# Patient Record
Sex: MALE | Race: WHITE | NOT HISPANIC OR LATINO | Employment: OTHER | URBAN - METROPOLITAN AREA
[De-identification: names, ages, dates, MRNs, and addresses within clinical notes are randomized per-mention and may not be internally consistent; named-entity substitution may affect disease eponyms.]

---

## 2017-02-14 ENCOUNTER — TRANSCRIBE ORDERS (OUTPATIENT)
Dept: ADMINISTRATIVE | Facility: HOSPITAL | Age: 47
End: 2017-02-14

## 2017-02-14 ENCOUNTER — HOSPITAL ENCOUNTER (OUTPATIENT)
Dept: RADIOLOGY | Facility: HOSPITAL | Age: 47
Discharge: HOME | End: 2017-02-14
Attending: INTERNAL MEDICINE
Payer: COMMERCIAL

## 2017-02-14 DIAGNOSIS — R60.9 SWELLING: ICD-10-CM

## 2017-02-14 DIAGNOSIS — R52 PAIN: Primary | ICD-10-CM

## 2017-02-14 DIAGNOSIS — R52 PAIN: ICD-10-CM

## 2017-02-14 PROCEDURE — 72050 X-RAY EXAM NECK SPINE 4/5VWS: CPT

## 2018-03-28 ENCOUNTER — HOSPITAL ENCOUNTER (EMERGENCY)
Facility: HOSPITAL | Age: 48
Discharge: HOME | End: 2018-03-28
Attending: EMERGENCY MEDICINE | Admitting: EMERGENCY MEDICINE
Payer: COMMERCIAL

## 2018-03-28 ENCOUNTER — APPOINTMENT (EMERGENCY)
Dept: RADIOLOGY | Facility: HOSPITAL | Age: 48
End: 2018-03-28
Payer: COMMERCIAL

## 2018-03-28 VITALS
TEMPERATURE: 97.7 F | BODY MASS INDEX: 39.99 KG/M2 | HEART RATE: 61 BPM | OXYGEN SATURATION: 97 % | RESPIRATION RATE: 18 BRPM | HEIGHT: 69 IN | DIASTOLIC BLOOD PRESSURE: 91 MMHG | SYSTOLIC BLOOD PRESSURE: 165 MMHG | WEIGHT: 270 LBS

## 2018-03-28 DIAGNOSIS — R00.2 PALPITATIONS: Primary | ICD-10-CM

## 2018-03-28 DIAGNOSIS — R55 NEAR SYNCOPE: ICD-10-CM

## 2018-03-28 LAB
ANION GAP SERPL CALCULATED.3IONS-SCNC: 10 MMOL/L (ref 4–13)
APTT PPP: 25 SECONDS (ref 23–35)
BASOPHILS # BLD AUTO: 0 THOUSANDS/ΜL (ref 0–0.1)
BASOPHILS NFR BLD AUTO: 0 % (ref 0–1)
BUN SERPL-MCNC: 16 MG/DL (ref 5–25)
CALCIUM SERPL-MCNC: 9.4 MG/DL (ref 8.3–10.1)
CHLORIDE SERPL-SCNC: 102 MMOL/L (ref 100–108)
CO2 SERPL-SCNC: 28 MMOL/L (ref 21–32)
CREAT SERPL-MCNC: 0.84 MG/DL (ref 0.6–1.3)
EOSINOPHIL # BLD AUTO: 0.1 THOUSAND/ΜL (ref 0–0.61)
EOSINOPHIL NFR BLD AUTO: 1 % (ref 0–6)
ERYTHROCYTE [DISTWIDTH] IN BLOOD BY AUTOMATED COUNT: 14.3 % (ref 11.6–15.1)
GFR SERPL CREATININE-BSD FRML MDRD: 103 ML/MIN/1.73SQ M
GLUCOSE SERPL-MCNC: 94 MG/DL (ref 65–140)
HCT VFR BLD AUTO: 41.9 % (ref 42–52)
HGB BLD-MCNC: 13.7 G/DL (ref 14–18)
INR PPP: 1.02 (ref 0.86–1.16)
LYMPHOCYTES # BLD AUTO: 1.9 THOUSANDS/ΜL (ref 0.6–4.47)
LYMPHOCYTES NFR BLD AUTO: 26 % (ref 14–44)
MCH RBC QN AUTO: 28.5 PG (ref 27–31)
MCHC RBC AUTO-ENTMCNC: 32.7 G/DL (ref 31.4–37.4)
MCV RBC AUTO: 87 FL (ref 82–98)
MONOCYTES # BLD AUTO: 0.5 THOUSAND/ΜL (ref 0.17–1.22)
MONOCYTES NFR BLD AUTO: 7 % (ref 4–12)
NEUTROPHILS # BLD AUTO: 4.8 THOUSANDS/ΜL (ref 1.85–7.62)
NEUTS SEG NFR BLD AUTO: 66 % (ref 43–75)
NRBC BLD AUTO-RTO: 0 /100 WBCS
NT-PROBNP SERPL-MCNC: 42 PG/ML
PLATELET # BLD AUTO: 251 THOUSANDS/UL (ref 130–400)
PMV BLD AUTO: 7.7 FL (ref 8.9–12.7)
POTASSIUM SERPL-SCNC: 4.3 MMOL/L (ref 3.5–5.3)
PROTHROMBIN TIME: 10.7 SECONDS (ref 9.4–11.7)
RBC # BLD AUTO: 4.8 MILLION/UL (ref 4.7–6.1)
SODIUM SERPL-SCNC: 140 MMOL/L (ref 136–145)
TROPONIN I SERPL-MCNC: <0.02 NG/ML
TROPONIN I SERPL-MCNC: <0.02 NG/ML
WBC # BLD AUTO: 7.3 THOUSAND/UL (ref 4.8–10.8)

## 2018-03-28 PROCEDURE — 85610 PROTHROMBIN TIME: CPT | Performed by: EMERGENCY MEDICINE

## 2018-03-28 PROCEDURE — 96361 HYDRATE IV INFUSION ADD-ON: CPT

## 2018-03-28 PROCEDURE — 85730 THROMBOPLASTIN TIME PARTIAL: CPT | Performed by: EMERGENCY MEDICINE

## 2018-03-28 PROCEDURE — 85025 COMPLETE CBC W/AUTO DIFF WBC: CPT | Performed by: EMERGENCY MEDICINE

## 2018-03-28 PROCEDURE — 96360 HYDRATION IV INFUSION INIT: CPT

## 2018-03-28 PROCEDURE — 99285 EMERGENCY DEPT VISIT HI MDM: CPT

## 2018-03-28 PROCEDURE — 36415 COLL VENOUS BLD VENIPUNCTURE: CPT | Performed by: EMERGENCY MEDICINE

## 2018-03-28 PROCEDURE — 80048 BASIC METABOLIC PNL TOTAL CA: CPT | Performed by: EMERGENCY MEDICINE

## 2018-03-28 PROCEDURE — 93005 ELECTROCARDIOGRAM TRACING: CPT

## 2018-03-28 PROCEDURE — 71045 X-RAY EXAM CHEST 1 VIEW: CPT

## 2018-03-28 PROCEDURE — 83880 ASSAY OF NATRIURETIC PEPTIDE: CPT | Performed by: EMERGENCY MEDICINE

## 2018-03-28 PROCEDURE — 84484 ASSAY OF TROPONIN QUANT: CPT | Performed by: EMERGENCY MEDICINE

## 2018-03-28 RX ORDER — ACETAMINOPHEN 325 MG/1
975 TABLET ORAL ONCE
Status: DISCONTINUED | OUTPATIENT
Start: 2018-03-28 | End: 2018-03-28 | Stop reason: HOSPADM

## 2018-03-28 RX ORDER — ESOMEPRAZOLE MAGNESIUM 40 MG/1
40 CAPSULE, DELAYED RELEASE ORAL
COMMUNITY

## 2018-03-28 RX ADMIN — SODIUM CHLORIDE 1000 ML: 0.9 INJECTION, SOLUTION INTRAVENOUS at 15:05

## 2018-03-28 NOTE — DISCHARGE INSTRUCTIONS
Heart Palpitations   WHAT YOU NEED TO KNOW:   Heart palpitations are feelings that your heart races, jumps, throbs, or flutters  You may feel extra beats, no beats for a short time, or skipped beats  You may have these feelings in your chest, throat, or neck  They may happen when you are sitting, standing, or lying  Heart palpitations may be frightening, but are usually not caused by a serious problem  DISCHARGE INSTRUCTIONS:   Call 911 or have someone else call for any of the following:   · You have any of the following signs of a heart attack:      ¨ Squeezing, pressure, or pain in your chest that lasts longer than 5 minutes or returns    ¨ Discomfort or pain in your back, neck, jaw, stomach, or arm     ¨ Trouble breathing    ¨ Nausea or vomiting    ¨ Lightheadedness or a sudden cold sweat, especially with chest pain or trouble breathing    · You have any of the following signs of a stroke:      ¨ Numbness or drooping on one side of your face     ¨ Weakness in an arm or leg    ¨ Confusion or difficulty speaking    ¨ Dizziness, a severe headache, or vision loss    · You faint or lose consciousness  Return to the emergency department if:   · Your palpitations happen more often or get more intense  Contact your healthcare provider if:   · You have new or worsening swelling in your feet or ankles  · You have questions or concerns about your condition or care  Follow up with your healthcare provider as directed: You may need to follow up with a cardiologist  Karli Pinto may need tests to check for heart problems that cause palpitations  Write down your questions so you remember to ask them during your visits  Keep a record:  Write down when your palpitations start and stop, what you were doing when they started, and your symptoms  Keep track of what you ate or drank within a few hours of your palpitations  Include anything that seemed to help your symptoms, such as lying down or holding your breath   This record will help you and your healthcare provider learn what triggers your palpitations  Bring this record with you to your follow up visits  Help prevent heart palpitations:   · Manage stress and anxiety  Find ways to relax such as listening to music, meditating, or doing yoga  Exercise can also help decrease stress and anxiety  Talk to someone you trust about your stress or anxiety  You can also talk to a therapist      · Get plenty of sleep every night  Ask your healthcare provider how much sleep you need each night  · Do not drink caffeine or alcohol  Caffeine and alcohol can make your palpitations worse  Caffeine is found in soda, coffee, tea, chocolate, and drinks that increase your energy  · Do not smoke  Nicotine and other chemicals in cigarettes and cigars may damage your heart and blood vessels  Ask your healthcare provider for information if you currently smoke and need help to quit  E-cigarettes or smokeless tobacco still contain nicotine  Talk to your healthcare provider before you use these products  · Do not use illegal drugs  Talk to your healthcare provider if you use illegal drugs and want help to quit  © 2017 [facility] Information is for End User's use only and may not be sold, redistributed or otherwise used for commercial purposes  All illustrations and images included in CareNotes® are the copyrighted property of A D [employer] M [employer]  or Wili Katie  The above information is an  only  It is not intended as medical advice for individual conditions or treatments  Talk to your doctor, nurse or pharmacist before following any medical regimen to see if it is safe and effective for you  Syncope   WHAT YOU NEED TO KNOW:   Syncope is also called fainting or passing out  Syncope is a sudden, temporary loss of consciousness, followed by a fall from a standing or sitting position   Syncope ranges from not serious to a sign of a more serious condition that needs to be treated  You can control some health conditions that cause syncope  Your healthcare providers can help you create a plan to manage syncope and prevent episodes  DISCHARGE INSTRUCTIONS:   Seek care immediately if:   · You are bleeding because you hit your head when you fainted  · You suddenly have double vision, difficulty speaking, numbness, and cannot move your arms or legs  · You have chest pain and trouble breathing  · You vomit blood or material that looks like coffee grounds  · You see blood in your bowel movement  Contact your healthcare provider if:   · You have new or worsening symptoms  · You have another syncope episode  · You have a headache, fast heartbeat, or feel too dizzy to stand up  · You have questions or concerns about your condition or care  Follow up with your healthcare provider as directed:  Write down your questions so you remember to ask them during your visits  Manage syncope:   · Keep a record of your syncope episodes  Include your symptoms and your activity before and after the episode  The record can help your healthcare provider find the cause of your syncope and help you manage episodes  · Sit or lie down when needed  This includes when you feel dizzy, your throat is getting tight, and your vision changes  Raise your legs above the level of your heart  · Take slow, deep breaths if you start to breathe faster with anxiety or fear  This can help decrease dizziness and the feeling that you might faint  · Check your blood pressure often  This is important if you take medicine to lower your blood pressure  Check your blood pressure when you are lying down and when you are standing  Ask how often to check during the day  Keep a record of your blood pressure numbers  Your healthcare provider may use the record to help plan your treatment         Prevent a syncope episode:   · Move slowly and let yourself get used to one position before you move to another position  This is very important when you change from a lying or sitting position to a standing position  Take some deep breaths before you stand up from a lying position  Stand up slowly  Sudden movements may cause a fainting spell  Sit on the side of the bed or couch for a few minutes before you stand up  If you are on bedrest, try to be upright for about 2 hours each day, or as directed  Do not lock your legs if you are standing for a long period of time  Move your legs and bend your knees to keep blood flowing  · Follow your healthcare provider's recommendations  Your provider may  recommend that you drink more liquids to prevent dehydration  You may also need to have more salt to keep your blood pressure from dropping too low and causing syncope  Your provider will tell you how much liquid and sodium to have each day  · Watch for signs of low blood sugar  These include hunger, nervousness, sweating, and fast or fluttery heartbeats  Talk with your healthcare provider about ways to keep your blood sugar level steady  · Do not strain if you are constipated  You may faint if you strain to have a bowel movement  Walking is the best way to get your bowels moving  Eat foods high in fiber to make it easier to have a bowel movement  Good examples are high-fiber cereals, beans, vegetables, and whole-grain breads  Prune juice may help make bowel movements softer  · Be careful in hot weather  Heat can cause a syncope episode  Limit activity done outside on hot days  Physical activity in hot weather can lead to dehydration  This can cause an episode  © 2017 [address] [facility] Information is for End User's use only and may not be sold, redistributed or otherwise used for commercial purposes  All illustrations and images included in CareNotes® are the copyrighted property of [employer]  or [address]  The above information is an  only   It is not intended as medical advice for individual conditions or treatments  Talk to your doctor, nurse or pharmacist before following any medical regimen to see if it is safe and effective for you

## 2018-03-28 NOTE — ED PROVIDER NOTES
History  Chief Complaint   Patient presents with    Palpitations     Patient states that he was driving and didn't well and light headed  States that he had to call his mom to come get him  History provided by:  Patient  Palpitations   Palpitations quality:  Unable to specify  Onset quality:  Unable to specify  Timing:  Unable to specify  Progression:  Worsening  Chronicity:  New  Relieved by:  Deep relaxation and breathing exercises  Worsened by:  Nothing  Ineffective treatments:  None tried  Associated symptoms: near-syncope    Associated symptoms: no chest pain, no cough, no dizziness, no nausea, no numbness, no shortness of breath and no weakness    Risk factors: stress        Prior to Admission Medications   Prescriptions Last Dose Informant Patient Reported? Taking?   esomeprazole (NexIUM) 40 MG capsule   Yes Yes   Sig: Take 40 mg by mouth every morning before breakfast      Facility-Administered Medications: None       Past Medical History:   Diagnosis Date    GERD (gastroesophageal reflux disease)        History reviewed  No pertinent surgical history  History reviewed  No pertinent family history  I have reviewed and agree with the history as documented  Social History   Substance Use Topics    Smoking status: Never Smoker    Smokeless tobacco: Never Used    Alcohol use No        Review of Systems   Constitutional: Negative for chills and fever  HENT: Negative for rhinorrhea, sore throat and trouble swallowing  Eyes: Negative for pain  Respiratory: Negative for cough, shortness of breath, wheezing and stridor  Cardiovascular: Positive for near-syncope  Negative for chest pain and leg swelling  Gastrointestinal: Negative for abdominal pain, diarrhea and nausea  Endocrine: Negative for polyuria  Genitourinary: Negative for dysuria, flank pain and urgency  Musculoskeletal: Negative for joint swelling, myalgias and neck stiffness  Skin: Negative for rash  Allergic/Immunologic: Negative for immunocompromised state  Neurological: Negative for dizziness, syncope, weakness, numbness and headaches  Psychiatric/Behavioral: Negative for confusion and suicidal ideas  All other systems reviewed and are negative  Physical Exam  ED Triage Vitals   Temperature Pulse Respirations Blood Pressure SpO2   03/28/18 1429 03/28/18 1429 03/28/18 1429 03/28/18 1429 03/28/18 1429   97 7 °F (36 5 °C) 88 22 (!) 178/96 99 %      Temp src Heart Rate Source Patient Position - Orthostatic VS BP Location FiO2 (%)   -- 03/28/18 1510 03/28/18 1510 03/28/18 1510 --    Monitor Sitting Right arm       Pain Score       03/28/18 1429       6           Orthostatic Vital Signs  Vitals:    03/28/18 1429 03/28/18 1510   BP: (!) 178/96 156/84   Pulse: 88 65   Patient Position - Orthostatic VS:  Sitting       Physical Exam   Constitutional: He is oriented to person, place, and time  He appears well-developed and well-nourished  HENT:   Head: Normocephalic and atraumatic  Eyes: EOM are normal  Pupils are equal, round, and reactive to light  Neck: Normal range of motion  Neck supple  Cardiovascular: Normal rate and regular rhythm  Exam reveals no friction rub  No murmur heard  Pulmonary/Chest: Breath sounds normal  No respiratory distress  He has no wheezes  He has no rales  Abdominal: Soft  Bowel sounds are normal  He exhibits no distension  There is no tenderness  Musculoskeletal: Normal range of motion  He exhibits no edema or tenderness  Neurological: He is alert and oriented to person, place, and time  GCS 15  AAOx4  No focal neuro deficits  CN II-XII intact  PERRL  EOMI  Murdock Organ No pronator drift   strength 5/5 bilaterally  B/L UE strength 5/5 throughout  Finger to nose normal  Cerebellar function normal  Ambulates without difficulty  B/L LE strength 5/5 throughout  Gross sensation to b/l upper and lower extremities intact  Skin: Skin is warm  No rash noted  Psychiatric: He has a normal mood and affect  Nursing note and vitals reviewed  ED Medications  Medications   acetaminophen (TYLENOL) tablet 975 mg (975 mg Oral Not Given 3/28/18 4247)   sodium chloride 0 9 % bolus 1,000 mL (1,000 mL Intravenous New Bag 3/28/18 1505)       Diagnostic Studies  Results Reviewed     Procedure Component Value Units Date/Time    Troponin I [[id number]]     Lab Status:  No result Specimen:  Blood     Basic metabolic panel [[id number]] Collected:  03/28/18 1505    Lab Status:  Final result Specimen:  Blood from Arm, Left Updated:  03/28/18 1538     Sodium 140 mmol/L      Potassium 4 3 mmol/L      Chloride 102 mmol/L      CO2 28 mmol/L      Anion Gap 10 mmol/L      BUN 16 mg/dL      Creatinine 0 84 mg/dL      Glucose 94 mg/dL      Calcium 9 4 mg/dL      eGFR 103 ml/min/1 73sq m     Narrative:         National Kidney Disease Education Program recommendations are as follows:  GFR calculation is accurate only with a steady state creatinine  Chronic Kidney disease less than 60 ml/min/1 73 sq  meters  Kidney failure less than 15 ml/min/1 73 sq  meters  B-type natriuretic peptide [[id number]]  (Normal) Collected:  03/28/18 1505    Lab Status:  Final result Specimen:  Blood from Arm, Left Updated:  03/28/18 1538     NT-proBNP 42 pg/mL     Troponin I [[id number]]  (Normal) Collected:  03/28/18 1505    Lab Status:  Final result Specimen:  Blood from Arm, Left Updated:  03/28/18 1534     Troponin I <0 02 ng/mL     Narrative:         [employer] Chemistry analyzer 99% cutoff is > 0 04 ng/mL in network labs    o cTnI 99% cutoff is useful only when applied to patients in the clinical setting of myocardial ischemia  o cTnI 99% cutoff should be interpreted in the context of clinical history, ECG findings and possibly cardiac imaging to establish correct diagnosis  o cTnI 99% cutoff may be suggestive but clearly not indicative of a coronary event without the clinical setting of myocardial ischemia  David Mack [[id number]]  (Normal) Collected:  03/28/18 1505    Lab Status:  Final result Specimen:  Blood from Arm, Left Updated:  03/28/18 1532     Protime 10 7 seconds      INR 1 02    APTT [[id number]]  (Normal) Collected:  03/28/18 1505    Lab Status:  Final result Specimen:  Blood from Arm, Left Updated:  03/28/18 1532     PTT 25 seconds     Narrative: Therapeutic Heparin Range = 60-90 seconds    CBC and differential [[id number]]  (Abnormal) Collected:  03/28/18 1505    Lab Status:  Final result Specimen:  Blood from Arm, Left Updated:  03/28/18 1516     WBC 7 30 Thousand/uL      RBC 4 80 Million/uL      Hemoglobin 13 7 (L) g/dL      Hematocrit 41 9 (L) %      MCV 87 fL      MCH 28 5 pg      MCHC 32 7 g/dL      RDW 14 3 %      MPV 7 7 (L) fL      Platelets 943 Thousands/uL      nRBC 0 /100 WBCs      Neutrophils Relative 66 %      Lymphocytes Relative 26 %      Monocytes Relative 7 %      Eosinophils Relative 1 %      Basophils Relative 0 %      Neutrophils Absolute 4 80 Thousands/µL      Lymphocytes Absolute 1 90 Thousands/µL      Monocytes Absolute 0 50 Thousand/µL      Eosinophils Absolute 0 10 Thousand/µL      Basophils Absolute 0 00 Thousands/µL                  XR chest 1 view portable   Final Result by Serena Ross MD (03/28 1515)      No acute cardiopulmonary disease              Workstation performed: [id number]                    Procedures  ECG 12 Lead Documentation  Date/Time: 3/28/2018 2:36 PM  Performed by: Deja Crowley by: Dolores Partida     ECG reviewed by me, the ED Provider: yes    Patient location:  ED  Previous ECG:     Previous ECG:  Compared to current    Similarity:  No change  Interpretation:     Interpretation: normal    Rate:     ECG rate assessment: normal    Rhythm:     Rhythm: sinus rhythm    Ectopy:     Ectopy: none    QRS:     QRS axis:  Normal    QRS intervals:  Normal  Conduction:     Conduction: normal    ST segments:     ST segments:  Normal  T waves: T waves: normal             Phone Contacts  ED Phone Contact    ED Course  ED Course                                MDM  Number of Diagnoses or Management Options  Near syncope: new and requires workup  Palpitations: new and requires workup  Diagnosis management comments: This is a 49-year-old male who presents emergency department near syncopal symptoms that started earlier today while he was driving his  Back from work  The patient felt like he had to pull over he felt like he was going to pass out  He had no chest pain he had some mild shortness of breath associated with it  He was able to come is clots and be common collected  Symptoms happened once again light was driving to the hospital but then has since stopped  Here in the emergency department is blood work unremarkable EKG is normal and troponin is normal as well  We will repeat a troponin here in the emergency department for further evaluation  Plan outpatient management followup onset troponin is back  Pending this at this point time  Amount and/or Complexity of Data Reviewed  Clinical lab tests: reviewed  Tests in the radiology section of CPT®: ordered and reviewed  Review and summarize past medical records: yes      CritCare Time    Disposition  Final diagnoses:   Palpitations   Near syncope     Time reflects when diagnosis was documented in both MDM as applicable and the Disposition within this note     Time User Action Codes Description Comment    3/28/2018  4:11 PM Jake Betts Add [R00 2] Palpitations     3/28/2018  4:11 PM Jake Betts Add [R55] Near syncope       ED Disposition     ED Disposition Condition Comment    Discharge  Charles Giordano discharge to home/self care  Condition at discharge: Stable        Follow-up Information    None       Patient's Medications   Discharge Prescriptions    No medications on file     No discharge procedures on file      ED Provider  Electronically Signed by           Barbara Tarango DO  03/28/18 1629

## 2018-03-30 LAB
ATRIAL RATE: 64 BPM
P AXIS: 23 DEGREES
PR INTERVAL: 160 MS
QRS AXIS: 28 DEGREES
QRSD INTERVAL: 80 MS
QT INTERVAL: 420 MS
QTC INTERVAL: 433 MS
T WAVE AXIS: 24 DEGREES
VENTRICULAR RATE: 64 BPM

## 2018-03-30 PROCEDURE — 93010 ELECTROCARDIOGRAM REPORT: CPT | Performed by: INTERNAL MEDICINE

## 2019-01-02 ENCOUNTER — TELEPHONE (OUTPATIENT)
Dept: OBGYN CLINIC | Facility: HOSPITAL | Age: 49
End: 2019-01-02

## 2019-01-02 NOTE — TELEPHONE ENCOUNTER
Called patient because we will need his signature in order for our provider to fax over an request for his medical records   Other than that we will be more than happy to assist

## 2019-01-02 NOTE — TELEPHONE ENCOUNTER
Patient called back  I did relay the message that he needs to sign a records release  He states he will stop over tomorrow to sign this

## 2019-01-16 ENCOUNTER — APPOINTMENT (OUTPATIENT)
Dept: RADIOLOGY | Facility: CLINIC | Age: 49
End: 2019-01-16
Payer: COMMERCIAL

## 2019-01-16 ENCOUNTER — OFFICE VISIT (OUTPATIENT)
Dept: RHEUMATOLOGY | Facility: CLINIC | Age: 49
End: 2019-01-16
Payer: COMMERCIAL

## 2019-01-16 VITALS
BODY MASS INDEX: 44.23 KG/M2 | WEIGHT: 298.6 LBS | DIASTOLIC BLOOD PRESSURE: 83 MMHG | SYSTOLIC BLOOD PRESSURE: 139 MMHG | RESPIRATION RATE: 97 BRPM | HEART RATE: 83 BPM | HEIGHT: 69 IN

## 2019-01-16 DIAGNOSIS — M16.6 OTHER SECONDARY OSTEOARTHRITIS OF BOTH HIPS: ICD-10-CM

## 2019-01-16 DIAGNOSIS — L40.9 PSORIASIS: ICD-10-CM

## 2019-01-16 DIAGNOSIS — L40.50 PSORIATIC ARTHRITIS (HCC): Primary | ICD-10-CM

## 2019-01-16 DIAGNOSIS — Z79.899 HIGH RISK MEDICATION USE: ICD-10-CM

## 2019-01-16 DIAGNOSIS — L40.50 PSORIATIC ARTHRITIS (HCC): ICD-10-CM

## 2019-01-16 DIAGNOSIS — E66.01 CLASS 3 SEVERE OBESITY WITHOUT SERIOUS COMORBIDITY WITH BODY MASS INDEX (BMI) OF 40.0 TO 44.9 IN ADULT, UNSPECIFIED OBESITY TYPE (HCC): ICD-10-CM

## 2019-01-16 PROCEDURE — 73130 X-RAY EXAM OF HAND: CPT

## 2019-01-16 PROCEDURE — 99204 OFFICE O/P NEW MOD 45 MIN: CPT | Performed by: INTERNAL MEDICINE

## 2019-01-16 NOTE — PROGRESS NOTES
Assessment and Plan:   Mr Wendy Chacon is a 49-year-old male with history significant for psoriatic arthritis and obesity, who presents for further management of psoriatic arthritis  He is transitioning care from Dr Amber Shaffer to [facility]  He is currently on subcutaneous Enbrel 50 mg weekly  June Draper presents today to establish with [facility] for further management of psoriatic arthritis  He is currently well maintained on subcutaneous Enbrel 50 mg weekly  He denies any significant inflammatory arthritis symptoms, but he does appear to have evidence of mild dactylitis affecting his left hand thumb, index and middle fingers  He states as overall he has been tolerating the Enbrel well, he is not interested in changing his medication at this time, and I advised him we can continue to monitor his symptoms as he is not experiencing significant pain at those joints  I did advise him that if he is to continue having recurrent flare-ups this may be an indication for trying an alternate medication such as an IL 17 inhibitor  I did also offer him short courses of prednisone to use as needed during a flare up, but he would like to refrain from steroids for now, and states he can manage his symptoms with NSAIDs if needed  - I will obtain the labs as listed below, as well as obtain baseline x-rays of his hands to evaluate for any erosive changes  - I will see him back in the office in 3 months, but advised him to call with any concerns  Plan:  Diagnoses and all orders for this visit:    Psoriatic arthritis ([facility] [address] )  -     Chronic Hepatitis Panel; Future  -     Comprehensive metabolic panel; Future  -     CBC and differential; Future  -     C-reactive protein; Future  -     Sedimentation rate, automated; Future  -     RF Screen w/ Reflex to Titer; Future  -     Cyclic citrul peptide antibody, IgG; Future  -     Uric acid; Future  -     Quantiferon TB Gold Plus;  Future  -     XR hand 3+ vw right; Future  -     XR hand 3+ vw left; Future  -     etanercept (ENBREL) 50 mg/mL SOSY; Inject 1 mL (50 mg total) under the skin once a week  -     Lipid Panel with Direct LDL reflex; Future    High risk medication use    Class 3 severe obesity without serious comorbidity with body mass index (BMI) of 40 0 to 44 9 in adult, unspecified obesity type ([address] )      Activities as tolerated    Diet: low carb/low fat, more greens/vegetables, adequate hydration  Exercise: try to maintain a low impact exercise regimen as much as possible  Walk for 30 minutes a day for at least 3 days a week    Encouraged to maintain good sleep hygiene  Continue other medications as prescribed by PCP and other specialists        RTC in 3 months  HPI  Mr Lizzie Shetty is a 69-year-old male with history significant for psoriatic arthritis and obesity, who presents for further management of psoriatic arthritis  He is transitioning care from Dr Duane Barnard to  [facility]  He is currently on subcutaneous Enbrel 50 mg weekly  Patient states he was diagnosed with psoriatic arthritis in the mid 1990s, when he presented with severe pain and swelling of his feet  He states the symptoms progressed rapidly over time to the point where he was using crutches for ambulating  He was seen by multiple specialists including Podiatry and Orthopedics, and treated for plantar fasciitis without any relief  He also underwent an EMG/NCS study of his lower extremities which was unrevealing  Eventually he was referred to a rheumatologist and immediately diagnosed with psoriatic arthritis  He states initially he was started on an oral DMARD but cannot recall the name  He states it made him very sun sensitive so it was discontinued  Following this he was started on subcutaneous Enbrel 50 mg weekly which he has been maintained on since then    He has been tolerating the Enbrel well without any noticeable side effects and there have been no concerns for recurrent infections  He states briefly he was switched from Enbrel to Humira for a short period of time, but for various reasons he disliked the Humira and was eventually transitioned back to Enbrel  He states the change was made due to concerns for continued inflammation at a few finger joints  Currently he denies any joint pains, and states he does not experience any frequent flare-ups  On occasion the only joints that do bother him is his left hand thumb, index and middle fingers  He states the pain resolves spontaneously  Recently he did have an episode of right knee swelling after prolonged activities and was seen by his orthopedic doctor who thought it was related to the activities he was doing  He was administered an intra-articular cortisone injection which helped him  Since then he has not experienced any joint swelling  He does experience morning stiffness which affects him diffusely but more commonly affects his back, which he states lasts a few minutes  There has been no requirement for him to take any over-the-counter pain medications  He has also not had any significant flare ups in the psoriasis  He denies any other complaints at today's visit  The following portions of the patient's history were reviewed and updated as appropriate: allergies, current medications, past family history, past medical history, past social history, past surgical history and problem list       Review of Systems  Constitutional: Negative for weight change, fevers, chills, night sweats, fatigue  ENT/Mouth: Negative for hearing changes, ear pain, nasal congestion, sinus pain, hoarseness, sore throat, rhinorrhea, swallowing difficulty  Eyes: Negative for pain, redness, discharge, vision changes  Cardiovascular: Negative for chest pain, SOB, palpitations  Respiratory: Negative for cough, sputum, wheezing, dyspnea     Gastrointestinal: Negative for nausea, vomiting, diarrhea, constipation, pain, heartburn  Genitourinary: Negative for dysuria, urinary frequency, hematuria  Musculoskeletal: As per HPI  Skin: Negative for skin rash, color changes  Neuro: Negative for weakness, numbness, tingling, loss of consciousness  Psych: Negative for anxiety, depression  Heme/Lymph: Negative for easy bruising, bleeding, lymphadenopathy  Past Medical History:   Diagnosis Date    GERD (gastroesophageal reflux disease)     Psoriatic arthritis ([facility] [address] )        Past Surgical History:   Procedure Laterality Date    HIP SURGERY         Social History     Social History    Marital status: /Civil Union     Spouse name: N/A    Number of children: N/A    Years of education: N/A     Occupational History    Not on file       Social History Main Topics    Smoking status: Never Smoker    Smokeless tobacco: Never Used    Alcohol use No    Drug use: No    Sexual activity: Not on file     Other Topics Concern    Not on file     Social History Narrative    No narrative on file       Family History   Problem Relation Age of Onset    No Known Problems Mother     No Known Problems Father     No Known Problems Sister     No Known Problems Brother     No Known Problems Daughter     No Known Problems Maternal Grandmother     No Known Problems Maternal Grandfather     No Known Problems Paternal Grandmother     No Known Problems Paternal Grandfather     No Known Problems Maternal Aunt     No Known Problems Maternal Uncle     No Known Problems Paternal Aunt     No Known Problems Paternal Uncle     No Known Problems Cousin     Alcohol abuse Neg Hx     Arthritis Neg Hx     Dementia Neg Hx     Cancer Neg Hx     COPD Neg Hx     Depression Neg Hx     Lupus Neg Hx     Drug abuse Neg Hx     Early death Neg Hx     Hearing loss Neg Hx     Hyperlipidemia Neg Hx     Learning disabilities Neg Hx     Substance Abuse Neg Hx     Stroke Neg Hx     Vision loss Neg Hx     Pulmonary embolism Neg Hx  Deep vein thrombosis Neg Hx     Mental illness Neg Hx     Crohn's disease Neg Hx     Rheum arthritis Neg Hx     Psoriasis Neg Hx     Asthma Neg Hx        No Known Allergies      Current Outpatient Prescriptions:     esomeprazole (NexIUM) 40 MG capsule, Take 40 mg by mouth every morning before breakfast, Disp: , Rfl:     etanercept (ENBREL) 50 mg/mL SOSY, Inject 1 mL (50 mg total) under the skin once a week, Disp: 4 Syringe, Rfl: 5      Objective:    Vitals:    01/16/19 1204   BP: 139/83   BP Location: Left arm   Patient Position: Sitting   Cuff Size: Large   Pulse: 83   Resp: (!) 97   Weight: 135 kg (298 lb 9 6 oz)   Height: 5' 9" (1 753 m)       Physical Exam  General: Well appearing, well nourished, in no distress  Oriented x 3, normal mood and affect  Ambulating without difficulty  Obese  Skin: Good turgor, no unusual bruising or prominent lesions  Small blisters noted on his right lower extremity, as well as hyperpigmentation possibly related to healed psoriasis  Hair: Normal texture and distribution  Nails: Normal color, no deformities  Fungal changes noted on his toenails  HEENT:  Head: Normocephalic, atraumatic  Eyes: Conjunctiva clear, sclera non-icteric, EOM intact  Nose: No external lesions, mucosa non-inflamed  Mouth: Mucous membranes moist, no mucosal lesions  Neck: Supple, thyroid non-enlarged and non-tender  No lymphadenopathy  Heart: Regular rate and rhythm, no murmur or gallop  Lungs: Clear to auscultation, no crackles or wheezing  Extremities: No amputations or deformities, cyanosis, edema  Musculoskeletal:   Hands - he has a slightly puffy appearance to his hands bilaterally, but I do not appreciate any soft tissue swelling or tenderness at his right hand DIPs, PIPs or MCPs  His left hand demonstrates a slight dactylitic appearance of his 1st, 2nd and 3rd digits  There is no tenderness or erythema noted  He is able to make full fists bilaterally    MCPs are unremarkable  Wrists, elbows and shoulders - unremarkable  Hips - status post bilateral total hip replacement  Knees - unremarkable  Ankles - soft tissue swelling present at the lateral malleolus bilaterally  There is no tenderness, warmth, erythema noted and he has good range of motion at his subtalar joint bilaterally  Feet - negative MTP squeeze test bilaterally  There is no dactylitis  No enthesitis  No sacroiliac joint tenderness  Neurologic: Alert and oriented  No focal neurological deficits appreciated  Psychiatric: Normal mood and affect  SOBEIDA Andrade    Rheumatology

## 2019-02-15 ENCOUNTER — TELEPHONE (OUTPATIENT)
Dept: OBGYN CLINIC | Facility: HOSPITAL | Age: 49
End: 2019-02-15

## 2019-02-15 NOTE — TELEPHONE ENCOUNTER
Unable to find labs  Am I not looking in the right place? [place] where were the labs you saw? Mac Garay, he had his labs drawn at the lab on [facility] their number is [phone]  Thanks

## 2019-02-15 NOTE — TELEPHONE ENCOUNTER
Patient requested a copy to be mailed which I will do  He would also like a call to discuss       Please call [phone]

## 2019-02-15 NOTE — TELEPHONE ENCOUNTER
Patient is requesting a copy of his lab santos results that are in the system  He would like to stop by the [place] office to get a copy or mailed out to him  C/b# [phone]

## 2019-02-18 ENCOUNTER — TELEPHONE (OUTPATIENT)
Dept: RHEUMATOLOGY | Facility: CLINIC | Age: 49
End: 2019-02-18

## 2019-02-18 NOTE — TELEPHONE ENCOUNTER
Called lab again and was able to retrieve lab results  Will contact patient once results are released

## 2019-02-18 NOTE — TELEPHONE ENCOUNTER
Called Patient and spoke to him about his lab results as advised  Patient was with understanding and only request for a copy be sent to him via mail

## 2019-02-18 NOTE — TELEPHONE ENCOUNTER
Please let patient know his labs were normal, except for mild elevation in his cholesterol  The LDL was 140  He can discuss this further with his PCP  Thanks

## 2019-04-16 ENCOUNTER — OFFICE VISIT (OUTPATIENT)
Dept: RHEUMATOLOGY | Facility: CLINIC | Age: 49
End: 2019-04-16
Payer: COMMERCIAL

## 2019-04-16 VITALS
WEIGHT: 298 LBS | HEART RATE: 80 BPM | DIASTOLIC BLOOD PRESSURE: 83 MMHG | SYSTOLIC BLOOD PRESSURE: 132 MMHG | HEIGHT: 69 IN | BODY MASS INDEX: 44.14 KG/M2

## 2019-04-16 DIAGNOSIS — L40.50 PSORIATIC ARTHROPATHY (HCC): Primary | ICD-10-CM

## 2019-04-16 DIAGNOSIS — M77.11 RIGHT LATERAL EPICONDYLITIS: ICD-10-CM

## 2019-04-16 DIAGNOSIS — E66.01 CLASS 3 SEVERE OBESITY WITHOUT SERIOUS COMORBIDITY WITH BODY MASS INDEX (BMI) OF 40.0 TO 44.9 IN ADULT, UNSPECIFIED OBESITY TYPE (HCC): ICD-10-CM

## 2019-04-16 DIAGNOSIS — Z79.899 LONG-TERM USE OF IMMUNOSUPPRESSANT MEDICATION: ICD-10-CM

## 2019-04-16 PROCEDURE — 99213 OFFICE O/P EST LOW 20 MIN: CPT | Performed by: INTERNAL MEDICINE

## 2019-04-16 RX ORDER — LISINOPRIL 5 MG/1
TABLET ORAL
Refills: 0 | COMMUNITY
Start: 2019-04-04

## 2019-04-16 RX ORDER — MELOXICAM 15 MG/1
15 TABLET ORAL DAILY PRN
Qty: 30 TABLET | Refills: 0 | Status: SHIPPED | OUTPATIENT
Start: 2019-04-16

## 2019-08-07 DIAGNOSIS — L40.50 PSORIATIC ARTHRITIS (HCC): ICD-10-CM

## 2019-08-07 RX ORDER — ETANERCEPT 50 MG/ML
SOLUTION SUBCUTANEOUS
Qty: 4 SYRINGE | Refills: 5 | Status: SHIPPED | OUTPATIENT
Start: 2019-08-07 | End: 2020-01-27 | Stop reason: SDUPTHER

## 2019-08-07 NOTE — TELEPHONE ENCOUNTER
There is no note in chart about an auth as it looks like patient came to practice on medication   Pharmacy will notify me if new Jenni Pickup is needed

## 2019-09-23 ENCOUNTER — OFFICE VISIT (OUTPATIENT)
Dept: URGENT CARE | Facility: CLINIC | Age: 49
End: 2019-09-23
Payer: COMMERCIAL

## 2019-09-23 VITALS
TEMPERATURE: 97 F | HEART RATE: 59 BPM | BODY MASS INDEX: 44.14 KG/M2 | OXYGEN SATURATION: 99 % | HEIGHT: 69 IN | WEIGHT: 298 LBS | DIASTOLIC BLOOD PRESSURE: 84 MMHG | SYSTOLIC BLOOD PRESSURE: 147 MMHG | RESPIRATION RATE: 18 BRPM

## 2019-09-23 DIAGNOSIS — S60.511A ABRASION OF MULTIPLE SITES OF RIGHT HAND AND FINGER, INITIAL ENCOUNTER: Primary | ICD-10-CM

## 2019-09-23 DIAGNOSIS — S60.419A ABRASION OF MULTIPLE SITES OF RIGHT HAND AND FINGER, INITIAL ENCOUNTER: Primary | ICD-10-CM

## 2019-09-23 PROCEDURE — 90715 TDAP VACCINE 7 YRS/> IM: CPT | Performed by: OBSTETRICS & GYNECOLOGY

## 2019-09-23 PROCEDURE — 90471 IMMUNIZATION ADMIN: CPT | Performed by: OBSTETRICS & GYNECOLOGY

## 2019-09-23 PROCEDURE — 99213 OFFICE O/P EST LOW 20 MIN: CPT | Performed by: NURSE PRACTITIONER

## 2019-09-23 NOTE — PROGRESS NOTES
[id number] [email or web address] [facility]        NAME: Etta Lemus is a 46 y o  male  : 1970    MRN: [id number]  DATE: 2019  TIME: 3:22 PM    Assessment and Plan   Abrasion of multiple sites of right hand and finger, initial encounter [S60 511A, S60 419A]  1  Abrasion of multiple sites of right hand and finger, initial encounter  TDAP Vaccine greater than or equal to 6yo         Patient Instructions     Keep fingers clean and dry  Wash with warm soapy water  If redness, drainage, or foul odors occur follow up  Can apply thin layer of bacitracin as needed  May cover while at work, leave open to air while at home  Follow up with PCP in 3-5 days  Proceed to  ER if symptoms worsen  Chief Complaint     Chief Complaint   Patient presents with    Abrasion     yesterday was [place] river and a kimberly fish hook grazed his fingers  superficial abrasion noted on his right index, middle, and ring fingers  pt cleaned area well  needs tetanus shot          History of Present Illness       45 y/o male presents for abrasions to his right 2nd, 3rd, and 4th fingers that were sustained by a dirty rope and fishing hook yesterday  He states that yesterday he was [place] dock from the river  As he was pulling the rope up from the river his hand was raked with a kimberly metal hook  He washed the fingers with peroxide  He currently has no pain or redness  His tetanus is not up to date  Review of Systems   Review of Systems   Constitutional: Negative for chills and fever  Respiratory: Negative for cough  Cardiovascular: Negative for chest pain and palpitations  Gastrointestinal: Negative for nausea and vomiting  Skin: Positive for wound           Current Medications       Current Outpatient Medications:     ENBREL 50 MG/ML SOSY, Inject 1 ml (50 mg) under the skin once a week, Disp: 4 Syringe, Rfl: 5    esomeprazole (NexIUM) 40 MG capsule, Take 40 mg by mouth every morning before breakfast, Disp: , Rfl:     lisinopril (ZESTRIL) 5 mg tablet, , Disp: , Rfl: 0    meloxicam (MOBIC) 15 mg tablet, Take 1 tablet (15 mg total) by mouth daily as needed for moderate pain, Disp: 30 tablet, Rfl: 0    Current Allergies     Allergies as of 09/23/2019    (No Known Allergies)            The following portions of the patient's history were reviewed and updated as appropriate: allergies, current medications, past family history, past medical history, past social history, past surgical history and problem list      Past Medical History:   Diagnosis Date    GERD (gastroesophageal reflux disease)     Psoriatic arthritis ([facility] [address] )        Past Surgical History:   Procedure Laterality Date    HIP SURGERY         Family History   Problem Relation Age of Onset    No Known Problems Mother     No Known Problems Father     No Known Problems Sister     No Known Problems Brother     No Known Problems Daughter     No Known Problems Maternal Grandmother     No Known Problems Maternal Grandfather     No Known Problems Paternal Grandmother     No Known Problems Paternal Grandfather     No Known Problems Maternal Aunt     No Known Problems Maternal Uncle     No Known Problems Paternal Aunt     No Known Problems Paternal Uncle     No Known Problems Cousin     Alcohol abuse Neg Hx     Arthritis Neg Hx     Dementia Neg Hx     Cancer Neg Hx     COPD Neg Hx     Depression Neg Hx     Lupus Neg Hx     Drug abuse Neg Hx     Early death Neg Hx     Hearing loss Neg Hx     Hyperlipidemia Neg Hx     Learning disabilities Neg Hx     Substance Abuse Neg Hx     Stroke Neg Hx     Vision loss Neg Hx     Pulmonary embolism Neg Hx     Deep vein thrombosis Neg Hx     Mental illness Neg Hx     Crohn's disease Neg Hx     Rheum arthritis Neg Hx     Psoriasis Neg Hx     Asthma Neg Hx          Medications have been verified          Objective   /84   Pulse 59   Temp (!) 97 °F (36 1 °C)   Resp 18   Ht 5' 9" (1 753 m) Wt 135 kg (298 lb)   SpO2 99%   BMI 44 01 kg/m²        Physical Exam     Physical Exam   Constitutional: He is oriented to person, place, and time  Vital signs are normal  He appears well-developed and well-nourished  No distress  Cardiovascular: Normal rate, regular rhythm and normal heart sounds  Pulmonary/Chest: Effort normal and breath sounds normal  No respiratory distress  Neurological: He is alert and oriented to person, place, and time  He has normal strength  GCS eye subscore is 4  GCS verbal subscore is 5  GCS motor subscore is 6  Skin: Skin is warm and dry  Abrasion noted  To the 2nd, 3rd, and 4th finger of the right hand ventral aspect between the PIP and DIP joint there are 3 small abrasions  Skin is erythematic  There is no drainage  Psychiatric: He has a normal mood and affect  His speech is normal    Nursing note and vitals reviewed

## 2019-09-23 NOTE — PATIENT INSTRUCTIONS
Keep fingers clean and dry  Wash with warm soapy water  If redness, drainage, or foul odors occur follow up  Can apply thin layer of bacitracin as needed  May cover while at work, leave open to air while at home

## 2019-10-03 ENCOUNTER — OFFICE VISIT (OUTPATIENT)
Dept: RHEUMATOLOGY | Facility: CLINIC | Age: 49
End: 2019-10-03
Payer: COMMERCIAL

## 2019-10-03 VITALS
HEIGHT: 69 IN | BODY MASS INDEX: 41.06 KG/M2 | DIASTOLIC BLOOD PRESSURE: 77 MMHG | HEART RATE: 58 BPM | WEIGHT: 277.2 LBS | SYSTOLIC BLOOD PRESSURE: 122 MMHG

## 2019-10-03 DIAGNOSIS — E66.01 CLASS 3 SEVERE OBESITY WITHOUT SERIOUS COMORBIDITY WITH BODY MASS INDEX (BMI) OF 40.0 TO 44.9 IN ADULT, UNSPECIFIED OBESITY TYPE (HCC): ICD-10-CM

## 2019-10-03 DIAGNOSIS — Z79.899 LONG-TERM USE OF IMMUNOSUPPRESSANT MEDICATION: ICD-10-CM

## 2019-10-03 DIAGNOSIS — L40.50 PSORIATIC ARTHROPATHY (HCC): Primary | ICD-10-CM

## 2019-10-03 PROCEDURE — 99213 OFFICE O/P EST LOW 20 MIN: CPT | Performed by: INTERNAL MEDICINE

## 2019-10-03 NOTE — PROGRESS NOTES
Assessment and Plan:   Mr Villela is a 52-year-old male with history significant for psoriatic arthritis and obesity, who presents for follow up of psoriatic arthritis   He is currently on subcutaneous Enbrel 50 mg weekly      # Psoriatic arthritis  - Saintclair Chill presents today for follow-up of psoriatic arthritis which has been well controlled on subcutaneous Enbrel 50 mg weekly and meloxicam 15 milligram daily as needed  He has not had a flare-up of his arthritic complaints, and is doing well at this time  In view of this I recommend he continue the subcutaneous Enbrel 50 milligram weekly  - We will repeat a CBC and CMP prior to his next office visit with me in 4 months  - I advised him to call in the interim with any concerns or flare-up in symptoms  Plan:  Diagnoses and all orders for this visit:    Psoriatic arthropathy ([address] )    Long-term use of immunosuppressant medication    Class 3 severe obesity without serious comorbidity with body mass index (BMI) of 40 0 to 44 9 in adult, unspecified obesity type ([address] )      Activities as tolerated    Diet: low carb/low fat, more greens/vegetables, adequate hydration  Exercise: try to maintain a low impact exercise regimen as much as possible  Walk for 30 minutes a day for at least 3 days a week    Encouraged to maintain good sleep hygiene  Continue other medications as prescribed by PCP and other specialists        RTC in 4 months          HPI    INITIAL VISIT NOTE:  Mr Villela is a 52-year-old male with history significant for psoriatic arthritis and obesity, who presents for further management of psoriatic arthritis  Vaughn Ordonez is transitioning care from Dr Payne Solid is currently on subcutaneous Enbrel 50 mg weekly      Patient states he was diagnosed with psoriatic arthritis in the mid 1990s, when he presented with severe pain and swelling of his feet   He states the symptoms progressed rapidly over time to the point where he was using crutches for ambulating   He was seen by multiple specialists including Podiatry and Orthopedics, and treated for plantar fasciitis without any relief  Frederic Mckinney also underwent an EMG/NCS study of his lower extremities which was unrevealing   Eventually he was referred to a rheumatologist and immediately diagnosed with psoriatic arthritis  Frederic Mckinney states initially he was started on an oral DMARD but cannot recall the name  Frederic Mckinney states it made him very sun sensitive so it was discontinued   Following this he was started on subcutaneous Enbrel 50 mg weekly which he has been maintained on since then  Frederic Mckinney has been tolerating the Enbrel well without any noticeable side effects and there have been no concerns for recurrent infections  Frederic Mckinney states briefly he was switched from Enbrel to Humira for a short period of time, but for various reasons he disliked the Humira and was eventually transitioned back to Rocco Li states the change was made due to concerns for continued inflammation at a few finger joints      Currently he denies any joint pains, and states he does not experience any frequent flare-ups   On occasion the only joints that do bother him is his left hand thumb, index and middle fingers   He states the pain resolves spontaneously   Recently he did have an episode of right knee swelling after prolonged activities and was seen by his orthopedic doctor who thought it was related to the activities he was doing  Frederic Mckinney was administered an intra-articular cortisone injection which helped him   Since then he has not experienced any joint swelling   He does experience morning stiffness which affects him diffusely but more commonly affects his back, which he states lasts a few minutes  Nkechi Pena has been no requirement for him to take any over-the-counter pain medications  Frederic Mckinney has also not had any significant flare ups in the psoriasis  Frederic Mckinney denies any other complaints at today's visit         4/16/2019:  Patient presents for follow up of psoriatic arthritis  He is currently on subcutaneous Enbrel 50 mg weekly  We reviewed the tests done following his prior office visit which showed an unremarkable CBC, CMP, ESR, chronic hepatitis panel, QuantiFERON, rheumatoid factor, anti-CCP antibody and uric acid  CRP was mildly elevated at 5 9  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 which was unchanged since 2013  XR of the right hand showed osteoarthritis at the 1st [address] joint, as well as osteoarthritis of the 2nd and 3rd MCP joints       He has been tolerating the Enbrel well without any concerns for side effects or recurrent infections  He denies fevers, weight loss, new skin rash or mouth/nose ulcers  He has a chronic itchy skin rash on his right lower leg which he states even though is present, seems to be better controlled on the Enbrel  This has previously been attributed to psoriasis  No other skin rashes noted      His joint pains have overall been well controlled, although intermittently he will experience pain affecting his hands (predominantly the left thumb) and more recently his right elbow  The right elbow pain has been aggravated with activities and he does endorse frequent overuse 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  The symptoms do not occur everyday  No other significant joint pains noted  No swelling noted  He reports morning stiffness which affects him diffusely and lasts a few minutes  10/3/2019:  Patient presents for a follow-up of psoriatic arthritis  He is currently on subcutaneous Enbrel 50 milligram weekly  Patient reports he is currently doing well, and denies any joint pains, swelling or significant morning stiffness  He does experience some mild diffuse stiffness when he first wakes up that improves after he takes a hot shower    He did take the meloxicam as needed for the right elbow epicondylitis, and states that his symptoms significantly improved  He was experiencing a flare-up of pain affecting his right knee and was seen by Orthopedics and underwent an MRI  I do not have the reports available to me, but he was advised that there may be a very minor hairline fracture  Surgical options were discussed with him, but patient opted not to pursue this during his work season  Currently he is being managed with meloxicam 15 milligram daily as needed, and states that this has significantly been helping with the right knee and back pain  He is doing well at this time  He has been tolerating the Enbrel well without any concerns for side effects or infections  The following portions of the patient's history were reviewed and updated as appropriate: allergies, current medications, past family history, past medical history, past social history, past surgical history and problem list       Review of Systems  Constitutional: Negative for weight change, fevers, chills, night sweats, fatigue  ENT/Mouth: Negative for hearing changes, ear pain, nasal congestion, sinus pain, hoarseness, sore throat, rhinorrhea, swallowing difficulty  Eyes: Negative for pain, redness, discharge, vision changes  Cardiovascular: Negative for chest pain, SOB, palpitations  Respiratory: Negative for cough, sputum, wheezing, dyspnea  Gastrointestinal: Negative for nausea, vomiting, diarrhea, constipation, pain, heartburn  Genitourinary: Negative for dysuria, urinary frequency, hematuria  Musculoskeletal: As per HPI  Skin: Negative for skin rash, color changes  Neuro: Negative for weakness, numbness, tingling, loss of consciousness  Psych: Negative for anxiety, depression  Heme/Lymph: Negative for easy bruising, bleeding, lymphadenopathy          Past Medical History:   Diagnosis Date    GERD (gastroesophageal reflux disease)     Psoriatic arthritis ([facility] [address] )        Past Surgical History:   Procedure Laterality Date    HIP SURGERY         Social History Socioeconomic History    Marital status: /Civil Union     Spouse name: Not on file    Number of children: Not on file    Years of education: Not on file    Highest education level: Not on file   Occupational History    Not on file   Social Needs    Financial resource strain: Not on file    Food insecurity:     Worry: Not on file     Inability: Not on file    Transportation needs:     Medical: Not on file     Non-medical: Not on file   Tobacco Use    Smoking status: Never Smoker    Smokeless tobacco: Never Used   Substance and Sexual Activity    Alcohol use: No    Drug use: No    Sexual activity: Not on file   Lifestyle    Physical activity:     Days per week: Not on file     Minutes per session: Not on file    Stress: Not on file   Relationships    Social connections:     Talks on phone: Not on file     Gets together: Not on file     Attends Bahai service: Not on file     Active member of club or organization: Not on file     Attends meetings of clubs or organizations: Not on file     Relationship status: Not on file    Intimate partner violence:     Fear of current or ex partner: Not on file     Emotionally abused: Not on file     Physically abused: Not on file     Forced sexual activity: Not on file   Other Topics Concern    Not on file   Social History Narrative    Not on file       Family History   Problem Relation Age of Onset    No Known Problems Mother     No Known Problems Father     No Known Problems Sister     No Known Problems Brother     No Known Problems Daughter     No Known Problems Maternal Grandmother     No Known Problems Maternal Grandfather     No Known Problems Paternal Grandmother     No Known Problems Paternal Grandfather     No Known Problems Maternal Aunt     No Known Problems Maternal Uncle     No Known Problems Paternal Aunt     No Known Problems Paternal Uncle     No Known Problems Cousin     Alcohol abuse Neg Hx     Arthritis Neg Hx     Dementia Neg Hx     Cancer Neg Hx     COPD Neg Hx     Depression Neg Hx     Lupus Neg Hx     Drug abuse Neg Hx     Early death Neg Hx     Hearing loss Neg Hx     Hyperlipidemia Neg Hx     Learning disabilities Neg Hx     Substance Abuse Neg Hx     Stroke Neg Hx     Vision loss Neg Hx     Pulmonary embolism Neg Hx     Deep vein thrombosis Neg Hx     Mental illness Neg Hx     Crohn's disease Neg Hx     Rheum arthritis Neg Hx     Psoriasis Neg Hx     Asthma Neg Hx        No Known Allergies      Current Outpatient Medications:     ENBREL 50 MG/ML SOSY, Inject 1 ml (50 mg) under the skin once a week, Disp: 4 Syringe, Rfl: 5    esomeprazole (NexIUM) 40 MG capsule, Take 40 mg by mouth every morning before breakfast, Disp: , Rfl:     lisinopril (ZESTRIL) 5 mg tablet, , Disp: , Rfl: 0    meloxicam (MOBIC) 15 mg tablet, Take 1 tablet (15 mg total) by mouth daily as needed for moderate pain, Disp: 30 tablet, Rfl: 0      Objective:    Vitals:    10/03/19 1356   BP: 122/77   BP Location: Left arm   Patient Position: Sitting   Cuff Size: Extra-Large   Pulse: 58   Weight: 126 kg (277 lb 3 2 oz)   Height: 5' 9" (1 753 m)       Physical Exam  General: Well appearing, well nourished, in no distress  Oriented x 3, normal mood and affect  Ambulating without difficulty  Obese  Skin: Good turgor, no rash, unusual bruising or prominent lesions  There is no psoriasis  Hair: Normal texture and distribution  Nails: Normal color, no deformities  HEENT:  Head: Normocephalic, atraumatic  Eyes: Conjunctiva clear, sclera non-icteric, EOM intact  Nose: No external lesions, mucosa non-inflamed  Mouth: Mucous membranes moist, no mucosal lesions  Extremities: No amputations or deformities, cyanosis, edema  Musculoskeletal:   Hands - he has a slightly puffy appearance to his hands bilaterally, especially over the bilateral 2nd and 3rd MCPs    I do not think that there is synovitis present, and he is nontender to palpation  His DIPs, PIPs and MCPs bilaterally are otherwise unremarkable  His thumbs are also unremarkable  He does have slightly enlarged appearing digits, but I think this is his baseline  There is no dactylitis  Wrists, elbows and shoulders - unremarkable  Hips - status post bilateral total hip replacement  Knees, ankles and feet - unremarkable  There is no enthesitis  Neurologic: Alert and oriented  No focal neurological deficits appreciated  Psychiatric: Normal mood and affect  SOBEIDA Simms    Rheumatology

## 2020-01-07 LAB — HBA1C MFR BLD HPLC: 5.6 %

## 2020-01-14 ENCOUNTER — TELEPHONE (OUTPATIENT)
Dept: OBGYN CLINIC | Facility: HOSPITAL | Age: 50
End: 2020-01-14

## 2020-01-14 NOTE — TELEPHONE ENCOUNTER
Patient called to state he had bloodwork done last week @ [facility]   Dr Neil Hayden wanted him to have the bloodwork, however, it was ordered by another doctor  He asked if we can reach out to [facility] if we haven't received his records yet  He states he doesn't know who to call for them, and is driving, and it's easier when the doctor's office handles these things  Advised I would check on it for him      Callback ZD#[phone]

## 2020-01-24 NOTE — PROGRESS NOTES
Assessment and Plan:   Mr Villela is a 49-year-old male with history significant for psoriatic arthritis and obesity, who presents for follow up of psoriatic arthritis  Arelironni Johnson is currently on subcutaneous Enbrel 50 mg weekly      # Psoriatic arthritis  - Christopher presents today for follow-up of psoriatic arthritis which has been well controlled on subcutaneous Enbrel 50 mg weekly and meloxicam 15 milligram daily as needed  He has not had a flare-up of his arthritic complaints, and is doing well at this time  In view of this I recommend he continue the subcutaneous Enbrel 50 milligram weekly  He is up-to-date with high-risk medication lab monitoring   - I advised him to call in the interim with any concerns or flare-up in symptoms  # Cervical degenerative arthritis  - He was found to have mild degenerative arthritis of his cervical spine in 2017, and it is possible that this may have progressed leading to the periodic neck pain and radicular symptoms he has been experiencing  I do suspect that the majority of his neck pain may also be muscular in nature  He will continue with conservative measures at home for now, but I requested he contact the office if he would like to consider repeat cervical spine imaging or a referral to Pain Management  Plan:  Diagnoses and all orders for this visit:    Psoriatic arthropathy ([address] )    Long-term use of immunosuppressant medication    Osteoarthritis of cervical spine, unspecified spinal osteoarthritis complication status    Psoriatic arthritis (HCC)  -     etanercept (ENBREL) 50 mg/mL SOSY; Inject 1 mL (50 mg total) under the skin once a week      Activities as tolerated    Diet: low carb/low fat, more greens/vegetables, adequate hydration  Exercise: try to maintain a low impact exercise regimen as much as possible  Walk for 30 minutes a day for at least 3 days a week    Encouraged to maintain good sleep hygiene    Continue other medications as prescribed by PCP and other specialists        RTC in 1 year          HPI    INITIAL VISIT NOTE:  Mr Villela is a 64-year-old male with history significant for psoriatic arthritis and obesity, who presents for further management of psoriatic arthritis  Ednakenny Simon is transitioning care from Dr Florencio Choe is currently on subcutaneous Enbrel 50 mg weekly      Patient states he was diagnosed with psoriatic arthritis in the mid 1990s, when he presented with severe pain and swelling of his feet   He states the symptoms progressed rapidly over time to the point where he was using crutches for ambulating  Ednakenny Simon was seen by multiple specialists including Podiatry and Orthopedics, and treated for plantar fasciitis without any relief   He also underwent an EMG/NCS study of his lower extremities which was unrevealing   Eventually he was referred to a rheumatologist and immediately diagnosed with psoriatic arthritis  Ednakenny Simon states initially he was started on an oral DMARD but cannot recall the name  Edna Simon states it made him very sun sensitive so it was discontinued   Following this he was started on subcutaneous Enbrel 50 mg weekly which he has been maintained on since then  Edna Simon has been tolerating the Enbrel well without any noticeable side effects and there have been no concerns for recurrent infections  Ednakenny Simon states briefly he was switched from Enbrel to Humira for a short period of time, but for various reasons he disliked the Humira and was eventually transitioned back to Kalina Sahara states the change was made due to concerns for continued inflammation at a few finger joints      Currently he denies any joint pains, and states he does not experience any frequent flare-ups   On occasion the only joints that do bother him is his left hand thumb, index and middle fingers   He states the pain resolves spontaneously   Recently he did have an episode of right knee swelling after prolonged activities and was seen by his orthopedic doctor who thought it was related to the activities he was doing  Nitesh Woods was administered an intra-articular cortisone injection which helped him   Since then he has not experienced any joint swelling   He does experience morning stiffness which affects him diffusely but more commonly affects his back, which he states lasts a few minutes  Yuanyodit Earll has been no requirement for him to take any over-the-counter pain medications  Nitesh Woods has also not had any significant flare ups in the psoriasis  Nitesh Woods denies any other complaints at today's visit         4/16/2019:  Patient presents for follow up of psoriatic arthritis  Nitesh Woods is currently on subcutaneous Enbrel 50 mg weekly   We reviewed the tests done following his prior office visit which showed an unremarkable CBC, CMP, ESR, chronic hepatitis panel, QuantiFERON, rheumatoid factor, anti-CCP antibody and uric acid   CRP was mildly elevated at 5 9   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 which was unchanged since 2013   XR of the right hand showed osteoarthritis at the [address]st [address] joint, as well as osteoarthritis of the 2nd and 3rd MCP joints       He has been tolerating the Enbrel well without any concerns for side effects or recurrent infections   He denies fevers, weight loss, new skin rash or mouth/nose ulcers   He has a chronic itchy skin rash on his right lower leg which he states even though is present, seems to be better controlled on the Enbrel   This has previously been attributed to psoriasis   No other skin rashes noted      His joint pains have overall been well controlled, although intermittently he will experience pain affecting his hands (predominantly the left thumb) and more recently his right elbow   The right elbow pain has been aggravated with activities and he does endorse frequent overuse 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   The symptoms do not occur everyday   No other significant joint pains noted   No swelling noted   He reports morning stiffness which affects him diffusely and lasts a few minutes          10/3/2019:  Patient presents for a follow-up of psoriatic arthritis  He is currently on subcutaneous Enbrel 50 milligram weekly      Patient reports he is currently doing well, and denies any joint pains, swelling or significant morning stiffness  He does experience some mild diffuse stiffness when he first wakes up that improves after he takes a hot shower  He did take the meloxicam as needed for the right elbow epicondylitis, and states that his symptoms significantly improved  He was experiencing a flare-up of pain affecting his right knee and was seen by Orthopedics and underwent an MRI  I do not have the reports available to me, but he was advised that there may be a very minor hairline fracture  Surgical options were discussed with him, but patient opted not to pursue this during his work season  Currently he is being managed with meloxicam 15 milligram daily as needed, and states that this has significantly been helping with the right knee and back pain  He is doing well at this time  He has been tolerating the Enbrel well without any concerns for side effects or infections        1/27/2020:  Patient presents for a follow-up of psoriatic arthritis  He is currently on subcutaneous Enbrel 50 mg once weekly  I reviewed his recently done CBC and CMP which were unremarkable  Patient reports he has not had any significant flare-up in his joint pains since the last office visit  He continues to experience occasional pain and swelling at his bilateral thumbs, which is not new, and is slightly improved from the last office visit  There have been no new joint pains, swelling or stiffness  Recently he has been experiencing periodic flare-ups of neck pain associated with stiffness  He is known to have mild degenerative arthritis of his cervical spine    He was seen by his primary care physician and prescribed a Medrol Dosepak which did help  He mentions he will continue with conservative measures at home to manage his neck pain, and contact the office if he would like repeat evaluation with neck imaging or referral to the [facility]  He has been tolerating the Enbrel well without any concerns for side effects or recurrent infections  No new complaints noted today  The following portions of the patient's history were reviewed and updated as appropriate: allergies, current medications, past family history, past medical history, past social history, past surgical history and problem list       Review of Systems  Constitutional: Negative for weight change, fevers, chills, night sweats, fatigue  ENT/Mouth: Negative for hearing changes, ear pain, nasal congestion, sinus pain, hoarseness, sore throat, rhinorrhea, swallowing difficulty  Eyes: Negative for pain, redness, discharge, vision changes  Cardiovascular: Negative for chest pain, SOB, palpitations  Respiratory: Negative for cough, sputum, wheezing, dyspnea  Gastrointestinal: Negative for nausea, vomiting, diarrhea, constipation, pain, heartburn  Genitourinary: Negative for dysuria, urinary frequency, hematuria  Musculoskeletal: As per HPI  Skin: Negative for skin rash, color changes  Neuro: Negative for weakness, numbness, tingling, loss of consciousness  Psych: Negative for anxiety, depression  Heme/Lymph: Negative for easy bruising, bleeding, lymphadenopathy          Past Medical History:   Diagnosis Date    GERD (gastroesophageal reflux disease)     [facility]        Past Surgical History:   Procedure Laterality Date    HIP SURGERY         Social History     Socioeconomic History    Marital status: /Civil Union     Spouse name: Not on file    Number of children: Not on file    Years of education: Not on file    Highest education level: Not on file   Occupational History    Not on file Social Needs    Financial resource strain: Not on file    Food insecurity:     Worry: Not on file     Inability: Not on file    Transportation needs:     Medical: Not on file     Non-medical: Not on file   Tobacco Use    Smoking status: Never Smoker    Smokeless tobacco: Never Used   Substance and Sexual Activity    Alcohol use: No    Drug use: No    Sexual activity: Not on file   Lifestyle    Physical activity:     Days per week: Not on file     Minutes per session: Not on file    Stress: Not on file   Relationships    Social connections:     Talks on phone: Not on file     Gets together: Not on file     Attends Worship service: Not on file     Active member of club or organization: Not on file     Attends meetings of clubs or organizations: Not on file     Relationship status: Not on file    Intimate partner violence:     Fear of current or ex partner: Not on file     Emotionally abused: Not on file     Physically abused: Not on file     Forced sexual activity: Not on file   Other Topics Concern    Not on file   Social History Narrative    Not on file       Family History   Problem Relation Age of Onset    No Known Problems Mother     No Known Problems Father     No Known Problems Sister     No Known Problems Brother     No Known Problems Daughter     No Known Problems Maternal Grandmother     No Known Problems Maternal Grandfather     No Known Problems Paternal Grandmother     No Known Problems Paternal Grandfather     No Known Problems Maternal Aunt     No Known Problems Maternal Uncle     No Known Problems Paternal Aunt     No Known Problems Paternal Uncle     No Known Problems Cousin     Alcohol abuse Neg Hx     Arthritis Neg Hx     Dementia Neg Hx     Cancer Neg Hx     COPD Neg Hx     Depression Neg Hx     Lupus Neg Hx     Drug abuse Neg Hx     Early death Neg Hx     Hearing loss Neg Hx     Hyperlipidemia Neg Hx     Learning disabilities Neg Hx     Substance Abuse Neg Hx     Stroke Neg Hx     Vision loss Neg Hx     Pulmonary embolism Neg Hx     Deep vein thrombosis Neg Hx     Mental illness Neg Hx     Crohn's disease Neg Hx     Rheum arthritis Neg Hx     Psoriasis Neg Hx     Asthma Neg Hx        No Known Allergies      Current Outpatient Medications:     lisinopril (ZESTRIL) 5 mg tablet, Take 5 mg by mouth daily, Disp: , Rfl:     baclofen 10 mg tablet, take 1 tablet by mouth at bedtime for 14 days, Disp: , Rfl:     esomeprazole (NexIUM) 40 MG capsule, Take 40 mg by mouth every morning before breakfast, Disp: , Rfl:     etanercept (ENBREL) 50 mg/mL SOSY, Inject 1 mL (50 mg total) under the skin once a week, Disp: 4 Syringe, Rfl: 5    ETANERCEPT SC, Inject under the skin, Disp: , Rfl:     lisinopril (ZESTRIL) 5 mg tablet, , Disp: , Rfl: 0    meloxicam (MOBIC) 15 mg tablet, Take 1 tablet (15 mg total) by mouth daily as needed for moderate pain, Disp: 30 tablet, Rfl: 0    methylPREDNISolone 4 MG tablet therapy pack, TAKE AS DIRECTED ON INSIDE OF PACKAGE, Disp: , Rfl:     polyethylene glycol-electrolytes (NULYTELY) 4000 mL solution, Take by mouth Take as directed, Disp: , Rfl:     RA LAXATIVE 5 MG EC tablet, take 2 tablets by mouth for COLONOSCOPY PREP-SEE INSTRUCTION SHEET, Disp: , Rfl:     tamsulosin (FLOMAX) 0 4 mg, Take 0 4 mg by mouth daily, Disp: , Rfl:       Objective:    Vitals:    01/27/20 0931   BP: 153/79   BP Location: Left arm   Patient Position: Sitting   Cuff Size: Extra-Large   Pulse: 66   Weight: 129 kg (285 lb 6 4 oz)   Height: 5' 9" (1 753 m)       Physical Exam  General: Well appearing, well nourished, in no distress  Oriented x 3, normal mood and affect  Ambulating without difficulty  Obese  Skin: Good turgor, no rash, unusual bruising or prominent lesions  No psoriasis  Hair: Normal texture and distribution  Nails: Normal color, no deformities  No pitting  HEENT:  Head: Normocephalic, atraumatic    Eyes: Conjunctiva clear, sclera non-icteric, EOM intact  Nose: No external lesions, mucosa non-inflamed  Mouth: Mucous membranes moist, no mucosal lesions  Neck: Supple  Extremities: No amputations or deformities, cyanosis, edema  Musculoskeletal:   Hands - he has a slightly puffy appearance overlying his bilateral 2nd and 3rd MCPs, but there is no tenderness noted  His bilateral DIPs and PIPs do not show any evidence of synovitis or tenderness  There does appear to be mild diffuse swelling at his left thumb without tenderness noted  He does have slightly enlarged appearing digits, but I think this is his baseline  There is no obvious dactylitis  Wrists, elbows and shoulders - unremarkable  Hips - status post bilateral total hip replacement  Knees, ankles and feet - unremarkable  There is no enthesitis  Neurologic: Alert and oriented  No focal neurological deficits appreciated  Psychiatric: Normal mood and affect  SOBEIDA Petersen    Rheumatology

## 2020-01-27 ENCOUNTER — OFFICE VISIT (OUTPATIENT)
Dept: RHEUMATOLOGY | Facility: CLINIC | Age: 50
End: 2020-01-27
Payer: COMMERCIAL

## 2020-01-27 VITALS
BODY MASS INDEX: 42.27 KG/M2 | DIASTOLIC BLOOD PRESSURE: 79 MMHG | HEART RATE: 66 BPM | WEIGHT: 285.4 LBS | SYSTOLIC BLOOD PRESSURE: 153 MMHG | HEIGHT: 69 IN

## 2020-01-27 DIAGNOSIS — Z79.899 LONG-TERM USE OF IMMUNOSUPPRESSANT MEDICATION: ICD-10-CM

## 2020-01-27 DIAGNOSIS — M47.812 OSTEOARTHRITIS OF CERVICAL SPINE, UNSPECIFIED SPINAL OSTEOARTHRITIS COMPLICATION STATUS: ICD-10-CM

## 2020-01-27 DIAGNOSIS — L40.50 PSORIATIC ARTHROPATHY (HCC): Primary | ICD-10-CM

## 2020-01-27 DIAGNOSIS — L40.50 PSORIATIC ARTHRITIS (HCC): ICD-10-CM

## 2020-01-27 PROCEDURE — 99214 OFFICE O/P EST MOD 30 MIN: CPT | Performed by: INTERNAL MEDICINE

## 2020-01-27 RX ORDER — IBUPROFEN 200 MG/1
TABLET ORAL
COMMUNITY
Start: 2020-01-09

## 2020-01-27 RX ORDER — TAMSULOSIN HYDROCHLORIDE 0.4 MG/1
0.4 CAPSULE ORAL DAILY
COMMUNITY
Start: 2020-01-21

## 2020-01-27 RX ORDER — BACLOFEN 10 MG/1
TABLET ORAL
COMMUNITY
Start: 2020-01-07

## 2020-01-27 RX ORDER — LISINOPRIL 5 MG/1
5 TABLET ORAL DAILY
COMMUNITY
Start: 2020-01-09

## 2020-01-27 RX ORDER — METHYLPREDNISOLONE 4 MG/1
TABLET ORAL
COMMUNITY
Start: 2020-01-07

## 2020-01-27 RX ORDER — POLYETHYLENE GLYCOL 3350, SODIUM CHLORIDE, SODIUM BICARBONATE, POTASSIUM CHLORIDE 420; 11.2; 5.72; 1.48 G/4L; G/4L; G/4L; G/4L
POWDER, FOR SOLUTION ORAL
COMMUNITY
Start: 2020-01-09

## 2020-09-24 ENCOUNTER — TELEPHONE (OUTPATIENT)
Dept: OBGYN CLINIC | Facility: HOSPITAL | Age: 50
End: 2020-09-24

## 2020-09-24 DIAGNOSIS — L40.50 PSORIATIC ARTHRITIS (HCC): ICD-10-CM

## 2020-09-24 RX ORDER — ETANERCEPT 50 MG/ML
50 SOLUTION SUBCUTANEOUS WEEKLY
Qty: 4 SYRINGE | Refills: 5 | Status: SHIPPED | OUTPATIENT
Start: 2020-09-24 | End: 2021-03-29 | Stop reason: SDUPTHER

## 2020-09-24 NOTE — TELEPHONE ENCOUNTER
I sent this to the [address]  Please let patient know and confirm that this is the right pharmacy  Please also inform him that the pharmacy had not reached out to us and this is the first that I am hearing about a med refill  Thanks

## 2020-10-29 ENCOUNTER — TELEPHONE (OUTPATIENT)
Dept: OBGYN CLINIC | Facility: HOSPITAL | Age: 50
End: 2020-10-29

## 2021-01-05 ENCOUNTER — TELEPHONE (OUTPATIENT)
Dept: OBGYN CLINIC | Facility: HOSPITAL | Age: 51
End: 2021-01-05

## 2021-01-05 DIAGNOSIS — L40.50 PSORIATIC ARTHRITIS (HCC): Primary | ICD-10-CM

## 2021-01-05 DIAGNOSIS — E78.5 HYPERLIPIDEMIA, UNSPECIFIED HYPERLIPIDEMIA TYPE: ICD-10-CM

## 2021-01-05 NOTE — TELEPHONE ENCOUNTER
All I need from my perspective is the labs already ordered, but I can add on a lipid panel  If he wants more blood work he should call his PCP

## 2021-01-05 NOTE — TELEPHONE ENCOUNTER
Patient is calling to ask if he needs labs done prior to his appointment on 01/21? If so , he would like to get them done asap

## 2021-01-15 LAB
ALBUMIN SERPL-MCNC: 4.6 G/DL (ref 3.8–4.9)
ALBUMIN/GLOB SERPL: 1.8 {RATIO} (ref 1.2–2.2)
ALP SERPL-CCNC: 46 IU/L (ref 39–117)
ALT SERPL-CCNC: 51 IU/L (ref 0–44)
AST SERPL-CCNC: 39 IU/L (ref 0–40)
BASOPHILS # BLD AUTO: 0 X10E3/UL (ref 0–0.2)
BASOPHILS NFR BLD AUTO: 0 %
BILIRUB SERPL-MCNC: 0.5 MG/DL (ref 0–1.2)
BUN SERPL-MCNC: 17 MG/DL (ref 6–24)
BUN/CREAT SERPL: 17 (ref 9–20)
CALCIUM SERPL-MCNC: 9.6 MG/DL (ref 8.7–10.2)
CHLORIDE SERPL-SCNC: 100 MMOL/L (ref 96–106)
CHOLEST SERPL-MCNC: 214 MG/DL (ref 100–199)
CO2 SERPL-SCNC: 24 MMOL/L (ref 20–29)
CREAT SERPL-MCNC: 1 MG/DL (ref 0.76–1.27)
CRP SERPL-MCNC: 4 MG/L (ref 0–10)
EOSINOPHIL # BLD AUTO: 0.1 X10E3/UL (ref 0–0.4)
EOSINOPHIL NFR BLD AUTO: 2 %
ERYTHROCYTE [DISTWIDTH] IN BLOOD BY AUTOMATED COUNT: 13.2 % (ref 11.6–15.4)
ERYTHROCYTE [SEDIMENTATION RATE] IN BLOOD BY WESTERGREN METHOD: 21 MM/HR (ref 0–30)
GLOBULIN SER-MCNC: 2.5 G/DL (ref 1.5–4.5)
GLUCOSE SERPL-MCNC: 103 MG/DL (ref 65–99)
HCT VFR BLD AUTO: 42.6 % (ref 37.5–51)
HDLC SERPL-MCNC: 56 MG/DL
HGB BLD-MCNC: 14.6 G/DL (ref 13–17.7)
IMM GRANULOCYTES # BLD: 0 X10E3/UL (ref 0–0.1)
IMM GRANULOCYTES NFR BLD: 0 %
LDLC SERPL CALC-MCNC: 138 MG/DL (ref 0–99)
LYMPHOCYTES # BLD AUTO: 1.6 X10E3/UL (ref 0.7–3.1)
LYMPHOCYTES NFR BLD AUTO: 33 %
MCH RBC QN AUTO: 30 PG (ref 26.6–33)
MCHC RBC AUTO-ENTMCNC: 34.3 G/DL (ref 31.5–35.7)
MCV RBC AUTO: 88 FL (ref 79–97)
MONOCYTES # BLD AUTO: 0.5 X10E3/UL (ref 0.1–0.9)
MONOCYTES NFR BLD AUTO: 11 %
NEUTROPHILS # BLD AUTO: 2.5 X10E3/UL (ref 1.4–7)
NEUTROPHILS NFR BLD AUTO: 54 %
PLATELET # BLD AUTO: 217 X10E3/UL (ref 150–450)
POTASSIUM SERPL-SCNC: 4.7 MMOL/L (ref 3.5–5.2)
PROT SERPL-MCNC: 7.1 G/DL (ref 6–8.5)
RBC # BLD AUTO: 4.86 X10E6/UL (ref 4.14–5.8)
SL AMB EGFR AFRICAN AMERICAN: 100 ML/MIN/1.73
SL AMB EGFR NON AFRICAN AMERICAN: 86 ML/MIN/1.73
SL AMB VLDL CHOLESTEROL CALC: 20 MG/DL (ref 5–40)
SODIUM SERPL-SCNC: 138 MMOL/L (ref 134–144)
TRIGL SERPL-MCNC: 113 MG/DL (ref 0–149)
WBC # BLD AUTO: 4.7 X10E3/UL (ref 3.4–10.8)

## 2021-01-17 NOTE — PROGRESS NOTES
Assessment and Plan:   Mr Villela is a 40-year-old male with history significant for psoriatic arthritis and obesity, who presents for follow up of psoriatic arthritis  [facility] is currently on subcutaneous Enbrel 50 mg weekly      # Psoriatic arthritis and psoriasis  - Christopher presents today for follow-up of psoriatic arthritis which is currently managed with subcutaneous Enbrel 50 mg once weekly and meloxicam 15 mg daily as needed which he takes very sparingly  He reports over the past few months he has experienced increased achiness especially affecting his bilateral knees and shoulders  I suspect his symptoms are likely related to overuse activities versus osteoarthritis as I am not appreciating any significant new evidence of synovitis/enthesitis/dactylitis  I recommend he continue follow-up with Orthopedics and Podiatry, but in order to ensure that his symptoms are not mildly related to the psoriatic arthritis I offered him a change in DMARD therapy  He is agreeable to switching to subcutaneous Orencia 125 mg once weekly  I reviewed the side effects with him including but not limited to risk for infections  I advised him over the next few months if there is no improvement noted with the Orencia we can discuss changing back to Enbrel  In that case I recommend he continue follow-up with his other providers for management of noninflammatory arthralgias  - I advised him to utilize the meloxicam 15 mg once daily as needed  # Cervical degenerative arthritis  - He was found to have mild degenerative arthritis of his cervical spine in 2017, and it is possible that this may have progressed leading to the periodic neck pain and radicular symptoms he has been experiencing  I do suspect that the majority of his neck pain may also be muscular in nature    He will continue with conservative measures at home for now, but I requested he contact the office if he would like to consider repeat cervical spine imaging or a referral to Pain Management  I have spent 30 minutes with Patient  today in which greater than 50% of this time was spent in counseling/coordination of care regarding Risks and benefits of tx options and Impressions  Plan:  Diagnoses and all orders for this visit:    Psoriatic arthropathy ([address] )    Psoriasis    Long-term use of immunosuppressant medication    Primary generalized (osteo)arthritis    Osteoarthritis of cervical spine, unspecified spinal osteoarthritis complication status    Other orders  -     Ibuprofen 200 MG CAPS; Ibuprofen 200 MG Oral Capsule  TAKE CAPSULE  PRN as needed    Refills: 0       Active  -     pravastatin (PRAVACHOL) 20 mg tablet; Take 20 mg by mouth      Activities as tolerated  Exercise: try to maintain a low impact exercise regimen as much as possible  Walk for 30 minutes a day for at least 3 days a week  Continue other medications as prescribed by PCP and other specialists  RTC in 1 year         HPI    INITIAL VISIT NOTE:  Mr Villela is a 49-year-old male with history significant for psoriatic arthritis and obesity, who presents for further management of psoriatic arthritis  Viridiana Marvin is transitioning care from Dr Arcos Linker is currently on subcutaneous Enbrel 50 mg weekly      Patient states he was diagnosed with psoriatic arthritis in the mid 1990s, when he presented with severe pain and swelling of his feet   He states the symptoms progressed rapidly over time to the point where he was using crutches for ambulating  [place] was seen by multiple specialists including Podiatry and Orthopedics, and treated for plantar fasciitis without any relief   He also underwent an EMG/NCS study of his lower extremities which was unrevealing   Eventually he was referred to a rheumatologist and immediately diagnosed with psoriatic arthritis  Viridiana Marvin states initially he was started on an oral DMARD but cannot recall the name  Viridiana Marvin states it made him very sun sensitive so it was discontinued   Following this he was started on subcutaneous Enbrel 50 mg weekly which he has been maintained on since then  [place] has been tolerating the Enbrel well without any noticeable side effects and there have been no concerns for recurrent infections  [place] states briefly he was switched from Enbrel to Humira for a short period of time, but for various reasons he disliked the Humira and was eventually transitioned back to Nancy Shadow states the change was made due to concerns for continued inflammation at a few finger joints      Currently he denies any joint pains, and states he does not experience any frequent flare-ups   On occasion the only joints that do bother him is his left hand thumb, index and middle fingers   He states the pain resolves spontaneously   Recently he did have an episode of right knee swelling after prolonged activities and was seen by his orthopedic doctor who thought it was related to the activities he was doing  [place] was administered an intra-articular cortisone injection which helped him   Since then he has not experienced any joint swelling   He does experience morning stiffness which affects him diffusely but more commonly affects his back, which he states lasts a few minutes  Dayron Mcnulty has been no requirement for him to take any over-the-counter pain medications  [place] has also not had any significant flare ups in the psoriasis  [place] denies any other complaints at today's visit         4/16/2019:  Patient presents for follow up of psoriatic arthritis  [place] is currently on subcutaneous Enbrel 50 mg weekly   We reviewed the tests done following his prior office visit which showed an unremarkable CBC, CMP, ESR, chronic hepatitis panel, QuantiFERON, rheumatoid factor, anti-CCP antibody and uric acid   CRP was mildly elevated at 5 9   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 which was unchanged since 2013   XR of the right hand showed osteoarthritis at the 1st [facility] joint, as well as osteoarthritis of the 2nd and 3rd MCP joints       He has been tolerating the Enbrel well without any concerns for side effects or recurrent infections   He denies fevers, weight loss, new skin rash or mouth/nose ulcers   He has a chronic itchy skin rash on his right lower leg which he states even though is present, seems to be better controlled on the Enbrel   This has previously been attributed to psoriasis   No other skin rashes noted      His joint pains have overall been well controlled, although intermittently he will experience pain affecting his hands (predominantly the left thumb) and more recently his right elbow   The right elbow pain has been aggravated with activities and he does endorse frequent overuse 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   The symptoms do not occur everyday   No other significant joint pains noted   No swelling noted   He reports morning stiffness which affects him diffusely and lasts a few minutes          10/3/2019:  Patient presents for a follow-up of psoriatic arthritis  Anuradha Farmer is currently on subcutaneous Enbrel 50 milligram weekly      Patient reports he is currently doing well, and denies any joint pains, swelling or significant morning stiffness   He does experience some mild diffuse stiffness when he first wakes up that improves after he takes a hot shower   He did take the meloxicam as needed for the right elbow epicondylitis, and states that his symptoms significantly improved   He was experiencing a flare-up of pain affecting his right knee and was seen by Orthopedics and underwent an MRI   I do not have the reports available to me, but he was advised that there may be a very minor hairline fracture   Surgical options were discussed with him, but patient opted not to pursue this during his work season   Currently he is being managed with meloxicam 15 milligram daily as needed, and states that this has significantly been helping with the right knee and back pain  Andrew Ragland is doing well at this time  Andrew Ragland has been tolerating the Enbrel well without any concerns for side effects or infections         1/27/2020:  Patient presents for a follow-up of psoriatic arthritis  He is currently on subcutaneous Enbrel 50 mg once weekly  I reviewed his recently done CBC and CMP which were unremarkable      Patient reports he has not had any significant flare-up in his joint pains since the last office visit  He continues to experience occasional pain and swelling at his bilateral thumbs, which is not new, and is slightly improved from the last office visit  There have been no new joint pains, swelling or stiffness  Recently he has been experiencing periodic flare-ups of neck pain associated with stiffness  He is known to have mild degenerative arthritis of his cervical spine  He was seen by his primary care physician and prescribed a Medrol Dosepak which did help  He mentions he will continue with conservative measures at home to manage his neck pain, and contact the office if he would like repeat evaluation with neck imaging or referral to the [facility]      He has been tolerating the Enbrel well without any concerns for side effects or recurrent infections  No new complaints noted today        1/21/2021:  Patient presents for a follow-up of psoriatic arthritis  He is currently on subcutaneous Enbrel 50 mg once weekly  I reviewed his recently done CBC, CMP, ESR and CRP which were unremarkable  The lipid panel showed a mildly elevated LDL of 138  He reports over the past few months he feels like his joint pains have been flaring up  He particularly experienced symptoms affecting his bilateral knees for which he followed with Orthopedics  He had intra-articular cortisone injections administered which did not help  He completed a series of viscosupplementation which has been helping    There are plans to delay surgery as much as possible for the knee osteoarthritis  He reports intermittent pain that still affect his left thumb  He was also seeing Podiatry for pain on the anterior and lateral aspect of his right foot with concerns for a stress fracture  He reports occasional pain that also affect his bilateral shoulders  He denies any joint pains throughout his hands otherwise, wrists, elbows, hips, ankles or left foot  He denies swollen joints  He does experience morning stiffness which affects him diffusely and takes about 1 hour to improve  He has been utilizing the meloxicam very sparingly and has not been on any recent steroids  He reports the psoriasis has been stable but occasionally the lesion on his right lower leg may flare-up and he may notice small patches on other areas of his body  He has been tolerating the Enbrel well without any concerns for side effects or infections  The following portions of the patient's history were reviewed and updated as appropriate: allergies, current medications, past family history, past medical history, past social history, past surgical history and problem list       Review of Systems  Constitutional: Negative for weight change, fevers, chills, night sweats, fatigue  ENT/Mouth: Negative for hearing changes, ear pain, nasal congestion, sinus pain, hoarseness, sore throat, rhinorrhea, swallowing difficulty  Eyes: Negative for pain, redness, discharge, vision changes  Cardiovascular: Negative for chest pain, SOB, palpitations  Respiratory: Negative for cough, sputum, wheezing, dyspnea  Gastrointestinal: Negative for nausea, vomiting, diarrhea, constipation, pain, heartburn  Genitourinary: Negative for dysuria, urinary frequency, hematuria  Musculoskeletal: As per HPI  Skin: Negative for color changes  Positive for skin rash  Neuro: Negative for weakness, numbness, tingling, loss of consciousness  Psych: Negative for anxiety, depression  Heme/Lymph: Negative for easy bruising, bleeding, lymphadenopathy          Past Medical History:   Diagnosis Date    GERD (gastroesophageal reflux disease)     Psoriatic arthritis ([facility] [address] )        Past Surgical History:   Procedure Laterality Date    HIP SURGERY         Social History     Socioeconomic History    Marital status: /Civil Union     Spouse name: Not on file    Number of children: Not on file    Years of education: Not on file    Highest education level: Not on file   Occupational History    Not on file   Social Needs    Financial resource strain: Not on file    Food insecurity     Worry: Not on file     Inability: Not on file   [employer] needs     Medical: Not on file     Non-medical: Not on file   Tobacco Use    Smoking status: Never Smoker    Smokeless tobacco: Never Used   Substance and Sexual Activity    Alcohol use: No    Drug use: No    Sexual activity: Not on file   Lifestyle    Physical activity     Days per week: Not on file     Minutes per session: Not on file    Stress: Not on file   Relationships    Social connections     Talks on phone: Not on file     Gets together: Not on file     Attends Jain service: Not on file     Active member of club or organization: Not on file     Attends meetings of clubs or organizations: Not on file     Relationship status: Not on file    Intimate partner violence     Fear of current or ex partner: Not on file     Emotionally abused: Not on file     Physically abused: Not on file     Forced sexual activity: Not on file   Other Topics Concern    Not on file   Social History Narrative    Not on file       Family History   Problem Relation Age of Onset    No Known Problems Mother     No Known Problems Father     No Known Problems Sister     No Known Problems Brother     No Known Problems Daughter     No Known Problems Maternal Grandmother     No Known Problems Maternal Grandfather     No Known Problems Paternal Grandmother     No Known Problems Paternal Grandfather     No Known Problems Maternal Aunt     No Known Problems Maternal Uncle     No Known Problems Paternal Aunt     No Known Problems Paternal Uncle     No Known Problems Cousin     Alcohol abuse Neg Hx     Arthritis Neg Hx     Dementia Neg Hx     Cancer Neg Hx     COPD Neg Hx     Depression Neg Hx     Lupus Neg Hx     Drug abuse Neg Hx     Early death Neg Hx     Hearing loss Neg Hx     Hyperlipidemia Neg Hx     Learning disabilities Neg Hx     Substance Abuse Neg Hx     Stroke Neg Hx     Vision loss Neg Hx     Pulmonary embolism Neg Hx     Deep vein thrombosis Neg Hx     Mental illness Neg Hx     Crohn's disease Neg Hx     Rheum arthritis Neg Hx     Psoriasis Neg Hx     Asthma Neg Hx        No Known Allergies      Current Outpatient Medications:     pravastatin (PRAVACHOL) 20 mg tablet, Take 20 mg by mouth, Disp: , Rfl:     baclofen 10 mg tablet, take 1 tablet by mouth at bedtime for 14 days, Disp: , Rfl:     esomeprazole (NexIUM) 40 MG capsule, Take 40 mg by mouth every morning before breakfast, Disp: , Rfl:     etanercept (Enbrel) 50 mg/mL SOSY, Inject 1 mL (50 mg total) under the skin once a week, Disp: 4 Syringe, Rfl: 5    ETANERCEPT SC, Inject under the skin, Disp: , Rfl:     Ibuprofen 200 MG CAPS, Ibuprofen 200 MG Oral Capsule TAKE CAPSULE  PRN as needed   Refills: 0     Active, Disp: , Rfl:     lisinopril (ZESTRIL) 5 mg tablet, , Disp: , Rfl: 0    lisinopril (ZESTRIL) 5 mg tablet, Take 5 mg by mouth daily, Disp: , Rfl:     meloxicam (MOBIC) 15 mg tablet, Take 1 tablet (15 mg total) by mouth daily as needed for moderate pain, Disp: 30 tablet, Rfl: 0    methylPREDNISolone 4 MG tablet therapy pack, TAKE AS DIRECTED ON INSIDE OF PACKAGE, Disp: , Rfl:     polyethylene glycol-electrolytes (NULYTELY) 4000 mL solution, Take by mouth Take as directed, Disp: , Rfl:     RA LAXATIVE 5 MG EC tablet, take 2 tablets by mouth for COLONOSCOPY PREP-SEE INSTRUCTION SHEET, Disp: , Rfl:     tamsulosin (FLOMAX) 0 4 mg, Take 0 4 mg by mouth daily, Disp: , Rfl:       Objective:    Vitals:    01/21/21 0921   BP: 156/98   BP Location: Left arm   Patient Position: Sitting   Cuff Size: Extra-Large   Pulse: 72   Temp: (!) 96 5 °F (35 8 °C)       Physical Exam  General: Well appearing, well nourished, in no distress  Oriented x 3, normal mood and affect  Ambulating without difficulty  Skin: Good turgor, no unusual bruising or prominent lesions  Minimal psoriasis on the outer aspect of his right lower leg  Hair: Normal texture and distribution  Nails: Normal color, no deformities  HEENT:  Head: Normocephalic, atraumatic  Eyes: Conjunctiva clear, sclera non-icteric, EOM intact  Extremities: No amputations or deformities, cyanosis, edema  Musculoskeletal:   Hands - His bilateral DIPs and PIPs do not show any evidence of synovitis or tenderness  There does appear to be mild diffuse swelling at his left thumb without tenderness noted, but this has been chronic  He does have slightly enlarged appearing digits, but I think this is his baseline  There is no obvious dactylitis  Wrists, elbows and shoulders - unremarkable  Hips - status post bilateral total hip replacement  Knees, ankles and feet - unremarkable, but he reports with deep pressure there is a tender point near his right foot 4th metatarsal region     There is no enthesitis  Neurologic: Alert and oriented  No focal neurological deficits appreciated  Psychiatric: Normal mood and affect  SOBEIDA Sam    Rheumatology

## 2021-01-21 ENCOUNTER — TELEPHONE (OUTPATIENT)
Dept: RHEUMATOLOGY | Facility: CLINIC | Age: 51
End: 2021-01-21

## 2021-01-21 ENCOUNTER — OFFICE VISIT (OUTPATIENT)
Dept: RHEUMATOLOGY | Facility: CLINIC | Age: 51
End: 2021-01-21
Payer: COMMERCIAL

## 2021-01-21 VITALS — SYSTOLIC BLOOD PRESSURE: 156 MMHG | DIASTOLIC BLOOD PRESSURE: 98 MMHG | TEMPERATURE: 96.5 F | HEART RATE: 72 BPM

## 2021-01-21 DIAGNOSIS — L40.50 PSORIATIC ARTHROPATHY (HCC): Primary | ICD-10-CM

## 2021-01-21 DIAGNOSIS — Z79.899 LONG-TERM USE OF IMMUNOSUPPRESSANT MEDICATION: ICD-10-CM

## 2021-01-21 DIAGNOSIS — L40.9 PSORIASIS: ICD-10-CM

## 2021-01-21 DIAGNOSIS — M15.0 PRIMARY GENERALIZED (OSTEO)ARTHRITIS: ICD-10-CM

## 2021-01-21 DIAGNOSIS — M47.812 OSTEOARTHRITIS OF CERVICAL SPINE, UNSPECIFIED SPINAL OSTEOARTHRITIS COMPLICATION STATUS: ICD-10-CM

## 2021-01-21 DIAGNOSIS — Z11.1 SCREENING-PULMONARY TB: Primary | ICD-10-CM

## 2021-01-21 PROCEDURE — 99214 OFFICE O/P EST MOD 30 MIN: CPT | Performed by: INTERNAL MEDICINE

## 2021-01-21 RX ORDER — PRAVASTATIN SODIUM 20 MG
20 TABLET ORAL
COMMUNITY
Start: 2021-01-20

## 2021-01-21 RX ORDER — OMEGA-3 FATTY ACIDS/FISH OIL 300-1000MG
CAPSULE ORAL
COMMUNITY

## 2021-01-21 NOTE — TELEPHONE ENCOUNTER
Please start prior auth for Orencia 125 mg once weekly (he prefers prefilled syringes if available) for psoriatic arthritis and psoriasis  He has previously tried methotrexate, Humira and Enbrel  He has a normal hepatitis and TB test, thanks

## 2021-02-04 NOTE — TELEPHONE ENCOUNTER
I placed new orders for a hepatitis and TB test   If required please mail this to him and please let him know he needs to have this done as soon as possible for the authorization, thanks

## 2021-02-19 NOTE — TELEPHONE ENCOUNTER
Patient called back yesterday and LM on clinical voicemail asking for Lonza Lady to give him a call back      [phone]

## 2021-02-24 DIAGNOSIS — L40.50 PSORIATIC ARTHRITIS (HCC): ICD-10-CM

## 2021-02-24 RX ORDER — ETANERCEPT 50 MG/ML
50 SOLUTION SUBCUTANEOUS WEEKLY
Qty: 4 ML | OUTPATIENT
Start: 2021-02-24

## 2021-03-29 DIAGNOSIS — L40.50 PSORIATIC ARTHRITIS (HCC): ICD-10-CM

## 2021-03-29 RX ORDER — ETANERCEPT 50 MG/ML
50 SOLUTION SUBCUTANEOUS WEEKLY
Qty: 4 SYRINGE | Refills: 5 | Status: SHIPPED | OUTPATIENT
Start: 2021-03-29 | End: 2021-08-06 | Stop reason: SDUPTHER

## 2021-03-29 NOTE — TELEPHONE ENCOUNTER
Patient is calling to have his Enbrel refilled  Patient would like you to call him as well, he asked for Filiberto Elaine but since she will be out

## 2021-03-30 ENCOUNTER — TELEPHONE (OUTPATIENT)
Dept: OBGYN CLINIC | Facility: CLINIC | Age: 51
End: 2021-03-30

## 2021-03-30 NOTE — TELEPHONE ENCOUNTER
Spoke with patient  I advised him Jony Long is busy with patients and I wanted to take a message for her  He declined and wanted to speak with her  I again advised him we cannot do that, he would need to set up an office visit or virtual visit   He declined and said he doesn't need to discuss it now

## 2021-03-30 NOTE — TELEPHONE ENCOUNTER
----- Message from Narciso Izquierdo MD sent at 3/29/2021  4:27 PM EDT -----  Can you pls check with patient if he ever started the [place] or is he still on Enbrel?

## 2021-03-30 NOTE — TELEPHONE ENCOUNTER
I called patient and LMOM asking below  Advised to call back to let us know  I do have another message in to [facility] to call the patient, since he wanted to speak to her or Dahiana Lopez

## 2021-03-30 NOTE — TELEPHONE ENCOUNTER
Patient called back and advised he wants to speak to you directly concerning Orencia  He wants to start but needs to speak to you directly he said

## 2021-08-03 ENCOUNTER — TELEPHONE (OUTPATIENT)
Dept: RHEUMATOLOGY | Facility: CLINIC | Age: 51
End: 2021-08-03

## 2021-08-03 NOTE — TELEPHONE ENCOUNTER
Left detailed message for patient cancelling appt fo 1/21 due to provider on leave and asked that he call the office to reschedule

## 2021-08-06 ENCOUNTER — TELEPHONE (OUTPATIENT)
Dept: OBGYN CLINIC | Facility: CLINIC | Age: 51
End: 2021-08-06

## 2021-08-06 DIAGNOSIS — L40.50 PSORIATIC ARTHRITIS (HCC): ICD-10-CM

## 2021-08-06 RX ORDER — ETANERCEPT 50 MG/ML
50 SOLUTION SUBCUTANEOUS WEEKLY
Qty: 4 ML | Refills: 5 | Status: SHIPPED | OUTPATIENT
Start: 2021-08-06 | End: 2022-01-14

## 2021-08-06 NOTE — TELEPHONE ENCOUNTER
He never did the Veneela  He was meant to have a TB test and never had it done so it was never approved

## 2021-08-06 NOTE — TELEPHONE ENCOUNTER
Chart notes indicate he was supposed to start orencia but seems like he never did? Can you verify he is not taking both orencia and enbrel before I refill that?

## 2022-02-04 ENCOUNTER — TELEPHONE (OUTPATIENT)
Dept: RHEUMATOLOGY | Facility: CLINIC | Age: 52
End: 2022-02-04

## 2022-02-04 ENCOUNTER — TELEMEDICINE (OUTPATIENT)
Dept: RHEUMATOLOGY | Facility: CLINIC | Age: 52
End: 2022-02-04
Payer: COMMERCIAL

## 2022-02-04 DIAGNOSIS — L40.50 PSORIATIC ARTHROPATHY (HCC): Primary | ICD-10-CM

## 2022-02-04 DIAGNOSIS — Z79.899 LONG-TERM USE OF IMMUNOSUPPRESSANT MEDICATION: ICD-10-CM

## 2022-02-04 DIAGNOSIS — L40.9 PSORIASIS: ICD-10-CM

## 2022-02-04 DIAGNOSIS — M15.0 PRIMARY GENERALIZED (OSTEO)ARTHRITIS: ICD-10-CM

## 2022-02-04 DIAGNOSIS — M47.812 OSTEOARTHRITIS OF CERVICAL SPINE, UNSPECIFIED SPINAL OSTEOARTHRITIS COMPLICATION STATUS: ICD-10-CM

## 2022-02-04 PROCEDURE — 99214 OFFICE O/P EST MOD 30 MIN: CPT | Performed by: INTERNAL MEDICINE

## 2022-02-04 NOTE — TELEPHONE ENCOUNTER
Please start prior auth for Orencia 125 mg once weekly (he prefers prefilled syringes) for psoriatic arthritis and psoriasis  He has previously tried methotrexate, Humira and Enbrel  He has a normal hepatitis and TB test, thanks  Office follow up for 6 months please

## 2022-02-04 NOTE — PROGRESS NOTES
Virtual Brief Visit    Patient is located in the following state in which I hold an active license [place]      Assessment/Plan:    Problem List Items Addressed This Visit     None      Visit Diagnoses     Psoriatic arthropathy ([address] )    -  Primary    Psoriasis        Long-term use of immunosuppressant medication        Primary generalized (osteo)arthritis        Osteoarthritis of cervical spine, unspecified spinal osteoarthritis complication status              Recent Visits  No visits were found meeting these conditions  Showing recent visits within past 7 days and meeting all other requirements  Today's Visits  Date Type Provider Dept   02/04/22 Telemedicine Pb Curtis MD [facility] today's visits and meeting all other requirements  Future Appointments  No visits were found meeting these conditions  Showing future appointments within next 150 days and meeting all other requirements       Assessment and Plan:   Mr Villela is a 12-year-old male with history significant for psoriasis, psoriatic arthritis and obesity, who presents for a follow up of psoriatic arthritis  [facility] is currently on subcutaneous Enbrel 50 mg weekly      # Psoriatic arthritis and psoriasis  - Christopher presents today for a follow-up of psoriatic arthritis which is currently managed with subcutaneous Enbrel 50 mg once weekly and meloxicam 15 mg daily as needed which he takes very sparingly  While he has overall been stable he is questioning if a change in medications may help to improve the ongoing stiffness and also calm down the psoriasis flare  I recommend we try an alternate DMARD and will switch him to subcutaneous Orencia 125 mg once weekly  I reviewed the side effects with him including but not limited to risk for infections    I advised him over the next few months if there is no improvement noted with the Orencia we can discuss changing back to Enbrel     - I advised him to utilize the meloxicam 15 mg once daily as needed      # Cervical degenerative arthritis  - He was found to have mild degenerative arthritis of his cervical spine in 2017, and it is possible that this may have progressed leading to the periodic neck pain and radicular symptoms he has been experiencing   I do suspect that the majority of his neck pain may also be muscular in nature  Nancy Aiken will continue with conservative measures at home for now, but I requested he contact the office if he would like to consider repeat cervical spine imaging or a referral to Pain Management        Plan:  Diagnoses and all orders for this visit:    Psoriatic arthropathy ([address] )    Psoriasis    Long-term use of immunosuppressant medication    Primary generalized (osteo)arthritis    Osteoarthritis of cervical spine, unspecified spinal osteoarthritis complication status      HPI    INITIAL VISIT NOTE:  Mr Villela is a 59-year-old male with history significant for psoriatic arthritis and obesity, who presents for further management of psoriatic arthritis  Nancy Aiken is transitioning care from Dr Mcgill [place] is currently on subcutaneous Enbrel 50 mg weekly      Patient states he was diagnosed with psoriatic arthritis in the mid 1990s, when he presented with severe pain and swelling of his feet   He states the symptoms progressed rapidly over time to the point where he was using crutches for ambulating  Nancy Aiken was seen by multiple specialists including Podiatry and Orthopedics, and treated for plantar fasciitis without any relief   He also underwent an EMG/NCS study of his lower extremities which was unrevealing   Eventually he was referred to a rheumatologist and immediately diagnosed with psoriatic arthritis  Nancy Aiken states initially he was started on an oral DMARD but cannot recall the name  Nancy Aiken states it made him very sun sensitive so it was discontinued   Following this he was started on subcutaneous Enbrel 50 mg weekly which he has been maintained on since then  [facility] has been tolerating the Enbrel well without any noticeable side effects and there have been no concerns for recurrent infections  [facility] states briefly he was switched from Enbrel to Humira for a short period of time, but for various reasons he disliked the Humira and was eventually transitioned back to Margert Bring states the change was made due to concerns for continued inflammation at a few finger joints      Currently he denies any joint pains, and states he does not experience any frequent flare-ups   On occasion the only joints that do bother him is his left hand thumb, index and middle fingers   He states the pain resolves spontaneously   Recently he did have an episode of right knee swelling after prolonged activities and was seen by his orthopedic doctor who thought it was related to the activities he was doing  [facility] was administered an intra-articular cortisone injection which helped him   Since then he has not experienced any joint swelling   He does experience morning stiffness which affects him diffusely but more commonly affects his back, which he states lasts a few minutes  Sally Alu has been no requirement for him to take any over-the-counter pain medications  [facility] has also not had any significant flare ups in the psoriasis  [facility] denies any other complaints at today's visit         4/16/2019:  Patient presents for follow up of psoriatic arthritis  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 which was unchanged since 2013   XR of the right hand showed osteoarthritis at the 1st [address] joint, as well as osteoarthritis of the 2nd and 3rd MCP joints       He has been tolerating the Enbrel well without any concerns for side effects or recurrent infections   He denies fevers, weight loss, new skin rash or mouth/nose ulcers   He has a chronic itchy skin rash on his right lower leg which he states even though is present, seems to be better controlled on the Enbrel   This has previously been attributed to psoriasis   No other skin rashes noted      His joint pains have overall been well controlled, although intermittently he will experience pain affecting his hands (predominantly the left thumb) and more recently his right elbow   The right elbow pain has been aggravated with activities and he does endorse frequent overuse 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   The symptoms do not occur everyday   No other significant joint pains noted   No swelling noted   He reports morning stiffness which affects him diffusely and lasts a few minutes          10/3/2019:  Patient presents for a follow-up of psoriatic arthritis  [facility] is currently on subcutaneous Enbrel 50 milligram weekly      Patient reports he is currently doing well, and denies any joint pains, swelling or significant morning stiffness   He does experience some mild diffuse stiffness when he first wakes up that improves after he takes a hot shower   He did take the meloxicam as needed for the right elbow epicondylitis, and states that his symptoms significantly improved   He was experiencing a flare-up of pain affecting his right knee and was seen by Orthopedics and underwent an MRI   I do not have the reports available to me, but he was advised that there may be a very minor hairline fracture   Surgical options were discussed with him, but patient opted not to pursue this during his work season   Currently he is being managed with meloxicam 15 milligram daily as needed, and states that this has significantly been helping with the right knee and back pain   He is doing well at this time  Mis Enamorado has been tolerating the Enbrel well without any concerns for side effects or infections         1/27/2020:  Patient presents for a follow-up of psoriatic arthritis  [facility] is currently on subcutaneous Enbrel 50 mg once weekly   I reviewed his recently done CBC and CMP which were unremarkable      Patient reports he has not had any significant flare-up in his joint pains since the last office visit  [facility] continues to experience occasional pain and swelling at his bilateral thumbs, which is not new, and is slightly improved from the last office visit  Jose Gonzalez have been no new joint pains, swelling or stiffness   Recently he has been experiencing periodic flare-ups of neck pain associated with stiffness   He is known to have mild degenerative arthritis of his cervical spine   He was seen by his primary care physician and prescribed a Medrol Dosepak which did help  [facility] mentions he will continue with conservative measures at home to manage his neck pain, and contact the office if he would like repeat evaluation with neck imaging or referral to the Spine [facility]      He has been tolerating the Enbrel well without any concerns for side effects or recurrent infections   No new complaints noted today         1/21/2021:  Patient presents for a follow-up of psoriatic arthritis  He is currently on subcutaneous Enbrel 50 mg once weekly  I reviewed his recently done CBC, CMP, ESR and CRP which were unremarkable  The lipid panel showed a mildly elevated LDL of 138      He reports over the past few months he feels like his joint pains have been flaring up  He particularly experienced symptoms affecting his bilateral knees for which he followed with Orthopedics  He had intra-articular cortisone injections administered which did not help  He completed a series of viscosupplementation which has been helping  There are plans to delay surgery as much as possible for the knee osteoarthritis  He reports intermittent pain that still affect his left thumb    He was also seeing Podiatry for pain on the anterior and lateral aspect of his right foot with concerns for a stress fracture  He reports occasional pain that also affect his bilateral shoulders  He denies any joint pains throughout his hands otherwise, wrists, elbows, hips, ankles or left foot  He denies swollen joints  He does experience morning stiffness which affects him diffusely and takes about 1 hour to improve  He has been utilizing the meloxicam very sparingly and has not been on any recent steroids  He reports the psoriasis has been stable but occasionally the lesion on his right lower leg may flare-up and he may notice small patches on other areas of his body      He has been tolerating the Enbrel well without any concerns for side effects or infections  2/4/2022:  Patient presents for a follow-up of psoriatic arthritis and psoriasis  He is currently on subcutaneous Enbrel 50 mg once weekly  He reports overall he has been stable without any significant flare ups  He still feels stiff in the morning and states on occasion this can last more than 1 hour  No worsening joint pains or swelling  He has had a flare up in the psoriasis  He has been tolerating the Enbrel well without any concerns for side effects or infections  The following portions of the patient's history were reviewed and updated as appropriate: allergies, current medications, past family history, past medical history, past social history, past surgical history and problem list       Review of Systems  Constitutional: Negative for weight change, fevers, chills, night sweats, fatigue  ENT/Mouth: Negative for hearing changes, ear pain, nasal congestion, sinus pain, hoarseness, sore throat, rhinorrhea, swallowing difficulty  Eyes: Negative for pain, redness, discharge, vision changes  Cardiovascular: Negative for chest pain, SOB, palpitations  Respiratory: Negative for cough, sputum, wheezing, dyspnea  Gastrointestinal: Negative for nausea, vomiting, diarrhea, constipation, pain, heartburn  Genitourinary: Negative for dysuria, urinary frequency, hematuria  Musculoskeletal: As per HPI  Skin: Negative for color changes  Positive for skin rash  Neuro: Negative for weakness, numbness, tingling, loss of consciousness  Psych: Negative for anxiety, depression  Heme/Lymph: Negative for easy bruising, bleeding, lymphadenopathy          Past Medical History:   Diagnosis Date    GERD (gastroesophageal reflux disease)     Psoriatic [address] [address] )        Past Surgical History:   Procedure Laterality Date    HIP SURGERY         Social History     Socioeconomic History    Marital status: /Civil Union     Spouse name: Not on file    Number of children: Not on file    Years of education: Not on file    Highest education level: Not on file   Occupational History    Not on file   Tobacco Use    Smoking status: Never Smoker    Smokeless tobacco: Never Used   Substance and Sexual Activity    Alcohol use: No    Drug use: No    Sexual activity: Not on file   Other Topics Concern    Not on file   Social History Narrative    Not on file     Social Determinants of Health     Financial Resource Strain: Not on file   Food Insecurity: Not on file   Transportation Needs: Not on file   Physical Activity: Not on file   Stress: Not on file   Social Connections: Not on file   Intimate Partner Violence: Not on file   Housing Stability: Not on file       Family History   Problem Relation Age of Onset    No Known Problems Mother     No Known Problems Father     No Known Problems Sister     No Known Problems Brother     No Known Problems Daughter     No Known Problems Maternal Grandmother     No Known Problems Maternal Grandfather     No Known Problems Paternal Grandmother     No Known Problems Paternal Grandfather     No Known Problems Maternal Aunt     No Known Problems Maternal Uncle     No Known Problems Paternal Aunt     No Known Problems Paternal Uncle     No Known Problems Cousin     Alcohol abuse Neg Hx     Arthritis Neg Hx     Dementia Neg Hx     Cancer Neg Hx     COPD Neg Hx     Depression Neg Hx     Lupus Neg Hx     Drug abuse Neg Hx     Early death Neg Hx     Hearing loss Neg Hx     Hyperlipidemia Neg Hx     Learning disabilities Neg Hx     Substance Abuse Neg Hx     Stroke Neg Hx     Vision loss Neg Hx     Pulmonary embolism Neg Hx     Deep vein thrombosis Neg Hx     Mental illness Neg Hx     Crohn's disease Neg Hx     Rheum arthritis Neg Hx     Psoriasis Neg Hx     Asthma Neg Hx        No Known Allergies      Current Outpatient Medications:     baclofen 10 mg tablet, take 1 tablet by mouth at bedtime for 14 days, Disp: , Rfl:     Enbrel 50 MG/ML SOSY, Inject 1 mL (50 mg) under the skin once a week, Disp: 4 mL, Rfl: 3    esomeprazole (NexIUM) 40 MG capsule, Take 40 mg by mouth every morning before breakfast, Disp: , Rfl:     ETANERCEPT SC, Inject under the skin, Disp: , Rfl:     Ibuprofen 200 MG CAPS, Ibuprofen 200 MG Oral Capsule TAKE CAPSULE  PRN as needed   Refills: 0     Active, Disp: , Rfl:     lisinopril (ZESTRIL) 5 mg tablet, , Disp: , Rfl: 0    lisinopril (ZESTRIL) 5 mg tablet, Take 5 mg by mouth daily, Disp: , Rfl:     meloxicam (MOBIC) 15 mg tablet, Take 1 tablet (15 mg total) by mouth daily as needed for moderate pain, Disp: 30 tablet, Rfl: 0    methylPREDNISolone 4 MG tablet therapy pack, TAKE AS DIRECTED ON INSIDE OF PACKAGE, Disp: , Rfl:     polyethylene glycol-electrolytes (NULYTELY) 4000 mL solution, Take by mouth Take as directed, Disp: , Rfl:     pravastatin (PRAVACHOL) 20 mg tablet, Take 20 mg by mouth, Disp: , Rfl:     RA LAXATIVE 5 MG EC tablet, take 2 tablets by mouth for COLONOSCOPY PREP-SEE INSTRUCTION SHEET, Disp: , Rfl:     tamsulosin (FLOMAX) 0 4 mg, Take 0 4 mg by mouth daily, Disp: , Rfl:       Jesus Chung, SOBEIDA Ly      I spent 21 minutes directly with the patient during this visit

## 2022-02-10 RX ORDER — ABATACEPT 125 MG/ML
125 INJECTION, SOLUTION SUBCUTANEOUS WEEKLY
Qty: 4 ML | Refills: 5 | Status: SHIPPED | OUTPATIENT
Start: 2022-02-10 | End: 2022-02-16

## 2022-02-16 ENCOUNTER — TELEPHONE (OUTPATIENT)
Dept: OBGYN CLINIC | Facility: HOSPITAL | Age: 52
End: 2022-02-16

## 2022-02-16 DIAGNOSIS — L40.50 PSORIATIC ARTHRITIS (HCC): Primary | ICD-10-CM

## 2022-02-16 RX ORDER — ABATACEPT 125 MG/ML
125 INJECTION, SOLUTION SUBCUTANEOUS WEEKLY
Qty: 4 ML | Refills: 5 | Status: SHIPPED | OUTPATIENT
Start: 2022-02-16 | End: 2022-03-29

## 2022-02-16 NOTE — TELEPHONE ENCOUNTER
Patient called stating he wanted prefilled syringes not the Clickject  Also he was taking 50 mg of the Embril and wants to make sure the 125 mg is now correct   Please return his call asap, thanks    Abatacept (Orencia ClickJect) 670 MG/ML Yadira Moy [[id number]]     Order Details  Dose: 125 mg Route: Subcutaneous Frequency: Weekly   Dispense Quantity: 4 mL Refills: 5          Sig: Inject 1 mL (125 mg total) under the skin once a week

## 2022-02-16 NOTE — TELEPHONE ENCOUNTER
Please let him now I placed a new order for the Cody Mcclelland, he can call the pharmacy to check if these are prefilled syringes  Yes the dose is correct

## 2022-02-18 NOTE — TELEPHONE ENCOUNTER
Advised patient of below as he did question the 125mg dose  Patient not satisfied with this  He wasn't to know the reason for the dosage

## 2022-02-18 NOTE — TELEPHONE ENCOUNTER
I do not understand his concern  This is a new medication and the dose for each medication is different  There is no 50 mg dose for the Orencia

## 2022-03-28 ENCOUNTER — TELEPHONE (OUTPATIENT)
Dept: OBGYN CLINIC | Facility: CLINIC | Age: 52
End: 2022-03-28

## 2022-03-28 DIAGNOSIS — L40.50 PSORIATIC ARTHRITIS (HCC): Primary | ICD-10-CM

## 2022-03-28 NOTE — TELEPHONE ENCOUNTER
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

## 2022-03-28 NOTE — TELEPHONE ENCOUNTER
Patient called back, he would like to switch back to the Enbrel  He is asking if Dr Yanira Santacruz thinks the pain in his neck was from the arthritis? Was the Enbrel controlling the pain? Patient prefers [facility] as the pharmacy        # [phone]

## 2022-03-28 NOTE — TELEPHONE ENCOUNTER
Please check if he wants to switch back to the Enbrel or if he wants to try a new biologic medication, thanks

## 2022-03-29 RX ORDER — ETANERCEPT 50 MG/ML
50 SOLUTION SUBCUTANEOUS WEEKLY
Qty: 4 ML | Refills: 5 | Status: SHIPPED | OUTPATIENT
Start: 2022-03-29 | End: 2022-04-20 | Stop reason: SDUPTHER

## 2022-03-29 NOTE — TELEPHONE ENCOUNTER
Pt has pain in his neck and is thinking the Dorys Sutton is not helping  He doesn't think its working for his psoriasis either  He already went back on Enbrel yesterday and has 2 more injections at home so can we send a new RX and they will let us know if it needs approval, thanks

## 2022-03-29 NOTE — TELEPHONE ENCOUNTER
Patient upset that he's not getting a cb from the office  He has questions for Dr Kannan Perez or Benton Alvarado  CB # [id number]    Patient has questions about going back on Enbrel

## 2022-04-07 ENCOUNTER — TELEPHONE (OUTPATIENT)
Dept: OBGYN CLINIC | Facility: HOSPITAL | Age: 52
End: 2022-04-07

## 2022-04-07 NOTE — TELEPHONE ENCOUNTER
[facility] calls in [facility] and is now switching back to Enbrel  He said he received a letter from [facility] that his claim for Enbrel will not be processed because the office did not provide additional clinical information  He states he has been on the medication (Enbrel) for 20 years  He only has one more dose left  Please call patient       cb # [phone]

## 2022-04-12 NOTE — TELEPHONE ENCOUNTER
Yasmeen Swanson  from [facility] called, they said the info was faxed to the office, they were asking if this was received and sent back? And what fax # was it sent back to? Correct fax # is [facility] [employer] [phone] or [phone]    Please send to both along with chart notes        # [id number]

## 2022-04-12 NOTE — TELEPHONE ENCOUNTER
Spoke with [facility] and clarified fax was sent back they did not receive it so they will send a new form

## 2022-04-12 NOTE — TELEPHONE ENCOUNTER
Patient called in , he just wanted to make sure that [address]   I advised of Isabella's message  Can we just call patient with any update       C/b # [phone]

## 2022-04-20 RX ORDER — ETANERCEPT 50 MG/ML
50 SOLUTION SUBCUTANEOUS WEEKLY
Qty: 4 ML | Refills: 5 | Status: SHIPPED | OUTPATIENT
Start: 2022-04-20

## 2022-04-20 NOTE — TELEPHONE ENCOUNTER
Enbrel has been approved until 4/18/2023  Please send new RX to [facility] Harrison on file  I let patient know that it was approved  Thanks

## 2022-08-30 DIAGNOSIS — K21.9 GASTROESOPHAGEAL REFLUX DISEASE WITHOUT ESOPHAGITIS: Primary | ICD-10-CM

## 2022-08-30 RX ORDER — ESOMEPRAZOLE MAGNESIUM 40 MG/1
40 CAPSULE, DELAYED RELEASE ORAL DAILY
Qty: 90 CAPSULE | Refills: 1 | Status: SHIPPED | OUTPATIENT
Start: 2022-08-30 | End: 2022-11-28

## 2022-09-27 DIAGNOSIS — L40.50 PSORIATIC ARTHRITIS (HCC): ICD-10-CM

## 2022-09-27 RX ORDER — ETANERCEPT 50 MG/ML
50 SOLUTION SUBCUTANEOUS WEEKLY
Qty: 4 ML | Refills: 5 | Status: SHIPPED | OUTPATIENT
Start: 2022-09-27

## 2022-10-27 ENCOUNTER — TELEMEDICINE (OUTPATIENT)
Dept: RHEUMATOLOGY | Facility: CLINIC | Age: 52
End: 2022-10-27
Payer: COMMERCIAL

## 2022-10-27 ENCOUNTER — TELEPHONE (OUTPATIENT)
Dept: RHEUMATOLOGY | Facility: CLINIC | Age: 52
End: 2022-10-27

## 2022-10-27 DIAGNOSIS — Z79.60 LONG-TERM USE OF IMMUNOSUPPRESSANT MEDICATION: ICD-10-CM

## 2022-10-27 DIAGNOSIS — M15.0 PRIMARY GENERALIZED (OSTEO)ARTHRITIS: ICD-10-CM

## 2022-10-27 DIAGNOSIS — L40.50 PSORIATIC ARTHROPATHY (HCC): Primary | ICD-10-CM

## 2022-10-27 DIAGNOSIS — L40.9 PSORIASIS: ICD-10-CM

## 2022-10-27 DIAGNOSIS — M47.812 OSTEOARTHRITIS OF CERVICAL SPINE, UNSPECIFIED SPINAL OSTEOARTHRITIS COMPLICATION STATUS: ICD-10-CM

## 2022-10-27 PROCEDURE — 99215 OFFICE O/P EST HI 40 MIN: CPT | Performed by: INTERNAL MEDICINE

## 2022-10-27 RX ORDER — MELOXICAM 15 MG/1
15 TABLET ORAL DAILY PRN
Qty: 90 TABLET | Refills: 0 | Status: SHIPPED | OUTPATIENT
Start: 2022-10-27

## 2022-10-27 NOTE — PROGRESS NOTES
Virtual Regular Visit    Verification of patient location:    Patient is located in the following state in which I hold an active license [place]      Assessment/Plan:    Problem List Items Addressed This Visit    None     Visit Diagnoses     Psoriatic arthropathy ([address] )    -  Primary    Relevant Medications    meloxicam (MOBIC) 15 mg tablet    Psoriasis        Relevant Medications    meloxicam (MOBIC) 15 mg tablet    Long-term use of immunosuppressant medication        Relevant Orders    CBC and differential    Comprehensive metabolic panel    C-reactive protein    Sedimentation rate, automated    Lipid Panel with Direct LDL reflex    Primary generalized (osteo)arthritis        Osteoarthritis of cervical spine, unspecified spinal osteoarthritis complication status                   Reason for visit is follow up  Chief Complaint   Patient presents with   • Virtual Regular Visit        Encounter provider Lincoln Gaines MD    Provider located at [address] [place] [id number] [phone]      Recent Visits  No visits were found meeting these conditions  Showing recent visits within past 7 days and meeting all other requirements  Today's Visits  Date Type Provider Dept   10/27/22 Telemedicine Lincoln Gaines MD [facility] today's visits and meeting all other requirements  Future Appointments  No visits were found meeting these conditions  Showing future appointments within next 150 days and meeting all other requirements       The patient was identified by name and date of birth  Carmel Munguia was informed that this is a telemedicine visit and that the visit is being conducted through Telephone  My office door was closed  No one else was in the room  He acknowledged consent and understanding of privacy and security of the video platform   The patient has agreed to participate and understands they can discontinue the visit at any time  Patient is aware this is a billable service         Subjective    HPI     INITIAL VISIT NOTE:  Mr Villela is a 53-year-old male with history significant for psoriatic arthritis and obesity, who presents for further management of psoriatic arthritis  Josuedixon Nayak is transitioning care from Dr Kassie Allred is currently on subcutaneous Enbrel 50 mg weekly      Patient states he was diagnosed with psoriatic arthritis in the mid 1990s, when he presented with severe pain and swelling of his feet   He states the symptoms progressed rapidly over time to the point where he was using crutches for ambulating  Cathleen Nayak was seen by multiple specialists including Podiatry and Orthopedics, and treated for plantar fasciitis without any relief   He also underwent an EMG/NCS study of his lower extremities which was unrevealing   Eventually he was referred to a rheumatologist and immediately diagnosed with psoriatic arthritis  Cathleen Nancyel states initially he was started on an oral DMARD but cannot recall the name  Cathleen Nayak states it made him very sun sensitive so it was discontinued   Following this he was started on subcutaneous Enbrel 50 mg weekly which he has been maintained on since then  Cathleen Nayak has been tolerating the Enbrel well without any noticeable side effects and there have been no concerns for recurrent infections  Cathleen Nayak states briefly he was switched from Enbrel to Humira for a short period of time, but for various reasons he disliked the Humira and was eventually transitioned back to Velna Cola states the change was made due to concerns for continued inflammation at a few finger joints      Currently he denies any joint pains, and states he does not experience any frequent flare-ups   On occasion the only joints that do bother him is his left hand thumb, index and middle fingers   He states the pain resolves spontaneously   Recently he did have an episode of right knee swelling after prolonged activities and was seen by his orthopedic doctor who thought it was related to the activities he was doing  Carolyn Shrestha was administered an intra-articular cortisone injection which helped him   Since then he has not experienced any joint swelling  Carolyn Shrestha does experience morning stiffness which affects him diffusely but more commonly affects his back, which he states lasts a few minutes  [place] Fling has been no requirement for him to take any over-the-counter pain medications  Carolyn Shrestha has also not had any significant flare ups in the psoriasis  Carolyn Shrestha denies any other complaints at today's visit         4/16/2019:  Patient presents for follow up of psoriatic arthritis  Carolyn Shrestha is currently on subcutaneous Enbrel 50 mg weekly   We reviewed the tests done following his prior office visit which showed an unremarkable CBC, CMP, ESR, chronic hepatitis panel, QuantiFERON, rheumatoid factor, anti-CCP antibody and uric acid   CRP was mildly elevated at 5 9   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 which was unchanged since 2013   XR of the right hand showed osteoarthritis at the 1st [facility] joint, as well as osteoarthritis of the 2nd and 3rd MCP joints       He has been tolerating the Enbrel well without any concerns for side effects or recurrent infections   He denies fevers, weight loss, new skin rash or mouth/nose ulcers   He has a chronic itchy skin rash on his right lower leg which he states even though is present, seems to be better controlled on the Enbrel   This has previously been attributed to psoriasis   No other skin rashes noted      His joint pains have overall been well controlled, although intermittently he will experience pain affecting his hands (predominantly the left thumb) and more recently his right elbow   The right elbow pain has been aggravated with activities and he does endorse frequent overuse 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  Brooklynn Karen symptoms do not occur everyday   No other significant joint pains noted   No swelling noted   He reports morning stiffness which affects him diffusely and lasts a few minutes          10/3/2019:  Patient presents for a follow-up of psoriatic arthritis  [facility] is currently on subcutaneous Enbrel 50 milligram weekly      Patient reports he is currently doing well, and denies any joint pains, swelling or significant morning stiffness   He does experience some mild diffuse stiffness when he first wakes up that improves after he takes a hot shower   He did take the meloxicam as needed for the right elbow epicondylitis, and states that his symptoms significantly improved   He was experiencing a flare-up of pain affecting his right knee and was seen by Orthopedics and underwent an MRI   I do not have the reports available to me, but he was advised that there may be a very minor hairline fracture   Surgical options were discussed with him, but patient opted not to pursue this during his work season   Currently he is being managed with meloxicam 15 milligram daily as needed, and states that this has significantly been helping with the right knee and back pain  [facility] is doing well at this time  [facility] has been tolerating the Enbrel well without any concerns for side effects or infections         1/27/2020:  Patient presents for a follow-up of psoriatic arthritis  [facility] is currently on subcutaneous Enbrel 50 mg once weekly   I reviewed his recently done CBC and CMP which were unremarkable      Patient reports he has not had any significant flare-up in his joint pains since the last office visit  [facility] continues to experience occasional pain and swelling at his bilateral thumbs, which is not new, and is slightly improved from the last office visit  Fawad Ok have been no new joint pains, swelling or stiffness   Recently he has been experiencing periodic flare-ups of neck pain associated with stiffness   He is known to have mild degenerative arthritis of his cervical spine   He was seen by his primary care physician and prescribed a Medrol Dosepak which did help  Carolyn Shrestha mentions he will continue with conservative measures at home to manage his neck pain, and contact the office if he would like repeat evaluation with neck imaging or referral to the [facility]      He has been tolerating the Enbrel well without any concerns for side effects or recurrent infections   No new complaints noted today         1/21/2021:  Patient presents for a follow-up of psoriatic arthritis  Carolyn Shrestha is currently on subcutaneous Enbrel 50 mg once weekly   I reviewed his recently done CBC, CMP, ESR and CRP which were unremarkable   The lipid panel showed a mildly elevated LDL of 138      He reports over the past few months he feels like his joint pains have been flaring up   He particularly experienced symptoms affecting his bilateral knees for which he followed with [facility] [address] had intra-articular cortisone injections administered which did not help   He completed a series of viscosupplementation which has been helping   There are plans to delay surgery as much as possible for the knee osteoarthritis   He reports intermittent pain that still affect his left thumb   He was also seeing Podiatry for pain on the anterior and lateral aspect of his right foot with concerns for a stress fracture   He reports occasional pain that also affect his bilateral shoulders   He denies any joint pains throughout his hands otherwise, wrists, elbows, hips, ankles or left foot   He denies swollen joints   He does experience morning stiffness which affects him diffusely and takes about 1 hour to improve   He has been utilizing the meloxicam very sparingly and has not been on any recent steroids   He reports the psoriasis has been stable but occasionally the lesion on his right lower leg may flare-up and he may notice small patches on other areas of his body      He has been tolerating the Enbrel well without any concerns for side effects or infections         2/4/2022:  Patient presents for a follow-up of psoriatic arthritis and psoriasis  [facility]e is currently on subcutaneous Enbrel 50 mg once weekly      He reports overall he has been stable without any significant flare ups  He still feels stiff in the morning and states on occasion this can last more than 1 hour  No worsening joint pains or swelling  He has had a flare up in the psoriasis        He has been tolerating the Enbrel well without any concerns for side effects or infections  10/27/2022:  Patient presents for a follow-up of psoriatic arthritis and psoriasis  Krystal Kaye is currently on subcutaneous Enbrel 50 mg once weekly      He reports overall he has been stable without any significant flare ups  He still feels stiff in the morning and states on occasion this can last more than 1 hour  No worsening joint pains or swelling  He takes meloxicam 15 mg once a day as needed which helps him and this has been infrequently  He has a psoriasis patch on his leg but no new patches      He has been tolerating the Enbrel well without any concerns for side effects or infections        Past Medical History:   Diagnosis Date   • GERD (gastroesophageal reflux disease)    • Psoriatic arthritis ([facility] [address] )        Past Surgical History:   Procedure Laterality Date   • HIP SURGERY         Current Outpatient Medications   Medication Sig Dispense Refill   • meloxicam (MOBIC) 15 mg tablet Take 1 tablet (15 mg total) by mouth daily as needed for moderate pain 90 tablet 0   • baclofen 10 mg tablet take 1 tablet by mouth at bedtime for 14 days     • Enbrel 50 MG/ML SOSY Inject 1 mL (50 mg total) under the skin once a week 4 mL 5   • esomeprazole (NexIUM) 40 MG capsule Take 1 capsule (40 mg total) by mouth daily 90 capsule 1   • Ibuprofen 200 MG CAPS Ibuprofen 200 MG Oral Capsule  TAKE CAPSULE  PRN as needed    Refills: 0       Active     • lisinopril (ZESTRIL) 5 mg tablet   0 • lisinopril (ZESTRIL) 5 mg tablet Take 5 mg by mouth daily     • polyethylene glycol-electrolytes (NULYTELY) 4000 mL solution Take by mouth Take as directed     • pravastatin (PRAVACHOL) 20 mg tablet Take 20 mg by mouth     • RA LAXATIVE 5 MG EC tablet take 2 tablets by mouth for COLONOSCOPY PREP-SEE INSTRUCTION SHEET     • tamsulosin (FLOMAX) 0 4 mg Take 0 4 mg by mouth daily       No current facility-administered medications for this visit  No Known Allergies      Review of Systems  Constitutional: Negative for weight change, fevers, chills, night sweats, fatigue  ENT/Mouth: Negative for hearing changes, ear pain, nasal congestion, sinus pain, hoarseness, sore throat, rhinorrhea, swallowing difficulty  Eyes: Negative for pain, redness, discharge, vision changes  Cardiovascular: Negative for chest pain, SOB, palpitations  Respiratory: Negative for cough, sputum, wheezing, dyspnea  Gastrointestinal: Negative for nausea, vomiting, diarrhea, constipation, pain, heartburn  Genitourinary: Negative for dysuria, urinary frequency, hematuria  Musculoskeletal: As per HPI  Skin: Negative for color changes  Positive for skin rash  Neuro: Negative for weakness, numbness, tingling, loss of consciousness  Psych: Negative for anxiety, depression  Heme/Lymph: Negative for easy bruising, bleeding, lymphadenopathy  Assessment and Plan:   Mr Villela is a 57-year-old male with history significant for psoriasis and psoriatic arthritis who presents for a follow up  Ari Curtis is currently on subcutaneous Enbrel 50 mg weekly      # Psoriatic arthritis and psoriasis  - Christopher presents today for a follow-up of psoriatic arthritis and psoriasis which is currently managed with subcutaneous Enbrel 50 mg once weekly and meloxicam 15 mg daily as needed which he takes very sparingly  He seems to be stable overall without any significant complaints and as a result I recommend continuing the same regimen unchanged    He will update high-risk medication lab monitoring in the winter      # Cervical degenerative arthritis  - He was found to have mild degenerative arthritis of his cervical spine in 2017 and it is possible that this may have progressed leading to the periodic neck pain and radicular symptoms he has been experiencing   I do suspect that the majority of his neck pain may also be muscular in nature  Kymberly Early will continue with conservative measures at home for now, but I requested he contact the office if he would like to consider repeat cervical spine imaging or a referral to Pain Management  I spent 15 minutes directly with the patient during this visit

## 2023-01-11 ENCOUNTER — TELEPHONE (OUTPATIENT)
Dept: OBGYN CLINIC | Facility: MEDICAL CENTER | Age: 53
End: 2023-01-11

## 2023-01-11 NOTE — TELEPHONE ENCOUNTER
Caller: Patient    Doctor: Alyson Baugh    Reason for call:     Patient is calling asking what blood work does the patient needs for 10/30/23, he is going to cardiologist for blood work on Tuesday, and would like to know what is needed for his appointment and if this can be done in advance of appointment?     Call back#: [phone]

## 2023-01-20 LAB
ALBUMIN SERPL-MCNC: 4.7 G/DL (ref 3.8–4.9)
ALBUMIN/GLOB SERPL: 1.7 {RATIO} (ref 1.2–2.2)
ALP SERPL-CCNC: 45 IU/L (ref 44–121)
ALT SERPL-CCNC: 49 IU/L (ref 0–44)
AST SERPL-CCNC: 44 IU/L (ref 0–40)
BASOPHILS # BLD AUTO: 0 X10E3/UL (ref 0–0.2)
BASOPHILS NFR BLD AUTO: 1 %
BILIRUB SERPL-MCNC: 0.5 MG/DL (ref 0–1.2)
BUN SERPL-MCNC: 13 MG/DL (ref 6–24)
BUN/CREAT SERPL: 14 (ref 9–20)
CALCIUM SERPL-MCNC: 9.5 MG/DL (ref 8.7–10.2)
CHLORIDE SERPL-SCNC: 100 MMOL/L (ref 96–106)
CHOLEST SERPL-MCNC: 212 MG/DL (ref 100–199)
CO2 SERPL-SCNC: 21 MMOL/L (ref 20–29)
CREAT SERPL-MCNC: 0.9 MG/DL (ref 0.76–1.27)
CRP SERPL-MCNC: 4 MG/L (ref 0–10)
EGFR: 101 ML/MIN/1.73
EOSINOPHIL # BLD AUTO: 0.2 X10E3/UL (ref 0–0.4)
EOSINOPHIL NFR BLD AUTO: 3 %
ERYTHROCYTE [DISTWIDTH] IN BLOOD BY AUTOMATED COUNT: 13.6 % (ref 11.6–15.4)
ERYTHROCYTE [SEDIMENTATION RATE] IN BLOOD BY WESTERGREN METHOD: 43 MM/HR (ref 0–30)
GLOBULIN SER-MCNC: 2.7 G/DL (ref 1.5–4.5)
GLUCOSE SERPL-MCNC: 106 MG/DL (ref 70–99)
HCT VFR BLD AUTO: 42.5 % (ref 37.5–51)
HDLC SERPL-MCNC: 57 MG/DL
HGB BLD-MCNC: 14.4 G/DL (ref 13–17.7)
IMM GRANULOCYTES # BLD: 0 X10E3/UL (ref 0–0.1)
IMM GRANULOCYTES NFR BLD: 0 %
LDLC SERPL CALC-MCNC: 133 MG/DL (ref 0–99)
LYMPHOCYTES # BLD AUTO: 1.8 X10E3/UL (ref 0.7–3.1)
LYMPHOCYTES NFR BLD AUTO: 34 %
MCH RBC QN AUTO: 30.1 PG (ref 26.6–33)
MCHC RBC AUTO-ENTMCNC: 33.9 G/DL (ref 31.5–35.7)
MCV RBC AUTO: 89 FL (ref 79–97)
MONOCYTES # BLD AUTO: 0.5 X10E3/UL (ref 0.1–0.9)
MONOCYTES NFR BLD AUTO: 10 %
NEUTROPHILS # BLD AUTO: 2.8 X10E3/UL (ref 1.4–7)
NEUTROPHILS NFR BLD AUTO: 52 %
PLATELET # BLD AUTO: 230 X10E3/UL (ref 150–450)
POTASSIUM SERPL-SCNC: 4.5 MMOL/L (ref 3.5–5.2)
PROT SERPL-MCNC: 7.4 G/DL (ref 6–8.5)
RBC # BLD AUTO: 4.79 X10E6/UL (ref 4.14–5.8)
SODIUM SERPL-SCNC: 136 MMOL/L (ref 134–144)
TRIGL SERPL-MCNC: 126 MG/DL (ref 0–149)
WBC # BLD AUTO: 5.3 X10E3/UL (ref 3.4–10.8)

## 2023-02-14 DIAGNOSIS — K21.9 GASTROESOPHAGEAL REFLUX DISEASE WITHOUT ESOPHAGITIS: ICD-10-CM

## 2023-02-14 RX ORDER — ESOMEPRAZOLE MAGNESIUM 40 MG/1
40 CAPSULE, DELAYED RELEASE ORAL DAILY
Qty: 90 CAPSULE | Refills: 1 | Status: SHIPPED | OUTPATIENT
Start: 2023-02-14 | End: 2023-05-15

## 2023-02-24 ENCOUNTER — RA CDI HCC (OUTPATIENT)
Dept: OTHER | Facility: HOSPITAL | Age: 53
End: 2023-02-24

## 2023-02-24 NOTE — PROGRESS NOTES
[facility]  coding opportunities       Chart reviewed, no opportunity found: CHART REVIEWED, NO OPPORTUNITY FOUND        Patients Insurance        Commercial Insurance: Rj Rosales

## 2023-03-02 ENCOUNTER — OFFICE VISIT (OUTPATIENT)
Dept: AUDIOLOGY | Facility: CLINIC | Age: 53
End: 2023-03-02

## 2023-03-02 ENCOUNTER — OFFICE VISIT (OUTPATIENT)
Dept: OTOLARYNGOLOGY | Facility: CLINIC | Age: 53
End: 2023-03-02

## 2023-03-02 VITALS — BODY MASS INDEX: 40.73 KG/M2 | TEMPERATURE: 97.6 F | HEIGHT: 69 IN | WEIGHT: 275 LBS

## 2023-03-02 DIAGNOSIS — H90.3 SENSORINEURAL HEARING LOSS (SNHL) OF BOTH EARS: Primary | ICD-10-CM

## 2023-03-02 DIAGNOSIS — H90.3 SENSORY HEARING LOSS, BILATERAL: Primary | ICD-10-CM

## 2023-03-02 RX ORDER — ERGOCALCIFEROL 1.25 MG/1
50000 CAPSULE ORAL
COMMUNITY
Start: 2023-01-23 | End: 2023-04-17

## 2023-03-02 RX ORDER — EVOLOCUMAB 140 MG/ML
140 INJECTION, SOLUTION SUBCUTANEOUS
COMMUNITY
Start: 2023-02-01

## 2023-03-02 RX ORDER — EZETIMIBE 10 MG/1
10 TABLET ORAL DAILY
COMMUNITY
Start: 2023-01-24 | End: 2024-01-24

## 2023-03-02 RX ORDER — ROSUVASTATIN CALCIUM 10 MG/1
10 TABLET, COATED ORAL
COMMUNITY
Start: 2022-07-21 | End: 2023-03-03

## 2023-03-02 NOTE — PROGRESS NOTES
ENT HEARING EVALUATION    Name:  Jefry Suarez [facility])  :  1970  Age:  45 y o  Date of Evaluation: 23     History: ENT Audiogram  Reason for visit: Jefry Suarez is being seen today at the request of Ms  Da THRASHER for an evaluation of hearing as part of their initial ENT visit  He is reporting difficulties hearing females in conversation  Tinnitus is intermittent and low level  EVALUATION:    Otoscopic Evaluation:   Right Ear: Clear and healthy ear canal and tympanic membrane   Left Ear: Clear and healthy ear canal and tympanic membrane    Tympanometry:   Right: Type A - normal middle ear pressure and compliance   Left: Type A - normal middle ear pressure and compliance    Audiogram Results: Within or bordering normal limits, 250-2000 Hz, (slightly greater in the left ear), sloping to a mild to severe degree, 8514-2142 Hz  *see attached audiogram    RECOMMENDATIONS:  1  Follow up per ANNA MARIE Colón  2  Annual audiological evaluations for monitoring purposes   3   Hearing protection when needed while hunting or using power equipment    Umu Huerta , CCC-A, [place]# [id number]  Clinical Audiologist

## 2023-03-02 NOTE — PROGRESS NOTES
Assessment/Plan:    Sensorineural hearing loss (SNHL) of both ears  On exam, minimal cerumen on the right removed with suction, one loose hair removed on left with alligator forceps  No procedure  Audiogram completed today indicated bilateral high frequency SNHL  Tymps Type A bilaterally    Offered options for bilateral SNHL including acceptance, lifestyle changes such as moving closer to those who are speaking, or hearing amplification  He would qualify for hearing amplification based on audiogram   Discussed hearing aid options including facilities to obtain a hearing aid from as well as costs and benefits  Discussed that quality of life may improve with hearing amplification  Pt will follow up with our office annually                Diagnoses and all orders for this visit:    Sensorineural hearing loss (SNHL) of both ears  -     Ambulatory referral to Audiology    Other orders  -     Discontinue: rosuvastatin (CRESTOR) 10 MG tablet; Take 10 mg by mouth (Patient not taking: Reported on 3/2/2023)  -     ezetimibe (ZETIA) 10 mg tablet; Take 10 mg by mouth daily  -     Evolocumab (Repatha SureClick) 652 MG/ML SOAJ; Inject 140 mg under the skin every 14 (fourteen) days  -     ergocalciferol (ERGOCALCIFEROL) 1 25 MG (51302 UT) capsule; Take 50,000 Units by mouth every 7 days          Subjective:      Patient ID: Grayson Hernández is a 45 y o  male  Presents today as a new patient due to ear concerns  Hearing gradually worsening  Difficulty with high pitched sounds  Bilateral ears feel blocked  Ringing tinnitus  No otalgia or otorrhea  No history of ear surgery  No current hearing aids  Moderate noise exposure over the years  The following portions of the patient's history were reviewed and updated as appropriate: allergies, current medications, past family history, past medical history, past social history, past surgical history and problem list     Review of Systems   Constitutional: Negative  HENT: Positive for hearing loss  Negative for congestion, ear discharge, ear pain, nosebleeds, postnasal drip, rhinorrhea, sinus pressure, sinus pain, sore throat, tinnitus and voice change  Eyes: Negative  Respiratory: Negative for chest tightness and shortness of breath  Cardiovascular: Negative  Gastrointestinal: Negative  Endocrine: Negative  Musculoskeletal: Negative  Skin: Negative for color change  Neurological: Negative for dizziness, numbness and headaches  Psychiatric/Behavioral: Negative  Objective:      Temp 97 6 °F (36 4 °C) (Temporal)   Ht 5' 9" (1 753 m)   Wt 125 kg (275 lb)   BMI 40 61 kg/m²          Physical Exam  Constitutional:       Appearance: He is well-developed  HENT:      Head: Normocephalic  Right Ear: Hearing, tympanic membrane, ear canal and external ear normal  No decreased hearing noted  No drainage or tenderness  Tympanic membrane is not perforated or erythematous  Left Ear: Hearing, tympanic membrane, ear canal and external ear normal  No decreased hearing noted  No drainage or tenderness  Tympanic membrane is not perforated or erythematous  Nose: Nose normal  No nasal deformity or septal deviation  Mouth/Throat:      Mouth: Mucous membranes are not pale and not dry  No oral lesions  Dentition: Normal dentition  Pharynx: Uvula midline  No oropharyngeal exudate  Neck:      Trachea: No tracheal deviation  Cardiovascular:      Rate and Rhythm: Normal rate  Pulmonary:      Effort: Pulmonary effort is normal  No accessory muscle usage or respiratory distress  Musculoskeletal:      Cervical back: Full passive range of motion without pain, normal range of motion and neck supple  Lymphadenopathy:      Cervical: No cervical adenopathy  Skin:     General: Skin is warm and dry  Neurological:      Mental Status: He is alert and oriented to person, place, and time  Cranial Nerves: No cranial nerve deficit  Sensory: No sensory deficit  Psychiatric:         Behavior: Behavior is cooperative

## 2023-03-03 ENCOUNTER — OFFICE VISIT (OUTPATIENT)
Dept: INTERNAL MEDICINE CLINIC | Facility: CLINIC | Age: 53
End: 2023-03-03

## 2023-03-03 VITALS
BODY MASS INDEX: 42.75 KG/M2 | WEIGHT: 288.6 LBS | HEART RATE: 92 BPM | SYSTOLIC BLOOD PRESSURE: 122 MMHG | HEIGHT: 69 IN | OXYGEN SATURATION: 100 % | DIASTOLIC BLOOD PRESSURE: 84 MMHG | TEMPERATURE: 98 F

## 2023-03-03 DIAGNOSIS — Z00.00 ANNUAL PHYSICAL EXAM: ICD-10-CM

## 2023-03-03 DIAGNOSIS — E29.1 HYPOGONADISM IN MALE: ICD-10-CM

## 2023-03-03 DIAGNOSIS — K21.9 GASTROESOPHAGEAL REFLUX DISEASE WITHOUT ESOPHAGITIS: ICD-10-CM

## 2023-03-03 DIAGNOSIS — N40.1 BENIGN PROSTATIC HYPERPLASIA WITH LOWER URINARY TRACT SYMPTOMS, SYMPTOM DETAILS UNSPECIFIED: ICD-10-CM

## 2023-03-03 DIAGNOSIS — L40.50 PSORIATIC ARTHROPATHY (HCC): Primary | ICD-10-CM

## 2023-03-03 PROBLEM — N40.0 BPH (BENIGN PROSTATIC HYPERPLASIA): Status: ACTIVE | Noted: 2023-03-03

## 2023-03-03 PROBLEM — R79.89 ABNORMAL LFTS: Status: ACTIVE | Noted: 2023-03-03

## 2023-03-03 RX ORDER — ESOMEPRAZOLE MAGNESIUM 40 MG/1
40 CAPSULE, DELAYED RELEASE ORAL DAILY
Qty: 90 CAPSULE | Refills: 2 | Status: SHIPPED | OUTPATIENT
Start: 2023-03-03

## 2023-03-03 NOTE — PATIENT INSTRUCTIONS
Wellness Visit for Adults   AMBULATORY CARE:   A wellness visit  is when you see your healthcare provider to get screened for health problems  Your healthcare provider will also give you advice on how to stay healthy  Write down your questions so you remember to ask them  Ask your healthcare provider how often you should have a wellness visit  What happens at a wellness visit:  Your healthcare provider will ask about your health, and your family history of health problems  This includes high blood pressure, heart disease, and cancer  He or she will ask if you have symptoms that concern you, if you smoke, and about your mood  You may also be asked about your intake of medicines, supplements, food, and alcohol  Any of the following may be done:  • Your weight  will be checked  Your height may also be checked so your body mass index (BMI) can be calculated  Your BMI shows if you are at a healthy weight  • Your blood pressure  and heart rate will be checked  Your temperature may also be checked  • Blood and urine tests  may be done  Blood tests may be done to check your cholesterol levels  Abnormal cholesterol levels increase your risk for heart disease and stroke  You may also need a blood or urine test to check for diabetes if you are at increased risk  Urine tests may be done to look for signs of an infection or kidney disease  • A physical exam  includes checking your heartbeat and lungs with a stethoscope  Your healthcare provider may also check your skin to look for sun damage  • Screening tests  may be recommended  A screening test is done to check for diseases that may not cause symptoms  The screening tests you may need depend on your age, gender, family history, and lifestyle habits  For example, colorectal screening may be recommended if you are 48years old or older  Screening tests you need if you are a woman:   • A Pap smear  is used to screen for cervical cancer   Pap smears are usually done every 3 to 5 years depending on your age  You may need them more often if you have had abnormal Pap smear test results in the past  Ask your healthcare provider how often you should have a Pap smear  • A mammogram  is an x-ray of your breasts to screen for breast cancer  Experts recommend mammograms every 2 years starting at age 48 years  You may need a mammogram at age 52 years or younger if you have an increased risk for breast cancer  Talk to your healthcare provider about when you should start having mammograms and how often you need them  Vaccines you may need:   • Get an influenza vaccine  every year  The influenza vaccine protects you from the flu  Several types of viruses cause the flu  The viruses change over time, so new vaccines are made each year  • Get a tetanus-diphtheria (Td) booster vaccine  every 10 years  This vaccine protects you against tetanus and diphtheria  Tetanus is a severe infection that may cause painful muscle spasms and lockjaw  Diphtheria is a severe bacterial infection that causes a thick covering in the back of your mouth and throat  • Get a human papillomavirus (HPV) vaccine  if you are female and aged 23 to 32 or male 23 to 24 and never received it  This vaccine protects you from HPV infection  HPV is the most common infection spread by sexual contact  HPV may also cause vaginal, penile, and anal cancers  • Get a pneumococcal vaccine  if you are aged 72 years or older  The pneumococcal vaccine is an injection given to protect you from pneumococcal disease  Pneumococcal disease is an infection caused by pneumococcal bacteria  The infection may cause pneumonia, meningitis, or an ear infection  • Get a shingles vaccine  if you are 60 or older, even if you have had shingles before  The shingles vaccine is an injection to protect you from the varicella-zoster virus  This is the same virus that causes chickenpox   Shingles is a painful rash that develops in people who had chickenpox or have been exposed to the virus  How to eat healthy:  My Plate is a model for planning healthy meals  It shows the types and amounts of foods that should go on your plate  Fruits and vegetables make up about half of your plate, and grains and protein make up the other half  A serving of dairy is included on the side of your plate  The amount of calories and serving sizes you need depends on your age, gender, weight, and height  Examples of healthy foods are listed below:  • Eat a variety of vegetables  such as dark green, red, and orange vegetables  You can also include canned vegetables low in sodium (salt) and frozen vegetables without added butter or sauces  • Eat a variety of fresh fruits , canned fruit in 100% juice, frozen fruit, and dried fruit  • Include whole grains  At least half of the grains you eat should be whole grains  Examples include whole-wheat bread, wheat pasta, brown rice, and whole-grain cereals such as oatmeal     • Eat a variety of protein foods such as seafood (fish and shellfish), lean meat, and poultry without skin (turkey and chicken)  Examples of lean meats include pork leg, shoulder, or tenderloin, and beef round, sirloin, tenderloin, and extra lean ground beef  Other protein foods include eggs and egg substitutes, beans, peas, soy products, nuts, and seeds  • Choose low-fat dairy products such as skim or 1% milk or low-fat yogurt, cheese, and cottage cheese  • Limit unhealthy fats  such as butter, hard margarine, and shortening  Exercise:  Exercise at least 30 minutes per day on most days of the week  Some examples of exercise include walking, biking, dancing, and swimming  You can also fit in more physical activity by taking the stairs instead of the elevator or parking farther away from stores  Include muscle strengthening activities 2 days each week  Regular exercise provides many health benefits   It helps you manage your weight, and decreases your risk for type 2 diabetes, heart disease, stroke, and high blood pressure  Exercise can also help improve your mood  Ask your healthcare provider about the best exercise plan for you  General health and safety guidelines:   • Do not smoke  Nicotine and other chemicals in cigarettes and cigars can cause lung damage  Ask your healthcare provider for information if you currently smoke and need help to quit  E-cigarettes or smokeless tobacco still contain nicotine  Talk to your healthcare provider before you use these products  • Limit alcohol  A drink of alcohol is 12 ounces of beer, 5 ounces of wine, or 1½ ounces of liquor  • Lose weight, if needed  Being overweight increases your risk of certain health conditions  These include heart disease, high blood pressure, type 2 diabetes, and certain types of cancer  • Protect your skin  Do not sunbathe or use tanning beds  Use sunscreen with a SPF 15 or higher  Apply sunscreen at least 15 minutes before you go outside  Reapply sunscreen every 2 hours  Wear protective clothing, hats, and sunglasses when you are outside  • Drive safely  Always wear your seatbelt  Make sure everyone in your car wears a seatbelt  A seatbelt can save your life if you are in an accident  Do not use your cell phone when you are driving  This could distract you and cause an accident  Pull over if you need to make a call or send a text message  • Practice safe sex  Use latex condoms if are sexually active and have more than one partner  Your healthcare provider may recommend screening tests for sexually transmitted infections (STIs)  • Wear helmets, lifejackets, and protective gear  Always wear a helmet when you ride a bike or motorcycle, go skiing, or play sports that could cause a head injury  Wear protective equipment when you play sports  Wear a lifejacket when you are on a boat or doing water sports      © Copyright [employer] 2022 Information is for End User's use only and may not be sold, redistributed or otherwise used for commercial purposes  The above information is an  only  It is not intended as medical advice for individual conditions or treatments  Talk to your doctor, nurse or pharmacist before following any medical regimen to see if it is safe and effective for you  Wellness Visit for Adults   AMBULATORY CARE:   A wellness visit  is when you see your healthcare provider to get screened for health problems  Your healthcare provider will also give you advice on how to stay healthy  Write down your questions so you remember to ask them  Ask your healthcare provider how often you should have a wellness visit  What happens at a wellness visit:  Your healthcare provider will ask about your health, and your family history of health problems  This includes high blood pressure, heart disease, and cancer  He or she will ask if you have symptoms that concern you, if you smoke, and about your mood  You may also be asked about your intake of medicines, supplements, food, and alcohol  Any of the following may be done:  • Your weight  will be checked  Your height may also be checked so your body mass index (BMI) can be calculated  Your BMI shows if you are at a healthy weight  • Your blood pressure  and heart rate will be checked  Your temperature may also be checked  • Blood and urine tests  may be done  Blood tests may be done to check your cholesterol levels  Abnormal cholesterol levels increase your risk for heart disease and stroke  You may also need a blood or urine test to check for diabetes if you are at increased risk  Urine tests may be done to look for signs of an infection or kidney disease  • A physical exam  includes checking your heartbeat and lungs with a stethoscope  Your healthcare provider may also check your skin to look for sun damage  • Screening tests  may be recommended   A screening test is done to check for diseases that may not cause symptoms  The screening tests you may need depend on your age, gender, family history, and lifestyle habits  For example, colorectal screening may be recommended if you are 48years old or older  Screening tests you need if you are a woman:   • A Pap smear  is used to screen for cervical cancer  Pap smears are usually done every 3 to 5 years depending on your age  You may need them more often if you have had abnormal Pap smear test results in the past  Ask your healthcare provider how often you should have a Pap smear  • A mammogram  is an x-ray of your breasts to screen for breast cancer  Experts recommend mammograms every 2 years starting at age 48 years  You may need a mammogram at age 52 years or younger if you have an increased risk for breast cancer  Talk to your healthcare provider about when you should start having mammograms and how often you need them  Vaccines you may need:   • Get an influenza vaccine  every year  The influenza vaccine protects you from the flu  Several types of viruses cause the flu  The viruses change over time, so new vaccines are made each year  • Get a tetanus-diphtheria (Td) booster vaccine  every 10 years  This vaccine protects you against tetanus and diphtheria  Tetanus is a severe infection that may cause painful muscle spasms and lockjaw  Diphtheria is a severe bacterial infection that causes a thick covering in the back of your mouth and throat  • Get a human papillomavirus (HPV) vaccine  if you are female and aged 23 to 32 or male 23 to 24 and never received it  This vaccine protects you from HPV infection  HPV is the most common infection spread by sexual contact  HPV may also cause vaginal, penile, and anal cancers  • Get a pneumococcal vaccine  if you are aged 72 years or older  The pneumococcal vaccine is an injection given to protect you from pneumococcal disease   Pneumococcal disease is an infection caused by pneumococcal bacteria  The infection may cause pneumonia, meningitis, or an ear infection  • Get a shingles vaccine  if you are 60 or older, even if you have had shingles before  The shingles vaccine is an injection to protect you from the varicella-zoster virus  This is the same virus that causes chickenpox  Shingles is a painful rash that develops in people who had chickenpox or have been exposed to the virus  How to eat healthy:  My Plate is a model for planning healthy meals  It shows the types and amounts of foods that should go on your plate  Fruits and vegetables make up about half of your plate, and grains and protein make up the other half  A serving of dairy is included on the side of your plate  The amount of calories and serving sizes you need depends on your age, gender, weight, and height  Examples of healthy foods are listed below:  • Eat a variety of vegetables  such as dark green, red, and orange vegetables  You can also include canned vegetables low in sodium (salt) and frozen vegetables without added butter or sauces  • Eat a variety of fresh fruits , canned fruit in 100% juice, frozen fruit, and dried fruit  • Include whole grains  At least half of the grains you eat should be whole grains  Examples include whole-wheat bread, wheat pasta, brown rice, and whole-grain cereals such as oatmeal     • Eat a variety of protein foods such as seafood (fish and shellfish), lean meat, and poultry without skin (turkey and chicken)  Examples of lean meats include pork leg, shoulder, or tenderloin, and beef round, sirloin, tenderloin, and extra lean ground beef  Other protein foods include eggs and egg substitutes, beans, peas, soy products, nuts, and seeds  • Choose low-fat dairy products such as skim or 1% milk or low-fat yogurt, cheese, and cottage cheese  • Limit unhealthy fats  such as butter, hard margarine, and shortening         Exercise:  Exercise at least 30 minutes per day on most days of the week  Some examples of exercise include walking, biking, dancing, and swimming  You can also fit in more physical activity by taking the stairs instead of the elevator or parking farther away from stores  Include muscle strengthening activities 2 days each week  Regular exercise provides many health benefits  It helps you manage your weight, and decreases your risk for type 2 diabetes, heart disease, stroke, and high blood pressure  Exercise can also help improve your mood  Ask your healthcare provider about the best exercise plan for you  General health and safety guidelines:   • Do not smoke  Nicotine and other chemicals in cigarettes and cigars can cause lung damage  Ask your healthcare provider for information if you currently smoke and need help to quit  E-cigarettes or smokeless tobacco still contain nicotine  Talk to your healthcare provider before you use these products  • Limit alcohol  A drink of alcohol is 12 ounces of beer, 5 ounces of wine, or 1½ ounces of liquor  • Lose weight, if needed  Being overweight increases your risk of certain health conditions  These include heart disease, high blood pressure, type 2 diabetes, and certain types of cancer  • Protect your skin  Do not sunbathe or use tanning beds  Use sunscreen with a SPF 15 or higher  Apply sunscreen at least 15 minutes before you go outside  Reapply sunscreen every 2 hours  Wear protective clothing, hats, and sunglasses when you are outside  • Drive safely  Always wear your seatbelt  Make sure everyone in your car wears a seatbelt  A seatbelt can save your life if you are in an accident  Do not use your cell phone when you are driving  This could distract you and cause an accident  Pull over if you need to make a call or send a text message  • Practice safe sex  Use latex condoms if are sexually active and have more than one partner   Your healthcare provider may recommend screening tests for sexually transmitted infections (STIs)  • Wear helmets, lifejackets, and protective gear  Always wear a helmet when you ride a bike or motorcycle, go skiing, or play sports that could cause a head injury  Wear protective equipment when you play sports  Wear a lifejacket when you are on a boat or doing water sports  © Copyright [employer] 2022 Information is for End User's use only and may not be sold, redistributed or otherwise used for commercial purposes  The above information is an  only  It is not intended as medical advice for individual conditions or treatments  Talk to your doctor, nurse or pharmacist before following any medical regimen to see if it is safe and effective for you  Wellness Visit for Adults   AMBULATORY CARE:   A wellness visit  is when you see your healthcare provider to get screened for health problems  Your healthcare provider will also give you advice on how to stay healthy  Write down your questions so you remember to ask them  Ask your healthcare provider how often you should have a wellness visit  What happens at a wellness visit:  Your healthcare provider will ask about your health, and your family history of health problems  This includes high blood pressure, heart disease, and cancer  He or she will ask if you have symptoms that concern you, if you smoke, and about your mood  You may also be asked about your intake of medicines, supplements, food, and alcohol  Any of the following may be done:  • Your weight  will be checked  Your height may also be checked so your body mass index (BMI) can be calculated  Your BMI shows if you are at a healthy weight  • Your blood pressure  and heart rate will be checked  Your temperature may also be checked  • Blood and urine tests  may be done  Blood tests may be done to check your cholesterol levels  Abnormal cholesterol levels increase your risk for heart disease and stroke   You may also need a blood or urine test to check for diabetes if you are at increased risk  Urine tests may be done to look for signs of an infection or kidney disease  • A physical exam  includes checking your heartbeat and lungs with a stethoscope  Your healthcare provider may also check your skin to look for sun damage  • Screening tests  may be recommended  A screening test is done to check for diseases that may not cause symptoms  The screening tests you may need depend on your age, gender, family history, and lifestyle habits  For example, colorectal screening may be recommended if you are 48years old or older  Screening tests you need if you are a woman:   • A Pap smear  is used to screen for cervical cancer  Pap smears are usually done every 3 to 5 years depending on your age  You may need them more often if you have had abnormal Pap smear test results in the past  Ask your healthcare provider how often you should have a Pap smear  • A mammogram  is an x-ray of your breasts to screen for breast cancer  Experts recommend mammograms every 2 years starting at age 48 years  You may need a mammogram at age 52 years or younger if you have an increased risk for breast cancer  Talk to your healthcare provider about when you should start having mammograms and how often you need them  Vaccines you may need:   • Get an influenza vaccine  every year  The influenza vaccine protects you from the flu  Several types of viruses cause the flu  The viruses change over time, so new vaccines are made each year  • Get a tetanus-diphtheria (Td) booster vaccine  every 10 years  This vaccine protects you against tetanus and diphtheria  Tetanus is a severe infection that may cause painful muscle spasms and lockjaw  Diphtheria is a severe bacterial infection that causes a thick covering in the back of your mouth and throat  • Get a human papillomavirus (HPV) vaccine  if you are female and aged 23 to 32 or male 23 to 24 and never received it   This vaccine protects you from HPV infection  HPV is the most common infection spread by sexual contact  HPV may also cause vaginal, penile, and anal cancers  • Get a pneumococcal vaccine  if you are aged 72 years or older  The pneumococcal vaccine is an injection given to protect you from pneumococcal disease  Pneumococcal disease is an infection caused by pneumococcal bacteria  The infection may cause pneumonia, meningitis, or an ear infection  • Get a shingles vaccine  if you are 60 or older, even if you have had shingles before  The shingles vaccine is an injection to protect you from the varicella-zoster virus  This is the same virus that causes chickenpox  Shingles is a painful rash that develops in people who had chickenpox or have been exposed to the virus  How to eat healthy:  My Plate is a model for planning healthy meals  It shows the types and amounts of foods that should go on your plate  Fruits and vegetables make up about half of your plate, and grains and protein make up the other half  A serving of dairy is included on the side of your plate  The amount of calories and serving sizes you need depends on your age, gender, weight, and height  Examples of healthy foods are listed below:  • Eat a variety of vegetables  such as dark green, red, and orange vegetables  You can also include canned vegetables low in sodium (salt) and frozen vegetables without added butter or sauces  • Eat a variety of fresh fruits , canned fruit in 100% juice, frozen fruit, and dried fruit  • Include whole grains  At least half of the grains you eat should be whole grains  Examples include whole-wheat bread, wheat pasta, brown rice, and whole-grain cereals such as oatmeal     • Eat a variety of protein foods such as seafood (fish and shellfish), lean meat, and poultry without skin (turkey and chicken)  Examples of lean meats include pork leg, shoulder, or tenderloin, and beef round, sirloin, tenderloin, and extra lean ground beef   Other protein foods include eggs and egg substitutes, beans, peas, soy products, nuts, and seeds  • Choose low-fat dairy products such as skim or 1% milk or low-fat yogurt, cheese, and cottage cheese  • Limit unhealthy fats  such as butter, hard margarine, and shortening  Exercise:  Exercise at least 30 minutes per day on most days of the week  Some examples of exercise include walking, biking, dancing, and swimming  You can also fit in more physical activity by taking the stairs instead of the elevator or parking farther away from stores  Include muscle strengthening activities 2 days each week  Regular exercise provides many health benefits  It helps you manage your weight, and decreases your risk for type 2 diabetes, heart disease, stroke, and high blood pressure  Exercise can also help improve your mood  Ask your healthcare provider about the best exercise plan for you  General health and safety guidelines:   • Do not smoke  Nicotine and other chemicals in cigarettes and cigars can cause lung damage  Ask your healthcare provider for information if you currently smoke and need help to quit  E-cigarettes or smokeless tobacco still contain nicotine  Talk to your healthcare provider before you use these products  • Limit alcohol  A drink of alcohol is 12 ounces of beer, 5 ounces of wine, or 1½ ounces of liquor  • Lose weight, if needed  Being overweight increases your risk of certain health conditions  These include heart disease, high blood pressure, type 2 diabetes, and certain types of cancer  • Protect your skin  Do not sunbathe or use tanning beds  Use sunscreen with a SPF 15 or higher  Apply sunscreen at least 15 minutes before you go outside  Reapply sunscreen every 2 hours  Wear protective clothing, hats, and sunglasses when you are outside  • Drive safely  Always wear your seatbelt  Make sure everyone in your car wears a seatbelt   A seatbelt can save your life if you are in an accident  Do not use your cell phone when you are driving  This could distract you and cause an accident  Pull over if you need to make a call or send a text message  • Practice safe sex  Use latex condoms if are sexually active and have more than one partner  Your healthcare provider may recommend screening tests for sexually transmitted infections (STIs)  • Wear helmets, lifejackets, and protective gear  Always wear a helmet when you ride a bike or motorcycle, go skiing, or play sports that could cause a head injury  Wear protective equipment when you play sports  Wear a lifejacket when you are on a boat or doing water sports  © Copyright [facility] [employer] 2022 Information is for End User's use only and may not be sold, redistributed or otherwise used for commercial purposes  The above information is an  only  It is not intended as medical advice for individual conditions or treatments  Talk to your doctor, nurse or pharmacist before following any medical regimen to see if it is safe and effective for you

## 2023-03-03 NOTE — PROGRESS NOTES
BMI Counseling: Body mass index is 42 62 kg/m²  The BMI is above normal  Nutrition recommendations include decreasing portion sizes, encouraging healthy choices of fruits and vegetables, decreasing fast food intake, consuming healthier snacks, limiting drinks that contain sugar, moderation in carbohydrate intake, increasing intake of lean protein, reducing intake of saturated and trans fat and reducing intake of cholesterol  Exercise recommendations include vigorous physical activity 75 minutes/week and obtaining a gym membership  No pharmacotherapy was ordered  Rationale for BMI follow-up plan is due to patient being overweight or obese  Depression Screening and Follow-up Plan: Patient was screened for depression during today's encounter  They screened negative with a PHQ-2 score of 0

## 2023-03-03 NOTE — PROGRESS NOTES
[facility]    NAME: Grayson Hernández  AGE: 45 y o  SEX: male  : 1970     DATE: 3/3/2023     Assessment and Plan:     Problem List Items Addressed This Visit        Musculoskeletal and Integument    Psoriatic arthropathy (HCC)       Genitourinary    BPH (benign prostatic hyperplasia)    Relevant Orders    PSA, Total Screen   Other Visit Diagnoses     Annual physical exam    -  Primary    Hypogonadism in male        Relevant Orders    Testosterone, free, total    Gastroesophageal reflux disease without esophagitis        Relevant Medications    esomeprazole (NexIUM) 40 MG capsule          Immunizations and preventive care screenings were discussed with patient today  Appropriate education was printed on patient's after visit summary  Discussed risks and benefits of prostate cancer screening  We discussed the controversial history of PSA screening for prostate cancer in the [place] as well as the risk of over detection and over treatment of prostate cancer by way of PSA screening  The patient understands that PSA blood testing is an imperfect way to screen for prostate cancer and that elevated PSA levels in the blood may also be caused by infection, inflammation, prostatic trauma or manipulation, urological procedures, or by benign prostatic enlargement  The role of the digital rectal examination in prostate cancer screening was also discussed and I discussed with him that there is large interobserver variability in the findings of digital rectal examination  Counseling:  Alcohol/drug use: discussed moderation in alcohol intake, the recommendations for healthy alcohol use, and avoidance of illicit drug use  Dental Health: discussed importance of regular tooth brushing, flossing, and dental visits    Injury prevention: discussed safety/seat belts, safety helmets, smoke detectors, carbon dioxide detectors, and smoking near bedding or upholstery  Sexual health: discussed sexually transmitted diseases, partner selection, use of condoms, avoidance of unintended pregnancy, and contraceptive alternatives  Exercise: the importance of regular exercise/physical activity was discussed  Recommend exercise 3-5 times per week for at least 30 minutes  ·          Return in about 6 months (around 9/3/2023)  Chief Complaint:     Chief Complaint   Patient presents with   • Follow-up   • Medication Refill   Annual wellness   History of Present Illness:   Came in for follow-up chronic medical condition and also refill of the medicine and annual wellness exam history of psoriatic arthritis both hip and x-ray done more than a year ago the hip pain symptoms controlled on current treatment denies any other new symptoms no chest pain heartburn controlled with Nexium blood work revealed slightly elevated LFT Zetia was discontinued started on Repatha seen by cardiology cardiology follow-up note reviewed  Adult Annual Physical   Patient here for a comprehensive physical exam  The patient reports no problems  Diet and Physical Activity  · Diet/Nutrition: poor diet, low calorie diet, low carb diet, adequate fiber intake and low  · Exercise: walking, 3-4 times a week on average and Check testosterone level  Depression Screening  PHQ-2/9 Depression Screening    Little interest or pleasure in doing things: 0 - not at all  Feeling down, depressed, or hopeless: 0 - not at all  PHQ-2 Score: 0  PHQ-2 Interpretation: Negative depression screen       General Health  · Sleep: sleeps poorly and melatonin  · Hearing: decreased - bilateral   · Vision: no vision problems  · Dental: regular dental visits          Health  · Symptoms include: erectile dysfunction     Review of Systems:     Review of Systems   Past Medical History:     Past Medical History:   Diagnosis Date   • GERD (gastroesophageal reflux disease)    • Psoriatic arthritis [facility]       Past Surgical History:     Past Surgical History:   Procedure Laterality Date   • HIP SURGERY        Family History:     Family History   Problem Relation Age of Onset   • No Known Problems Mother    • No Known Problems Father    • No Known Problems Sister    • No Known Problems Brother    • No Known Problems Daughter    • No Known Problems Maternal Grandmother    • No Known Problems Maternal Grandfather    • No Known Problems Paternal Grandmother    • No Known Problems Paternal Grandfather    • No Known Problems Maternal Aunt    • No Known Problems Maternal Uncle    • No Known Problems Paternal Aunt    • No Known Problems Paternal Uncle    • No Known Problems Cousin    • Alcohol abuse Neg Hx    • Arthritis Neg Hx    • Dementia Neg Hx    • Cancer Neg Hx    • COPD Neg Hx    • Depression Neg Hx    • Lupus Neg Hx    • Drug abuse Neg Hx    • Early death Neg Hx    • Hearing loss Neg Hx    • Hyperlipidemia Neg Hx    • Learning disabilities Neg Hx    • Substance Abuse Neg Hx    • Stroke Neg Hx    • Vision loss Neg Hx    • Pulmonary embolism Neg Hx    • Deep vein thrombosis Neg Hx    • Mental illness Neg Hx    • Crohn's disease Neg Hx    • Rheum arthritis Neg Hx    • Psoriasis Neg Hx    • Asthma Neg Hx       Social History:     Social History     Socioeconomic History   • Marital status: /Civil Union     Spouse name: None   • Number of children: None   • Years of education: None   • Highest education level: None   Occupational History   • None   Tobacco Use   • Smoking status: Never   • Smokeless tobacco: Never   Vaping Use   • Vaping Use: Never used   Substance and Sexual Activity   • Alcohol use: No   • Drug use: No   • Sexual activity: None   Other Topics Concern   • None   Social History Narrative   • None     Social Determinants of Health     Financial Resource Strain: Not on file   Food Insecurity: Not on file   Transportation Needs: Not on file   Physical Activity: Not on file   Stress: Not on file Social Connections: Not on file   Intimate Partner Violence: Not on file   Housing Stability: Not on file      Current Medications:     Current Outpatient Medications   Medication Sig Dispense Refill   • baclofen 10 mg tablet      • Enbrel 50 MG/ML SOSY Inject 1 mL (50 mg total) under the skin once a week 4 mL 5   • ergocalciferol (ERGOCALCIFEROL) 1 25 MG (81120 UT) capsule Take 50,000 Units by mouth every 7 days     • esomeprazole (NexIUM) 40 MG capsule Take 1 capsule (40 mg total) by mouth daily 90 capsule 1   • esomeprazole (NexIUM) 40 MG capsule Take 1 capsule (40 mg total) by mouth in the morning 90 capsule 2   • Evolocumab (Repatha SureClick) 454 MG/ML SOAJ Inject 140 mg under the skin every 14 (fourteen) days     • ezetimibe (ZETIA) 10 mg tablet Take 10 mg by mouth daily     • Ibuprofen 200 MG CAPS Ibuprofen 200 MG Oral Capsule  TAKE CAPSULE  PRN as needed    Refills: 0       Active     • lisinopril (ZESTRIL) 5 mg tablet Take 5 mg by mouth daily     • meloxicam (MOBIC) 15 mg tablet Take 1 tablet (15 mg total) by mouth daily as needed for moderate pain 90 tablet 0     No current facility-administered medications for this visit        Allergies:     No Known Allergies   Physical Exam:     /84   Pulse 92   Temp 98 °F (36 7 °C)   Ht 5' 9" (1 753 m)   Wt 131 kg (288 lb 9 6 oz)   SpO2 100%   BMI 42 62 kg/m²     Physical Exam     Stuart Matta MD  [facility] INTERNAL MEDICINE

## 2023-03-05 NOTE — ASSESSMENT & PLAN NOTE
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

## 2023-03-06 ENCOUNTER — OFFICE VISIT (OUTPATIENT)
Dept: URGENT CARE | Facility: CLINIC | Age: 53
End: 2023-03-06

## 2023-03-06 ENCOUNTER — TELEPHONE (OUTPATIENT)
Dept: ADMINISTRATIVE | Facility: OTHER | Age: 53
End: 2023-03-06

## 2023-03-06 VITALS
OXYGEN SATURATION: 99 % | HEART RATE: 76 BPM | RESPIRATION RATE: 18 BRPM | BODY MASS INDEX: 43.27 KG/M2 | WEIGHT: 293 LBS | SYSTOLIC BLOOD PRESSURE: 146 MMHG | DIASTOLIC BLOOD PRESSURE: 80 MMHG | TEMPERATURE: 95 F

## 2023-03-06 DIAGNOSIS — J06.9 VIRAL UPPER RESPIRATORY TRACT INFECTION: Primary | ICD-10-CM

## 2023-03-06 NOTE — LETTER
Procedure Request Form: Colonoscopy      Date Requested: 23  Patient: Dartimothy Ganser  Patient : 1970   Referring Provider: Trung Alexandre, MD        Date of Procedure ______________________________       The above patient has informed us that they have completed their   most recent Colonoscopy at your facility  Please complete   this form and attach all corresponding procedure reports/results  Comments __________________________________________________________  ____________________________________________________________________  ____________________________________________________________________  ____________________________________________________________________    Facility Completing Procedure _________________________________________    Form Completed By (print name) _______________________________________      Signature __________________________________________________________      These reports are needed for  compliance  Please fax this completed form and a copy of the procedure report to our office located at [place] [address] as soon as possible to Fax [phone] [place] Left: Phone [phone]    We thank you for your assistance in treating our mutual patient     [address] [facility]  [phone] F [phone]

## 2023-03-06 NOTE — PROGRESS NOTES
[facility]        NAME: Pham Alberto is a 45 y o  male  : 1970    MRN: [id number]  DATE: 2023  TIME: 10:13 AM    Assessment and Plan   Viral upper respiratory tract infection [J06 9]  1  Viral upper respiratory tract infection          Patient Instructions   Viral upper respiratory infection  Recommend flonase nasal spray and sudafed for postnasal drip/cough  Warm salt water gargles  Rest, fluids and supportive care  May benefit from a cool mist humidifier on night stand  Tylenol/ibuprofen as needed for pain/fever    Follow up with PCP in 3-5 days  Proceed to  ER if symptoms worsen  Chief Complaint     Chief Complaint   Patient presents with   • Cough     And chest congestion x  days          History of Present Illness       Dereck Pollack is a 78-year-old male who presents to clinic complaining of cough x4 days  He describes the cough as productive with a clear to white sputum  He also notes nasal congestion, night sweats, myalgias, and sore throat  He denies any fever, ear pain, shortness of breath, nausea, vomiting, diarrhea, loss of taste or smell, recent travel, or exposure to anyone known COVID-positive  Review of Systems   Review of Systems   Constitutional: Positive for chills  Negative for fever  HENT: Positive for congestion and sore throat  Negative for ear pain, rhinorrhea, sinus pressure and sinus pain  Respiratory: Positive for cough  Negative for chest tightness and shortness of breath  Gastrointestinal: Negative for diarrhea, nausea and vomiting  Musculoskeletal: Positive for myalgias  Neurological: Negative for headaches           Current Medications       Current Outpatient Medications:   •  baclofen 10 mg tablet, , Disp: , Rfl:   •  Enbrel 50 MG/ML SOSY, Inject 1 mL (50 mg total) under the skin once a week, Disp: 4 mL, Rfl: 5  •  ergocalciferol (ERGOCALCIFEROL) 1 25 MG (23703 UT) capsule, Take 50,000 Units by mouth every 7 days, Disp: , Rfl:   • esomeprazole (NexIUM) 40 MG capsule, Take 1 capsule (40 mg total) by mouth daily, Disp: 90 capsule, Rfl: 1  •  esomeprazole (NexIUM) 40 MG capsule, Take 1 capsule (40 mg total) by mouth in the morning, Disp: 90 capsule, Rfl: 2  •  Evolocumab (Repatha SureClick) 582 MG/ML SOAJ, Inject 140 mg under the skin every 14 (fourteen) days, Disp: , Rfl:   •  ezetimibe (ZETIA) 10 mg tablet, Take 10 mg by mouth daily, Disp: , Rfl:   •  Ibuprofen 200 MG CAPS, Ibuprofen 200 MG Oral Capsule TAKE CAPSULE  PRN as needed   Refills: 0     Active, Disp: , Rfl:   •  lisinopril (ZESTRIL) 5 mg tablet, Take 5 mg by mouth daily, Disp: , Rfl:   •  meloxicam (MOBIC) 15 mg tablet, Take 1 tablet (15 mg total) by mouth daily as needed for moderate pain, Disp: 90 tablet, Rfl: 0    Current Allergies     Allergies as of 03/06/2023   • (No Known Allergies)            The following portions of the patient's history were reviewed and updated as appropriate: allergies, current medications, past family history, past medical history, past social history, past surgical history and problem list      Past Medical History:   Diagnosis Date   • GERD (gastroesophageal reflux disease)    • Psoriatic arthritis ([facility] [address] )        Past Surgical History:   Procedure Laterality Date   • HIP SURGERY         Family History   Problem Relation Age of Onset   • No Known Problems Mother    • No Known Problems Father    • No Known Problems Sister    • No Known Problems Brother    • No Known Problems Daughter    • No Known Problems Maternal Grandmother    • No Known Problems Maternal Grandfather    • No Known Problems Paternal Grandmother    • No Known Problems Paternal Grandfather    • No Known Problems Maternal Aunt    • No Known Problems Maternal Uncle    • No Known Problems Paternal Aunt    • No Known Problems Paternal Uncle    • No Known Problems Cousin    • Alcohol abuse Neg Hx    • Arthritis Neg Hx    • Dementia Neg Hx    • Cancer Neg Hx    • COPD Neg Hx    • Depression Neg Hx    • Lupus Neg Hx    • Drug abuse Neg Hx    • Early death Neg Hx    • Hearing loss Neg Hx    • Hyperlipidemia Neg Hx    • Learning disabilities Neg Hx    • Substance Abuse Neg Hx    • Stroke Neg Hx    • Vision loss Neg Hx    • Pulmonary embolism Neg Hx    • Deep vein thrombosis Neg Hx    • Mental illness Neg Hx    • Crohn's disease Neg Hx    • Rheum arthritis Neg Hx    • Psoriasis Neg Hx    • Asthma Neg Hx          Medications have been verified  Objective   /80   Pulse 76   Temp (!) 95 °F (35 °C)   Resp 18   Wt 133 kg (293 lb)   SpO2 99%   BMI 43 27 kg/m²   No LMP for male patient  Physical Exam     Physical Exam  Vitals and nursing note reviewed  Constitutional:       General: He is not in acute distress  Appearance: Normal appearance  He is not ill-appearing  HENT:      Right Ear: Tympanic membrane, ear canal and external ear normal       Left Ear: Tympanic membrane, ear canal and external ear normal       Nose: Congestion present  Mouth/Throat:      Mouth: Mucous membranes are moist       Pharynx: No oropharyngeal exudate or posterior oropharyngeal erythema  Cardiovascular:      Rate and Rhythm: Normal rate and regular rhythm  Heart sounds: Normal heart sounds  Pulmonary:      Effort: Pulmonary effort is normal       Breath sounds: Normal breath sounds  Lymphadenopathy:      Cervical: No cervical adenopathy  Neurological:      Mental Status: He is alert and oriented to person, place, and time     Psychiatric:         Mood and Affect: Mood normal          Behavior: Behavior normal

## 2023-03-06 NOTE — TELEPHONE ENCOUNTER
----- Message from [facility] sent at 3/3/2023  2:36 PM EST -----  Regarding: care gap request  03/03/23 2:36 PM    Hello, our patient attached above has had CRC: Colonoscopy completed/performed  Please assist in updating the patient chart by making an External outreach to   [facility] located in [address] [place] [address]  [phone]  The date of service is 2 or 3 years ago      Thank you,  [facility] [address] INTERNAL MED

## 2023-03-08 ENCOUNTER — OFFICE VISIT (OUTPATIENT)
Dept: URGENT CARE | Facility: CLINIC | Age: 53
End: 2023-03-08

## 2023-03-08 VITALS
HEART RATE: 91 BPM | HEIGHT: 69 IN | SYSTOLIC BLOOD PRESSURE: 152 MMHG | TEMPERATURE: 98 F | DIASTOLIC BLOOD PRESSURE: 76 MMHG | WEIGHT: 296 LBS | BODY MASS INDEX: 43.84 KG/M2 | OXYGEN SATURATION: 98 % | RESPIRATION RATE: 20 BRPM

## 2023-03-08 DIAGNOSIS — L40.50 PSORIATIC ARTHRITIS (HCC): ICD-10-CM

## 2023-03-08 DIAGNOSIS — B30.9 ACUTE VIRAL CONJUNCTIVITIS OF LEFT EYE: ICD-10-CM

## 2023-03-08 DIAGNOSIS — J01.90 ACUTE RHINOSINUSITIS: Primary | ICD-10-CM

## 2023-03-08 RX ORDER — AZITHROMYCIN 250 MG/1
TABLET, FILM COATED ORAL
Qty: 6 TABLET | Refills: 0 | Status: SHIPPED | OUTPATIENT
Start: 2023-03-08 | End: 2023-03-12

## 2023-03-08 RX ORDER — BENZONATATE 200 MG/1
200 CAPSULE ORAL 3 TIMES DAILY PRN
Qty: 20 CAPSULE | Refills: 0 | Status: SHIPPED | OUTPATIENT
Start: 2023-03-08

## 2023-03-08 RX ORDER — KETOTIFEN FUMARATE 0.35 MG/ML
1 SOLUTION/ DROPS OPHTHALMIC 2 TIMES DAILY PRN
Qty: 5 ML | Refills: 0 | Status: SHIPPED | OUTPATIENT
Start: 2023-03-08

## 2023-03-08 RX ORDER — ETANERCEPT 50 MG/ML
SOLUTION SUBCUTANEOUS
Qty: 4 ML | Refills: 5 | Status: SHIPPED | OUTPATIENT
Start: 2023-03-08

## 2023-03-08 RX ORDER — ETANERCEPT 50 MG/ML
50 SOLUTION SUBCUTANEOUS WEEKLY
Qty: 4 ML | Refills: 5 | Status: SHIPPED | OUTPATIENT
Start: 2023-03-08

## 2023-03-08 NOTE — TELEPHONE ENCOUNTER
Upon review of the In Basket request and the patient's chart, initial outreach has been made via fax to facility  Please see Contacts section for details       Thank you  [facility]

## 2023-03-08 NOTE — PROGRESS NOTES
[facility]        NAME: Ollie Mcfadden is a 45 y o  male  : 1970    MRN: [id number]  DATE: 2023  TIME: 8:49 AM    Assessment and Plan   Acute rhinosinusitis [J01 90]  1  Acute rhinosinusitis  azithromycin (ZITHROMAX) 250 mg tablet    ketotifen (ZADITOR) 0 025 % ophthalmic solution    benzonatate (TESSALON) 200 MG capsule      2  Acute viral conjunctivitis of left eye          Rhinosinusitis likely viral nature  Azithromycin to cover for bacterial infection and to provide anti-inflammatory benefit  Tessalon Perles as needed for coughing  Zaditor prescribed for left eye conjunctivitis  Patient Instructions     Follow up with PCP in 3-5 days  Proceed to  ER if symptoms worsen  Chief Complaint     Chief Complaint   Patient presents with   • Cough     Seen Monday, c/o "getting worse"    • Sore Throat   • Earache         History of Present Illness     57-year-old male presents today with about 1 week of URI symptoms including nasal congestion, rhinorrhea, sore throat, coughing associated with chills, diaphoresis and headaches  Reports that his daughter had similar symptoms prior to the onset of his  Denies any dyspnea, chest pain, abdominal symptoms or dizziness  Last night he started having left eye irritation including itchiness and swelling  Review of Systems   Review of Systems   Constitutional: Positive for chills and diaphoresis  HENT: Positive for rhinorrhea and sore throat  Eyes: Positive for pain, redness and itching  Respiratory: Positive for cough  Negative for shortness of breath  Cardiovascular: Negative for chest pain  Gastrointestinal: Negative for abdominal pain and nausea  Neurological: Positive for headaches  Negative for dizziness       Current Medications       Current Outpatient Medications:   •  azithromycin (ZITHROMAX) 250 mg tablet, Take 2 tablets today then 1 tablet daily x 4 days, Disp: 6 tablet, Rfl: 0  •  benzonatate (TESSALON) 200 MG capsule, Take 1 capsule (200 mg total) by mouth 3 (three) times a day as needed for cough, Disp: 20 capsule, Rfl: 0  •  Enbrel 50 MG/ML SOSY, Inject 1 mL (50 mg total) under the skin once a week, Disp: 4 mL, Rfl: 5  •  ergocalciferol (ERGOCALCIFEROL) 1 25 MG (74614 UT) capsule, Take 50,000 Units by mouth every 7 days, Disp: , Rfl:   •  esomeprazole (NexIUM) 40 MG capsule, Take 1 capsule (40 mg total) by mouth daily, Disp: 90 capsule, Rfl: 1  •  esomeprazole (NexIUM) 40 MG capsule, Take 1 capsule (40 mg total) by mouth in the morning, Disp: 90 capsule, Rfl: 2  •  Evolocumab (Repatha SureClick) 167 MG/ML SOAJ, Inject 140 mg under the skin every 14 (fourteen) days, Disp: , Rfl:   •  ezetimibe (ZETIA) 10 mg tablet, Take 10 mg by mouth daily, Disp: , Rfl:   •  Ibuprofen 200 MG CAPS, Ibuprofen 200 MG Oral Capsule TAKE CAPSULE  PRN as needed   Refills: 0     Active, Disp: , Rfl:   •  ketotifen (ZADITOR) 0 025 % ophthalmic solution, Administer 1 drop into the left eye 2 (two) times a day as needed (eye irritation), Disp: 5 mL, Rfl: 0  •  lisinopril (ZESTRIL) 5 mg tablet, Take 5 mg by mouth daily, Disp: , Rfl:   •  meloxicam (MOBIC) 15 mg tablet, Take 1 tablet (15 mg total) by mouth daily as needed for moderate pain, Disp: 90 tablet, Rfl: 0  •  baclofen 10 mg tablet, , Disp: , Rfl:     Current Allergies     Allergies as of 03/08/2023   • (No Known Allergies)            The following portions of the patient's history were reviewed and updated as appropriate: allergies, current medications, past family history, past medical history, past social history, past surgical history and problem list      Past Medical History:   Diagnosis Date   • GERD (gastroesophageal reflux disease)    • Psoriatic arthritis (HCC)        Past Surgical History:   Procedure Laterality Date   • HIP SURGERY         Family History   Problem Relation Age of Onset   • No Known Problems Mother    • No Known Problems Father    • No Known Problems Sister    • No Known Problems Brother    • No Known Problems Daughter    • No Known Problems Maternal Grandmother    • No Known Problems Maternal Grandfather    • No Known Problems Paternal Grandmother    • No Known Problems Paternal Grandfather    • No Known Problems Maternal Aunt    • No Known Problems Maternal Uncle    • No Known Problems Paternal Aunt    • No Known Problems Paternal Uncle    • No Known Problems Cousin    • Alcohol abuse Neg Hx    • Arthritis Neg Hx    • Dementia Neg Hx    • Cancer Neg Hx    • COPD Neg Hx    • Depression Neg Hx    • Lupus Neg Hx    • Drug abuse Neg Hx    • Early death Neg Hx    • Hearing loss Neg Hx    • Hyperlipidemia Neg Hx    • Learning disabilities Neg Hx    • Substance Abuse Neg Hx    • Stroke Neg Hx    • Vision loss Neg Hx    • Pulmonary embolism Neg Hx    • Deep vein thrombosis Neg Hx    • Mental illness Neg Hx    • Crohn's disease Neg Hx    • Rheum arthritis Neg Hx    • Psoriasis Neg Hx    • Asthma Neg Hx          Medications have been verified  Objective   /76   Pulse 91   Temp 98 °F (36 7 °C)   Resp 20   Ht 5' 9" (1 753 m)   Wt 134 kg (296 lb)   SpO2 98%   BMI 43 71 kg/m²   No LMP for male patient  Physical Exam     Physical Exam  Vitals and nursing note reviewed  Constitutional:       General: He is in acute distress  Appearance: Normal appearance  He is well-developed  He is not ill-appearing, toxic-appearing or diaphoretic  HENT:      Head: Normocephalic and atraumatic  Comments: PERRLA, extraocular movement intact, left conjunctival injection  Right Ear: Tympanic membrane and ear canal normal  No tenderness  Left Ear: Tympanic membrane and ear canal normal  No tenderness  Nose: Rhinorrhea present  Mouth/Throat:      Mouth: Mucous membranes are moist       Pharynx: Uvula midline  No posterior oropharyngeal erythema  Tonsils: 1+ on the right  1+ on the left  Eyes:      General:         Right eye: No discharge  Left eye: No discharge  Conjunctiva/sclera: Conjunctivae normal    Cardiovascular:      Rate and Rhythm: Normal rate and regular rhythm  Pulmonary:      Effort: Pulmonary effort is normal  No respiratory distress  Breath sounds: Normal breath sounds  No wheezing, rhonchi or rales  Skin:     General: Skin is warm  Findings: No erythema  Neurological:      General: No focal deficit present  Mental Status: He is alert and oriented to person, place, and time  Psychiatric:         Mood and Affect: Mood normal          Behavior: Behavior normal          Thought Content:  Thought content normal          Judgment: Judgment normal

## 2023-03-14 NOTE — TELEPHONE ENCOUNTER
Upon review of the In Basket request we were able to locate, review, and update the patient chart as requested for CRC: Colonoscopy  Any additional questions or concerns should be emailed to the Practice Liaisons via the appropriate education email address, please do not reply via In Basket      Thank you  Leatha Means

## 2023-03-17 ENCOUNTER — TELEPHONE (OUTPATIENT)
Dept: RHEUMATOLOGY | Facility: CLINIC | Age: 53
End: 2023-03-17

## 2023-03-20 NOTE — TELEPHONE ENCOUNTER
Caller: Payton Moore     Doctor: Chava Worthy     Reason for call: Patient called he got a letter in the mail stating the clinical review for his Enbrel is expiring on 4/18  I let him know a new pre auth was faxed over  He is wondering if this is the same  He would like a call back       Call back#: [phone]

## 2023-03-24 NOTE — TELEPHONE ENCOUNTER
Caller: Patient    Doctor: Ada Ferreira    Reason for call: Patient received mail from the pharmacy in regards to his ENBREL stating that the paperwork that was sent was filled out incorrectly  Patient would like a phone call back in regards to this  Please Advise!     Call back#: [phone]

## 2023-06-08 ENCOUNTER — OFFICE VISIT (OUTPATIENT)
Dept: OBGYN CLINIC | Facility: CLINIC | Age: 53
End: 2023-06-08
Payer: COMMERCIAL

## 2023-06-08 VITALS
HEIGHT: 69 IN | SYSTOLIC BLOOD PRESSURE: 127 MMHG | HEART RATE: 73 BPM | BODY MASS INDEX: 41.74 KG/M2 | WEIGHT: 281.8 LBS | DIASTOLIC BLOOD PRESSURE: 86 MMHG

## 2023-06-08 DIAGNOSIS — Z01.818 PREOPERATIVE TESTING: ICD-10-CM

## 2023-06-08 DIAGNOSIS — S53.431A COMPLETE TEAR OF LATERAL COLLATERAL LIGAMENT OF RIGHT ELBOW: Primary | ICD-10-CM

## 2023-06-08 PROCEDURE — 99205 OFFICE O/P NEW HI 60 MIN: CPT | Performed by: ORTHOPAEDIC SURGERY

## 2023-06-08 RX ORDER — CHLORHEXIDINE GLUCONATE 0.12 MG/ML
15 RINSE ORAL ONCE
OUTPATIENT
Start: 2023-06-08 | End: 2023-06-08

## 2023-06-08 RX ORDER — CHLORHEXIDINE GLUCONATE 4 G/100ML
SOLUTION TOPICAL DAILY PRN
OUTPATIENT
Start: 2023-06-08

## 2023-06-08 NOTE — H&P (VIEW-ONLY)
[address]  [address]  [facility] [address] [phone]       Jameel Green  [id number]  1970    ORTHOPAEDIC SURGERY OUTPATIENT NOTE  6/8/2023    HISTORY:  45 y.o. male presents today consultation for right elbow pain. He was referred by his PCP Dr. Jose Fernandez. He is RHD. He states in the middle of the night on 5/29/23 and felt some discomfort in his elbow any try to extend it, feeling an uncomfortable sensation. He was seen by Dr. Thomas Wood at [facility] and was diagnosed with possible biceps tendon torn. He had a right elbow MRI on 6/6/23 and showed complete tear of the lateral collateral ligament. He states he is having generalized right elbow pain. He states he has pain with lifting, twisting and constant throbbing pain at night. He denies any numbness or tingling. 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        Past Medical History:   Diagnosis Date   • GERD (gastroesophageal reflux disease)    • Psoriatic arthritis ([address])        Past Surgical History:   Procedure Laterality Date   • HIP SURGERY         Social History     Socioeconomic History   • Marital status: /Civil Union     Spouse name: Not on file   • Number of children: Not on file   • Years of education: Not on file   • Highest education level: Not on file   Occupational History   • Not on file   Tobacco Use   • Smoking status: Never   • Smokeless tobacco: Never   Vaping Use   • Vaping Use: Never used   Substance and Sexual Activity   • Alcohol use: No   • Drug use: No   • Sexual activity: Not on file   Other Topics Concern   • Not on file   Social History Narrative   • Not on file     Social Determinants of Health     Financial Resource Strain: Not on file   Food Insecurity: Not on file   Transportation Needs: Not on file   Physical Activity: Not on file   Stress: Not on file   Social Connections: Not on file   Intimate Partner Violence: Not on file   Housing Stability: Not on file       Family History   Problem Relation Age of Onset   • No Known Problems Mother    • No Known Problems Father    • No Known Problems Sister    • No Known Problems Brother    • No Known Problems Daughter    • No Known Problems Maternal Grandmother    • No Known Problems Maternal Grandfather    • No Known Problems Paternal Grandmother    • No Known Problems Paternal Grandfather    • No Known Problems Maternal Aunt    • No Known Problems Maternal Uncle    • No Known Problems Paternal Aunt    • No Known Problems Paternal Uncle    • No Known Problems Cousin    • Alcohol abuse Neg Hx    • Arthritis Neg Hx    • Dementia Neg Hx    • Cancer Neg Hx    • COPD Neg Hx    • Depression Neg Hx    • Lupus Neg Hx    • Drug abuse Neg Hx    • Early death Neg Hx    • Hearing loss Neg Hx    • Hyperlipidemia Neg Hx    • Learning disabilities Neg Hx    • Substance Abuse Neg Hx    • Stroke Neg Hx    • Vision loss Neg Hx    • Pulmonary embolism Neg Hx    • Deep vein thrombosis Neg Hx    • Mental illness Neg Hx    • Crohn's disease Neg Hx    • Rheum arthritis Neg Hx    • Psoriasis Neg Hx    • Asthma Neg Hx         Patient's Medications   New Prescriptions    No medications on file   Previous Medications    BACLOFEN 10 MG TABLET        BENZONATATE (TESSALON) 200 MG CAPSULE    Take 1 capsule (200 mg total) by mouth 3 (three) times a day as needed for cough    ENBREL 50 MG/ML SOSY    Inject 1 mL (50 mg) under the skin once a week    ERGOCALCIFEROL (ERGOCALCIFEROL) 1.25 MG (97579 UT) CAPSULE    Take 50,000 Units by mouth every 7 days    ESOMEPRAZOLE (NEXIUM) 40 MG CAPSULE    Take 1 capsule (40 mg total) by mouth daily    ESOMEPRAZOLE (NEXIUM) 40 MG CAPSULE    Take 1 capsule (40 mg total) by mouth in the morning    ETANERCEPT (ENBREL) 50 MG/ML SOSY    Inject 1 mL (50 mg total) under the skin once a week    EVOLOCUMAB (REPATHA SURECLICK) 711 MG/ML SOAJ    Inject 140 mg under the skin every 14 (fourteen) days EZETIMIBE (ZETIA) 10 MG TABLET    Take 10 mg by mouth daily    IBUPROFEN 200 MG CAPS    Ibuprofen 200 MG Oral Capsule  TAKE CAPSULE  PRN as needed    Refills: 0       Active    KETOTIFEN (ZADITOR) 0.025 % OPHTHALMIC SOLUTION    Administer 1 drop into the left eye 2 (two) times a day as needed (eye irritation)    LISINOPRIL (ZESTRIL) 5 MG TABLET    Take 5 mg by mouth daily    MELOXICAM (MOBIC) 15 MG TABLET    Take 1 tablet (15 mg total) by mouth daily as needed for moderate pain   Modified Medications    No medications on file   Discontinued Medications    No medications on file       No Known Allergies     /86 (BP Location: Left arm, Patient Position: Sitting, Cuff Size: Standard)   Pulse 73   Ht 5' 9" (1.753 m)   Wt 128 kg (281 lb 12.8 oz)   BMI 41.61 kg/m²      REVIEW OF SYSTEMS:  Constitutional: Negative. HEENT: Negative. Respiratory: Negative. Skin: Negative. Neurological: Negative. Psychiatric/Behavioral: Negative. Musculoskeletal: Negative except for that mentioned in the HPI.     PHYSICAL EXAM:      [unfilled]    R elbow:  Flexion: 140 degrees  Extension: lacks 5 degrees of full   Pronation: 80 degrees  Supination: 80 degrees    TTP Lateral Epicondyle: negative  TTP Medial Epicondyle: negative  TTP Olecranon: negative  TTP Radial Head: negative  TTP Biceps Tendon: negative    Strength:  Flexion: 5/5  Extension: 5/5  Pronation: 5/5  Supination: 5/5    Pain with resisted wrist extension: negative  Pain with resisted 3rd finger extension: negative  Pain with resisted wrist flexion: negative    Varus laxity: negative  Valgus laxity: negative  Milking maneuver: negative  Moving valgus stress test: negative      Negative push off test     Cubital tunnel Tinel's: negative    Radial/median/ulnar nerve intact    <2 sec cap refill        IMAGING: MRI right elbow demonstrates complete tear lateral collateral ligament tear and large effusion       ASSESSMENT AND PLAN:  45 y.o. male right elbow lateral   collateral ligament tear. MRI right elbow was reviewed in the office today. Discussed with patient surgery for right elbow lateral collateral ligament  repair. He agrees and would like to proceed forward scheduling for surgery. The patient understands the risks and benefits of the procedure with risks including pain, stiffness, infection, neurovascular injury, recurrence of symptoms, failure of surgical procedure, inadvertent intraoperative complications, blood loss, blood clots, allergic reaction to anesthesia, stroke, heart attack, all up to and including to death. The patient understood and did consent for surgery today.        Scribe Attestation    I,:  Kandy Thomas am acting as a scribe while in the presence of the attending physician.:       I,:  Naya Covarrubias personally performed the services described in this documentation    as scribed in my presence.:

## 2023-06-08 NOTE — PROGRESS NOTES
[address] [facility] [address]  [phone]       Jina Benitez  [id number]  1970    ORTHOPAEDIC SURGERY OUTPATIENT NOTE  6/8/2023    HISTORY:  45 y o  male presents today consultation for right elbow pain  He was referred by his PCP Dr Keith Carlton  He is RHD  He states in the middle of the night on 5/29/23 and felt some discomfort in his elbow any try to extend it, feeling an uncomfortable sensation  He was seen by Dr Ilene Kwon at [facility] and was diagnosed with possible biceps tendon torn  He had a right elbow MRI on 6/6/23 and showed complete tear of the lateral collateral ligament  He states he is having generalized right elbow pain  He states he has pain with lifting, twisting and constant throbbing pain at night  He denies any numbness or tingling  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[employer]         Past Medical History:   Diagnosis Date   • GERD (gastroesophageal reflux disease)    • Psoriatic arthritis ([address] )        Past Surgical History:   Procedure Laterality Date   • HIP SURGERY         Social History     Socioeconomic History   • Marital status: /Civil Union     Spouse name: Not on file   • Number of children: Not on file   • Years of education: Not on file   • Highest education level: Not on file   Occupational History   • Not on file   Tobacco Use   • Smoking status: Never   • Smokeless tobacco: Never   Vaping Use   • Vaping Use: Never used   Substance and Sexual Activity   • Alcohol use: No   • Drug use: No   • Sexual activity: Not on file   Other Topics Concern   • Not on file   Social History Narrative   • Not on file     Social Determinants of Health     Financial Resource Strain: Not on file   Food Insecurity: Not on file   Transportation Needs: Not on file   Physical Activity: Not on file   Stress: Not on file   Social Connections: Not on file   Intimate Partner Violence: Not on file   Housing Stability: Not on file       Family History   Problem Relation Age of Onset   • No Known Problems Mother    • No Known Problems Father    • No Known Problems Sister    • No Known Problems Brother    • No Known Problems Daughter    • No Known Problems Maternal Grandmother    • No Known Problems Maternal Grandfather    • No Known Problems Paternal Grandmother    • No Known Problems Paternal Grandfather    • No Known Problems Maternal Aunt    • No Known Problems Maternal Uncle    • No Known Problems Paternal Aunt    • No Known Problems Paternal Uncle    • No Known Problems Cousin    • Alcohol abuse Neg Hx    • Arthritis Neg Hx    • Dementia Neg Hx    • Cancer Neg Hx    • COPD Neg Hx    • Depression Neg Hx    • Lupus Neg Hx    • Drug abuse Neg Hx    • Early death Neg Hx    • Hearing loss Neg Hx    • Hyperlipidemia Neg Hx    • Learning disabilities Neg Hx    • Substance Abuse Neg Hx    • Stroke Neg Hx    • Vision loss Neg Hx    • Pulmonary embolism Neg Hx    • Deep vein thrombosis Neg Hx    • Mental illness Neg Hx    • Crohn's disease Neg Hx    • Rheum arthritis Neg Hx    • Psoriasis Neg Hx    • Asthma Neg Hx         Patient's Medications   New Prescriptions    No medications on file   Previous Medications    BACLOFEN 10 MG TABLET        BENZONATATE (TESSALON) 200 MG CAPSULE    Take 1 capsule (200 mg total) by mouth 3 (three) times a day as needed for cough    ENBREL 50 MG/ML SOSY    Inject 1 mL (50 mg) under the skin once a week    ERGOCALCIFEROL (ERGOCALCIFEROL) 1 25 MG (82508 UT) CAPSULE    Take 50,000 Units by mouth every 7 days    ESOMEPRAZOLE (NEXIUM) 40 MG CAPSULE    Take 1 capsule (40 mg total) by mouth daily    ESOMEPRAZOLE (NEXIUM) 40 MG CAPSULE    Take 1 capsule (40 mg total) by mouth in the morning    ETANERCEPT (ENBREL) 50 MG/ML SOSY    Inject 1 mL (50 mg total) under the skin once a week    EVOLOCUMAB (REPATHA SURECLICK) 652 MG/ML SOAJ    Inject 140 mg under the skin every 14 (fourteen) days "   EZETIMIBE (ZETIA) 10 MG TABLET    Take 10 mg by mouth daily    IBUPROFEN 200 MG CAPS    Ibuprofen 200 MG Oral Capsule  TAKE CAPSULE  PRN as needed    Refills: 0       Active    KETOTIFEN (ZADITOR) 0 025 % OPHTHALMIC SOLUTION    Administer 1 drop into the left eye 2 (two) times a day as needed (eye irritation)    LISINOPRIL (ZESTRIL) 5 MG TABLET    Take 5 mg by mouth daily    MELOXICAM (MOBIC) 15 MG TABLET    Take 1 tablet (15 mg total) by mouth daily as needed for moderate pain   Modified Medications    No medications on file   Discontinued Medications    No medications on file       No Known Allergies     /86 (BP Location: Left arm, Patient Position: Sitting, Cuff Size: Standard)   Pulse 73   Ht 5' 9\" (1 753 m)   Wt 128 kg (281 lb 12 8 oz)   BMI 41 61 kg/m²      REVIEW OF SYSTEMS:  Constitutional: Negative  HEENT: Negative  Respiratory: Negative  Skin: Negative  Neurological: Negative  Psychiatric/Behavioral: Negative  Musculoskeletal: Negative except for that mentioned in the HPI      PHYSICAL EXAM:      [unfilled]    R elbow:  Flexion: 140 degrees  Extension: lacks 5 degrees of full   Pronation: 80 degrees  Supination: 80 degrees    TTP Lateral Epicondyle: negative  TTP Medial Epicondyle: negative  TTP Olecranon: negative  TTP Radial Head: negative  TTP Biceps Tendon: negative    Strength:  Flexion: 5/5  Extension: 5/5  Pronation: 5/5  Supination: 5/5    Pain with resisted wrist extension: negative  Pain with resisted 3rd finger extension: negative  Pain with resisted wrist flexion: negative    Varus laxity: negative  Valgus laxity: negative  Milking maneuver: negative  Moving valgus stress test: negative      Negative push off test     Cubital tunnel Tinel's: negative    Radial/median/ulnar nerve intact    <2 sec cap refill        IMAGING: MRI right elbow demonstrates complete tear lateral collateral ligament tear and large effusion       ASSESSMENT AND PLAN:  45 y o  male right " elbow lateral   collateral ligament tear  MRI right elbow was reviewed in the office today  Discussed with patient surgery for right elbow lateral collateral ligament  repair  He agrees and would like to proceed forward scheduling for surgery  The patient understands the risks and benefits of the procedure with risks including pain, stiffness, infection, neurovascular injury, recurrence of symptoms, failure of surgical procedure, inadvertent intraoperative complications, blood loss, blood clots, allergic reaction to anesthesia, stroke, heart attack, all up to and including to death  The patient understood and did consent for surgery today         Scribe Attestation    I,:  Lizett Steele am acting as a scribe while in the presence of the attending physician :       I,:  Isaura Prieto personally performed the services described in this documentation    as scribed in my presence :

## 2023-06-12 ENCOUNTER — TELEPHONE (OUTPATIENT)
Dept: OBGYN CLINIC | Facility: CLINIC | Age: 53
End: 2023-06-12

## 2023-06-12 NOTE — TELEPHONE ENCOUNTER
Pt called to schedule his elbow surgery with Dr Nelly Judd  OR and p/o dates agreed upon  Reviewed pre-op instructions and answered all questions to patient's satisfaction

## 2023-06-30 NOTE — PRE-PROCEDURE INSTRUCTIONS
Pre-Surgery Instructions:   Medication Instructions   • esomeprazole (NexIUM) 40 MG capsule Take as directed   • etanercept (Enbrel) 50 mg/mL SOSY Stop taking 7 days prior to surgery. • Evolocumab (Repatha SureClick) 522 MG/ML SOAJ Take as directed   • ezetimibe (ZETIA) 10 mg tablet Take as directed   • Ibuprofen 200 MG CAPS Stop taking 7 days prior to surgery. • lisinopril (ZESTRIL) 5 mg tablet Hold day of surgery. • meloxicam (MOBIC) 15 mg tablet Stop taking 7 days prior to surgery. Medication instructions for day surgery reviewed. Please use only a sip of water to take your instructed medications. Avoid all over the counter vitamins, supplements and NSAIDS for one week prior to surgery per anesthesia guidelines. Tylenol is ok to take as needed. You will receive a call one business day prior to surgery with an arrival time and hospital directions. If your surgery is scheduled on a Monday, the hospital will be calling you on the Friday prior to your surgery. If you have not heard from anyone by 8pm, please call the hospital supervisor through the hospital  at [phone]. Rebekah Sawyer [phone]). Do not eat or drink anything after midnight the night before your surgery, including candy, mints, lifesavers, or chewing gum. Do not drink alcohol 24hrs before your surgery. Try not to smoke at least 24hrs before your surgery. Follow the pre surgery showering instructions as listed in the [facility] Surgical Experience Booklet” or otherwise provided by your surgeon's office. Do not shave the surgical area 24 hours before surgery. Do not apply any lotions, creams, including makeup, cologne, deodorant, or perfumes after showering on the day of your surgery. No contact lenses, eye make-up, or artificial eyelashes. Remove nail polish, including gel polish, and any artificial, gel, or acrylic nails if possible. Remove all jewelry including rings and body piercing jewelry.      Wear causal clothing that is easy to take on and off. Consider your type of surgery. Keep any valuables, jewelry, piercings at home. Please bring any specially ordered equipment (sling, braces) if indicated. Arrange for a responsible person to drive you to and from the hospital on the day of your surgery. Visitor Guidelines discussed. Call the surgeon's office with any new illnesses, exposures, or additional questions prior to surgery. Please reference your [facility] Surgical Experience Booklet” for additional information to prepare for your upcoming surgery.

## 2023-07-07 ENCOUNTER — ANESTHESIA EVENT (OUTPATIENT)
Dept: PERIOP | Facility: HOSPITAL | Age: 53
End: 2023-07-07
Payer: COMMERCIAL

## 2023-07-07 ENCOUNTER — APPOINTMENT (OUTPATIENT)
Dept: RADIOLOGY | Facility: HOSPITAL | Age: 53
End: 2023-07-07
Payer: COMMERCIAL

## 2023-07-07 ENCOUNTER — ANESTHESIA (OUTPATIENT)
Dept: PERIOP | Facility: HOSPITAL | Age: 53
End: 2023-07-07
Payer: COMMERCIAL

## 2023-07-07 ENCOUNTER — HOSPITAL ENCOUNTER (OUTPATIENT)
Facility: HOSPITAL | Age: 53
Setting detail: OUTPATIENT SURGERY
Discharge: HOME | End: 2023-07-07
Attending: ORTHOPAEDIC SURGERY | Admitting: ORTHOPAEDIC SURGERY
Payer: COMMERCIAL

## 2023-07-07 VITALS
RESPIRATION RATE: 14 BRPM | DIASTOLIC BLOOD PRESSURE: 72 MMHG | BODY MASS INDEX: 41.62 KG/M2 | SYSTOLIC BLOOD PRESSURE: 110 MMHG | HEART RATE: 64 BPM | WEIGHT: 281 LBS | OXYGEN SATURATION: 98 % | TEMPERATURE: 97.8 F | HEIGHT: 69 IN

## 2023-07-07 DIAGNOSIS — S53.431A COMPLETE TEAR OF LATERAL COLLATERAL LIGAMENT OF RIGHT ELBOW: Primary | ICD-10-CM

## 2023-07-07 DIAGNOSIS — Z01.818 PREOPERATIVE TESTING: ICD-10-CM

## 2023-07-07 PROCEDURE — 24343 REPR ELBOW LAT LIGMNT W/TISS: CPT | Performed by: ORTHOPAEDIC SURGERY

## 2023-07-07 PROCEDURE — C1713 ANCHOR/SCREW BN/BN,TIS/BN: HCPCS | Performed by: ORTHOPAEDIC SURGERY

## 2023-07-07 PROCEDURE — 73070 X-RAY EXAM OF ELBOW: CPT

## 2023-07-07 PROCEDURE — 24343 REPR ELBOW LAT LIGMNT W/TISS: CPT | Performed by: PHYSICIAN ASSISTANT

## 2023-07-07 DEVICE — IB KIT, PLUS, BC, W/ CC FT AND JUMPSTART
Type: IMPLANTABLE DEVICE | Site: ELBOW | Status: FUNCTIONAL
Brand: ARTHREX®

## 2023-07-07 RX ORDER — ROCURONIUM BROMIDE 10 MG/ML
INJECTION, SOLUTION INTRAVENOUS AS NEEDED
Status: DISCONTINUED | OUTPATIENT
Start: 2023-07-07 | End: 2023-07-07

## 2023-07-07 RX ORDER — METOCLOPRAMIDE HYDROCHLORIDE 5 MG/ML
10 INJECTION INTRAMUSCULAR; INTRAVENOUS ONCE AS NEEDED
Status: DISCONTINUED | OUTPATIENT
Start: 2023-07-07 | End: 2023-07-07 | Stop reason: HOSPADM

## 2023-07-07 RX ORDER — OXYCODONE HYDROCHLORIDE 5 MG/1
5 TABLET ORAL EVERY 6 HOURS PRN
Qty: 30 TABLET | Refills: 0 | Status: SHIPPED | OUTPATIENT
Start: 2023-07-07 | End: 2023-07-17

## 2023-07-07 RX ORDER — DEXAMETHASONE SODIUM PHOSPHATE 10 MG/ML
INJECTION, SOLUTION INTRAMUSCULAR; INTRAVENOUS AS NEEDED
Status: DISCONTINUED | OUTPATIENT
Start: 2023-07-07 | End: 2023-07-07

## 2023-07-07 RX ORDER — MAGNESIUM HYDROXIDE 1200 MG/15ML
LIQUID ORAL AS NEEDED
Status: DISCONTINUED | OUTPATIENT
Start: 2023-07-07 | End: 2023-07-07 | Stop reason: HOSPADM

## 2023-07-07 RX ORDER — HYDROMORPHONE HCL IN WATER/PF 6 MG/30 ML
0.2 PATIENT CONTROLLED ANALGESIA SYRINGE INTRAVENOUS
Status: DISCONTINUED | OUTPATIENT
Start: 2023-07-07 | End: 2023-07-07 | Stop reason: HOSPADM

## 2023-07-07 RX ORDER — FENTANYL CITRATE 50 UG/ML
INJECTION, SOLUTION INTRAMUSCULAR; INTRAVENOUS
Status: COMPLETED | OUTPATIENT
Start: 2023-07-07 | End: 2023-07-07

## 2023-07-07 RX ORDER — CEPHALEXIN 500 MG/1
500 CAPSULE ORAL EVERY 6 HOURS SCHEDULED
Qty: 8 CAPSULE | Refills: 0 | Status: SHIPPED | OUTPATIENT
Start: 2023-07-07 | End: 2023-07-09

## 2023-07-07 RX ORDER — MIDAZOLAM HYDROCHLORIDE 2 MG/2ML
INJECTION, SOLUTION INTRAMUSCULAR; INTRAVENOUS
Status: COMPLETED | OUTPATIENT
Start: 2023-07-07 | End: 2023-07-07

## 2023-07-07 RX ORDER — SODIUM CHLORIDE, SODIUM LACTATE, POTASSIUM CHLORIDE, CALCIUM CHLORIDE 600; 310; 30; 20 MG/100ML; MG/100ML; MG/100ML; MG/100ML
INJECTION, SOLUTION INTRAVENOUS CONTINUOUS PRN
Status: DISCONTINUED | OUTPATIENT
Start: 2023-07-07 | End: 2023-07-07

## 2023-07-07 RX ORDER — ONDANSETRON 2 MG/ML
INJECTION INTRAMUSCULAR; INTRAVENOUS AS NEEDED
Status: DISCONTINUED | OUTPATIENT
Start: 2023-07-07 | End: 2023-07-07

## 2023-07-07 RX ORDER — ALBUTEROL SULFATE 2.5 MG/3ML
2.5 SOLUTION RESPIRATORY (INHALATION) ONCE AS NEEDED
Status: DISCONTINUED | OUTPATIENT
Start: 2023-07-07 | End: 2023-07-07 | Stop reason: HOSPADM

## 2023-07-07 RX ORDER — VANCOMYCIN HYDROCHLORIDE 1 G/20ML
INJECTION, POWDER, LYOPHILIZED, FOR SOLUTION INTRAVENOUS AS NEEDED
Status: DISCONTINUED | OUTPATIENT
Start: 2023-07-07 | End: 2023-07-07 | Stop reason: HOSPADM

## 2023-07-07 RX ORDER — BUPIVACAINE HYDROCHLORIDE 2.5 MG/ML
INJECTION, SOLUTION EPIDURAL; INFILTRATION; INTRACAUDAL
Status: COMPLETED | OUTPATIENT
Start: 2023-07-07 | End: 2023-07-07

## 2023-07-07 RX ORDER — LIDOCAINE HYDROCHLORIDE 10 MG/ML
INJECTION, SOLUTION EPIDURAL; INFILTRATION; INTRACAUDAL; PERINEURAL AS NEEDED
Status: DISCONTINUED | OUTPATIENT
Start: 2023-07-07 | End: 2023-07-07

## 2023-07-07 RX ORDER — PROPOFOL 10 MG/ML
INJECTION, EMULSION INTRAVENOUS AS NEEDED
Status: DISCONTINUED | OUTPATIENT
Start: 2023-07-07 | End: 2023-07-07

## 2023-07-07 RX ORDER — CHLORHEXIDINE GLUCONATE 4 G/100ML
SOLUTION TOPICAL DAILY PRN
Status: DISCONTINUED | OUTPATIENT
Start: 2023-07-07 | End: 2023-07-07 | Stop reason: HOSPADM

## 2023-07-07 RX ORDER — FENTANYL CITRATE/PF 50 MCG/ML
25 SYRINGE (ML) INJECTION
Status: DISCONTINUED | OUTPATIENT
Start: 2023-07-07 | End: 2023-07-07 | Stop reason: HOSPADM

## 2023-07-07 RX ORDER — ONDANSETRON 2 MG/ML
4 INJECTION INTRAMUSCULAR; INTRAVENOUS ONCE AS NEEDED
Status: DISCONTINUED | OUTPATIENT
Start: 2023-07-07 | End: 2023-07-07 | Stop reason: HOSPADM

## 2023-07-07 RX ORDER — CHLORHEXIDINE GLUCONATE 0.12 MG/ML
15 RINSE ORAL ONCE
Status: COMPLETED | OUTPATIENT
Start: 2023-07-07 | End: 2023-07-07

## 2023-07-07 RX ADMIN — PROPOFOL 200 MG: 10 INJECTION, EMULSION INTRAVENOUS at 10:18

## 2023-07-07 RX ADMIN — SODIUM CHLORIDE, SODIUM LACTATE, POTASSIUM CHLORIDE, AND CALCIUM CHLORIDE: .6; .31; .03; .02 INJECTION, SOLUTION INTRAVENOUS at 10:10

## 2023-07-07 RX ADMIN — CHLORHEXIDINE GLUCONATE 0.12% ORAL RINSE 15 ML: 1.2 LIQUID ORAL at 08:41

## 2023-07-07 RX ADMIN — SUGAMMADEX 200 MG: 100 INJECTION, SOLUTION INTRAVENOUS at 11:53

## 2023-07-07 RX ADMIN — ROCURONIUM BROMIDE 50 MG: 10 INJECTION, SOLUTION INTRAVENOUS at 10:19

## 2023-07-07 RX ADMIN — ONDANSETRON 4 MG: 2 INJECTION INTRAMUSCULAR; INTRAVENOUS at 11:38

## 2023-07-07 RX ADMIN — LIDOCAINE HYDROCHLORIDE 50 MG: 10 INJECTION, SOLUTION EPIDURAL; INFILTRATION; INTRACAUDAL; PERINEURAL at 10:18

## 2023-07-07 RX ADMIN — FENTANYL CITRATE 50 MCG: 50 INJECTION, SOLUTION INTRAMUSCULAR; INTRAVENOUS at 09:42

## 2023-07-07 RX ADMIN — PHENYLEPHRINE HYDROCHLORIDE 20 MCG/MIN: 10 INJECTION INTRAVENOUS at 10:55

## 2023-07-07 RX ADMIN — BUPIVACAINE HYDROCHLORIDE 25 ML: 2.5 INJECTION, SOLUTION EPIDURAL; INFILTRATION; INTRACAUDAL at 09:47

## 2023-07-07 RX ADMIN — FENTANYL CITRATE 50 MCG: 50 INJECTION, SOLUTION INTRAMUSCULAR; INTRAVENOUS at 11:16

## 2023-07-07 RX ADMIN — SODIUM CHLORIDE, SODIUM LACTATE, POTASSIUM CHLORIDE, AND CALCIUM CHLORIDE: .6; .31; .03; .02 INJECTION, SOLUTION INTRAVENOUS at 11:25

## 2023-07-07 RX ADMIN — CEFAZOLIN 3000 MG: 1 INJECTION, POWDER, FOR SOLUTION INTRAMUSCULAR; INTRAVENOUS at 10:21

## 2023-07-07 RX ADMIN — MIDAZOLAM HYDROCHLORIDE 2 MG: 1 INJECTION, SOLUTION INTRAMUSCULAR; INTRAVENOUS at 09:42

## 2023-07-07 RX ADMIN — PROPOFOL 50 MG: 10 INJECTION, EMULSION INTRAVENOUS at 10:20

## 2023-07-07 RX ADMIN — DEXAMETHASONE SODIUM PHOSPHATE 10 MG: 10 INJECTION INTRAMUSCULAR; INTRAVENOUS at 10:30

## 2023-07-07 NOTE — ANESTHESIA PROCEDURE NOTES
Peripheral Block    Patient location during procedure: pre-op  Start time: 7/7/2023 9:47 AM  Reason for block: at surgeon's request and post-op pain management  Staffing  Performed by: Alicia Cooley MD  Authorized by: Alicia Cooley MD    Preanesthetic Checklist  Completed: patient identified, IV checked, site marked, risks and benefits discussed, surgical consent, monitors and equipment checked, pre-op evaluation and timeout performed  Peripheral Block  Patient position: supine  Prep: ChloraPrep  Patient monitoring: heart rate, continuous pulse ox and frequent blood pressure checks  Block type: supraclavicular  Laterality: right  Injection technique: single-shot  Procedures: ultrasound guided, Ultrasound guidance required for the procedure to increase accuracy and safety of medication placement and decrease risk of complications.   Ultrasound permanent image savedbupivacaine (PF) (MARCAINE) 0.25 % 10 mL - Perineural   25 mL - 7/7/2023 9:47:00 AM  midazolam (VERSED) 2 mg/2 mL - Intravenous   2 mg - 7/7/2023 9:42:00 AM  fentaNYL 50 mcg/mL - Intravenous   50 mcg - 7/7/2023 9:42:00 AM  Needle  Needle type: Stimuplex   Needle gauge: 22 G  Needle length: 4 in  Needle localization: ultrasound guidance  Assessment  Injection assessment: incremental injection, local visualized surrounding nerve on ultrasound, negative aspiration for heme and no paresthesia on injection  Paresthesia pain: none  Heart rate change: no  Slow fractionated injection: yes  Post-procedure:  site cleaned  patient tolerated the procedure well with no immediate complications

## 2023-07-07 NOTE — INTERVAL H&P NOTE
H&P reviewed. After examining the patient I find no changes in the patients condition since the H&P had been written.     Vitals:    07/07/23 0835   BP: 151/86   Pulse: 59   Resp: 17   Temp: (!) 97.1 °F (36.2 °C)   SpO2: 97%     Plan for right elbow lateral collateral ligament repair today

## 2023-07-07 NOTE — ANESTHESIA PREPROCEDURE EVALUATION
Procedure:  RECONSTRUCTION LIGAMENT ELBOW/ liagment repair with internal bracing and augmentation and all necessary procedures (Right: Elbow)    Relevant Problems   CARDIO   (+) Mixed hyperlipidemia      GI/HEPATIC   (+) GERD (gastroesophageal reflux disease)      /RENAL   (+) BPH (benign prostatic hyperplasia)      MUSCULOSKELETAL   (+) Psoriatic arthritis (HCC)        Physical Exam    Airway    Mallampati score: III  TM Distance: >3 FB  Neck ROM: full     Dental   No notable dental hx     Cardiovascular  Cardiovascular exam normal    Pulmonary  Pulmonary exam normal     Other Findings        Anesthesia Plan  ASA Score- 3     Anesthesia Type- general with ASA Monitors. Additional Monitors:   Airway Plan: ETT. Plan Factors-Exercise tolerance (METS): >4 METS. Chart reviewed. EKG reviewed. Existing labs reviewed. Patient summary reviewed. Patient is not a current smoker. Induction- intravenous. Postoperative Plan- Plan for postoperative opioid use. Informed Consent- Anesthetic plan and risks discussed with patient. I personally reviewed this patient with the CRNA. Discussed and agreed on the Anesthesia Plan with the CRNA. Loretta Brooms

## 2023-07-07 NOTE — ANESTHESIA POSTPROCEDURE EVALUATION
Post-Op Assessment Note    CV Status:  Stable    Pain management: adequate     Mental Status:  Alert and awake   Hydration Status:  Euvolemic   PONV Controlled:  Controlled   Airway Patency:  Patent   Two or more mitigation strategies used for obstructive sleep apnea   Post Op Vitals Reviewed: Yes      Staff: CRNA         No notable events documented.     BP   132/61   Temp  97.8   Pulse  65   Resp   16   SpO2   100%

## 2023-07-07 NOTE — DISCHARGE INSTR - AVS FIRST PAGE
Tony Barraza DO    Orthopedic Surgery, Shoulder/Elbow and Sports Medicine  [facility]   [address], [address] [facility]  Phone: [phone]    General Post-op Surgical Instructions:    Date of Procedure - 07/07/23     Procedure - Right lateral collateral ligament repair with internal brace    Weight Bearing Status -nonweightbearing right upper extremity    DVT Prophylaxis and Duration -not required    PT/OT Instructions -to be discussed at first postop visit    Stitches/Staples - Will be removed at 7-10 days postop; we will then place steristrips on incision site    Wound Care -maintain dressing and splint. Keep clean and dry. Do not remove. Xray follow up - to be done at or prior to office visit follow-up if indicated    Additional Info - Please complete your course of antibiotics     Any questions or concerns please call [phone] please!

## 2023-07-07 NOTE — OP NOTE
OPERATIVE REPORT  PATIENT NAME: Michelle Belle    :  1970  MRN: [id number]  Pt Location: [facility] OR ROOM 01    SURGERY DATE: 2023    Surgeon(s) and Role:     * Tony Barraza - Primary     * Tereza Lowry PA-C - Assisting    Preop Diagnosis:  Complete tear of lateral collateral ligament of right elbow [S53.431A]  Preoperative testing [Z01.818]    Post-Op Diagnosis Codes:     * Complete tear of lateral collateral ligament of right elbow [S53.431A]     * Preoperative testing [Z01.818]    Procedure(s):  Right - LATERAL COLLATERAL LIGAMENT REPAIR WITH INTERNAL BRACE AUGMENTATION    Specimen(s):  * No specimens in log *    Estimated Blood Loss:   Minimal    Drains:  * No LDAs found *    Anesthesia Type:   General    Operative Indications:  Complete tear of lateral collateral ligament of right elbow [S53.431A]  Preoperative testing [Z01.818]      Operative Findings:  Complete tear lateral collateral ligament    Complications:   None    Procedure and Technique:  The patient was seen in the preoperative holding area with operative extremity was marked. He was taken to operative room and placed in the supine position. His right upper extremities prepped and draped in usual sterile fashion. A timeout was called and patient was administered Ancef 3 g IV prior to incision. Using a 15 blade knife incision was made over the lateral epicondyle. Subcu dissection was taken down to the fascia. A Kocher approach was performed between the interval of the ECU and anconeus. The supinator crest was identified and palpated. A guidepin was placed at this position just distal to the insertion of the LCL. A cannulated drill was then used to drill a bone tunnel. A 4.75 swivel lock tap was then used. A 4.75 swivel lock anchor preloaded with fiber tape was then inserted at the supinator crest.  The lateral, ligament was further identified up to the lateral epicondyle. It was found to be torn off the lateral epicondyle.   A #2 FiberWire was used to [facility] stitch to the lateral collateral ligament complex. The isometric point on the lateral epicondyle was identified under fluoroscopic imaging with a K wire. A cannulated drill was then used to drill a bone tunnel at this position. A 4.75 tap was then used to tap the bone tunnel. The fiber tape and the FiberWire was then loaded and inserted at the isometric point with a 4.75 swivel lock anchor with the arm at approximately 60 degrees of flexion with neutral rotation. The remaining common extensor tendon and remaining lateral collateral ligament complex was further repaired down to the lateral epicondyle with the sutures from the 4.75 swivel lock anchor. Range of motion demonstrated that the internal brace construct and repair was isometric with full extension and flexion. The fascia over the Kocher interval was closed with 0 Vicryl. The wound was copiously irrigated. Vancomycin powder was placed deep to the wound. The skin was closed with 2-0 and 3-0 Monocryl. Exofin was placed. Dressings include Mepilex dressing. Ace bandage dressing was placed. The patient was placed in regular sling. There were no complications throughout case. I was present for the entire procedure., A qualified resident physician was not available. and A physician assistant was required during the procedure for retraction, tissue handling, dissection and suturing.     Patient Disposition:  PACU         SIGNATURE: Tony Barraza  DATE: July 7, 2023  TIME: 12:13 PM

## 2023-07-13 ENCOUNTER — HOSPITAL ENCOUNTER (OUTPATIENT)
Dept: RADIOLOGY | Facility: HOSPITAL | Age: 53
End: 2023-07-13
Payer: COMMERCIAL

## 2023-07-13 ENCOUNTER — OFFICE VISIT (OUTPATIENT)
Dept: OBGYN CLINIC | Facility: CLINIC | Age: 53
End: 2023-07-13

## 2023-07-13 VITALS
SYSTOLIC BLOOD PRESSURE: 128 MMHG | HEART RATE: 85 BPM | HEIGHT: 69 IN | BODY MASS INDEX: 41.62 KG/M2 | DIASTOLIC BLOOD PRESSURE: 90 MMHG | WEIGHT: 281 LBS

## 2023-07-13 DIAGNOSIS — Z98.890 POST-OPERATIVE STATE: Primary | ICD-10-CM

## 2023-07-13 DIAGNOSIS — S53.401D SPRAIN OF RIGHT ELBOW, SUBSEQUENT ENCOUNTER: ICD-10-CM

## 2023-07-13 DIAGNOSIS — S53.401D SPRAIN OF RIGHT ELBOW, SUBSEQUENT ENCOUNTER: Primary | ICD-10-CM

## 2023-07-13 DIAGNOSIS — Z98.890 POST-OPERATIVE STATE: ICD-10-CM

## 2023-07-13 PROBLEM — S53.401A SPRAIN OF RIGHT ELBOW: Status: ACTIVE | Noted: 2023-07-13

## 2023-07-13 PROCEDURE — 73080 X-RAY EXAM OF ELBOW: CPT

## 2023-07-13 PROCEDURE — 99024 POSTOP FOLLOW-UP VISIT: CPT | Performed by: PHYSICIAN ASSISTANT

## 2023-07-13 NOTE — PROGRESS NOTES
Assessment:   S/P Lateral Collateral Ligament Repair With Internal Brace Augmentation - Right on 7/7/2023    Plan:   IROM brace, open  Start PT for ROM - no valgus stress for 6 weeks    Follow Up:  Dr. Radha Jones in 4 weeks    To Do Next Visit:         CHIEF COMPLAINT:  Chief Complaint   Patient presents with   • Right Elbow - Post-op         SUBJECTIVE:  Ivy Arguelles is a 45 y.o. male who presents for follow up after Lateral Collateral Ligament Repair With Internal Brace Augmentation - Right on 7/7/2023. Today patient has No Complaints. PHYSICAL EXAMINATION:  Vital signs: /90   Pulse 85   Ht 5' 9" (1.753 m) Comment: verbal  Wt 127 kg (281 lb) Comment: verbal  BMI 41.50 kg/m²   General: well developed and well nourished, alert, oriented times 3 and appears comfortable  Psychiatric: Normal    MUSCULOSKELETAL EXAMINATION:  Incision: Clean, dry, intact  Range of Motion: As expected  Neurovascular status: Neuro intact, good cap refill  Done today: Sutures clipped. Mepilex removed.       STUDIES REVIEWED:  Xrays no acute displaced fracture      PROCEDURES PERFORMED:  Procedures  No Procedures performed today

## 2023-07-18 ENCOUNTER — EVALUATION (OUTPATIENT)
Dept: OCCUPATIONAL THERAPY | Facility: CLINIC | Age: 53
End: 2023-07-18
Payer: COMMERCIAL

## 2023-07-18 DIAGNOSIS — Z98.890 POST-OPERATIVE STATE: ICD-10-CM

## 2023-07-18 DIAGNOSIS — S53.401D SPRAIN OF RIGHT ELBOW, SUBSEQUENT ENCOUNTER: Primary | ICD-10-CM

## 2023-07-18 PROCEDURE — 97166 OT EVAL MOD COMPLEX 45 MIN: CPT

## 2023-07-18 NOTE — PROGRESS NOTES
OT Evaluation     Today's date: 2023  Patient name: Naty Bejarano  : 1970  MRN: [id number]  Referring provider: Lorene De Paz  Dx:   Encounter Diagnosis     ICD-10-CM    1. Post-operative state  Y74.274 Ambulatory Referral to Occupational Therapy      2. Sprain of right elbow, subsequent encounter  S53.401D Ambulatory Referral to Occupational Therapy                     Assessment  Impairments: abnormal or restricted ROM, impaired physical strength, lacks appropriate home exercise program and pain with function    Goals  Short term goals 2-4 weeks    1)Establish HEP to enhance performance with ADLs. 2)Improve active range of motion of right elbow by 20  to assist with UE dressing. 3)Improve active range of motion of right wrist by 10  to assist with UE dressing. 4)Achieve composite digital flexion to touch distal bullock crease for grasp on utensils. 5)Achieve appropriate wound closure in 10 - 14 days post op as evidenced by lack of infection. Long Term goals by discharge  1)Establish final home exercise program to enhance maximal functional level with ADLs. 2)Achieve functional active range of motion of right upper extremity for full return to household chores. 3) Achieve functional strength of right upper extremity for full return to high level ADLs.                  Plan  Patient would benefit from: custom splinting, OT eval and skilled occupational therapy  Planned modality interventions: cryotherapy, TENS, thermotherapy: hydrocollator packs, ultrasound and unattended electrical stimulation  Planned therapy interventions: dressing changes, fine motor coordination training, flexibility, functional ROM exercises, graded activity, graded exercise, home exercise program, therapeutic exercise, therapeutic training, therapeutic activities, stretching, strengthening, patient education, orthotic management and training, manual therapy and joint mobilization  Frequency: 2x week  Duration in weeks: 12  Plan of Care beginning date: 2023  Plan of Care expiration date: 10/10/2023  Treatment plan discussed with: patient        Subjective Evaluation    History of Present Illness  Date of surgery: 2023  Mechanism of injury: Patient reports to have felt some discomfort in his elbow  and while attempting to extend the elbow patient reports to have felt uncomfortable sensation. Patient went to see Dr. Andrew Alvarenga and was diagosed with possible bicep tednon tear. Patient had Elbow MRI on 23 which showed complete tear of lateral collateral ligaement. Patient went to see Dr. Wilber Gomez on  for evaluation and discussed surigcal option. Surgery was performed by Dr. Wilber Gomez on . Patient was seen for surgical follow up on  and was provided with hinge brace. Patient was referred by Janak Sims PA-C to initiate therapy services. Patient Goals  Patient goals for therapy: decreased edema, decreased pain, increased motion and increased strength    Pain  Current pain ratin  At best pain ratin  At worst pain ratin  Quality: sharp    Hand dominance: right          Objective     Active Range of Motion     Left Elbow   Flexion: 130 degrees   Extension: 0 degrees   Forearm supination: 90 degrees   Forearm pronation: 90 degrees     Right Elbow   Flexion: 115 degrees   Extension: -10 degrees   Forearm supination: 35 degrees   Forearm pronation: 80 degrees     Left Wrist   Wrist flexion: 70 degrees   Wrist extension: 75 degrees   Radial deviation: 15 degrees   Ulnar deviation: 30 degrees     Right Wrist   Wrist flexion: 50 degrees   Wrist extension: 65 degrees   Radial deviation: 15 degrees   Ulnar deviation: 30 degrees     Right Thumb   Opposition: 1 cm from [facility]     Additional Active Range of Motion Details  To be assessed at appropriate time. Strength/Myotome Testing     Additional Strength Details  To be assessed at appropriate time.      Swelling   Left Elbow Girth Measurements Joint line: 31 cm    Right Elbow Girth Measurements   Joint line: 35 cm    Left Wrist/Hand   Circumference MCP: 22.5 cm  Circumference wrist: 20 cm    Right Wrist/Hand   Circumference MCP: 23.5 cm  Circumference wrist: 21 cm               Auth Tracker  Auth Status Total   Visits  Expiration date Co-Insurance   pending                                  Visit Tracker: \[facility]"" [facility]  Date 7/18  IE                                                                                            PMHx:   has a past medical history of GERD (gastroesophageal reflux disease), Hyperlipidemia, Hypertension, and Psoriatic arthritis ([address] Precautions:     Immediate Post-op (0-2 weeks):   • Posterior Elbow Orthosis or Locked Hinged elbow orthosis    • Forearm in pronation (in neutral if MCL involved). • Manage edema and mobility of shoulder and wrist/fingers.   Precautions:   o Maintain pronation (or neutral) for first 4 weeks   o NO elbow extension with forearm in supination for up to 6-8   weeks   o Gentle forearm pronation/supination ROM allowed if elbow is    flexed 90 degrees   o Avoid varus positions on the elbow   o No excessive flexion for 8-12 weeks      AROM to Strength: Intermediate Phase:Week 3-6     o Avoid any varus load or positions   o Begin with AROM/AAROM/PROM in safe position   o Multiplanar isometrics to elbow and forearm in protected           position   o Slowly progress elbow strength avoiding full extension, flexion      or supination      Advanced Phase:  Week 6/8 -12   o Begin throwers 10 at week 6   o Gently explore full supination and extension (maybe not until                 week 12-16)   O Eccentric & plyometric exercises at week 10+      Return to Sport: Week 16   • May return to full duty when injured UE is =85% of contralateral               side   o May use Brace at work or when competing to avoid hyper ext                or varus load             Manuals HEP 7/18/2023                       Ther Ex Education on HEP and dx  x5min   AROM tendon glides x x10   AROM digit add/abduction x x10   AROM wrist flex/ext/RD/UD x x10   AROM forearm rotation x X 10 in elbow flexion    AROM elbow flex/ext x x10         Ther Act                     Modalities     MHP  5 min           Assessment:   Patient tolerated session well. Upon assessment today patient presents with swelling and decreased range of motion . Patient session focused on patient education on anatomy and physiology concerning current dx, techniques for decreasing deficits through HEP, and appropriate use of modalities. Patient educated on HEP to include wrist active range of motion exercises and tendon glides with verbal instructions and handouts for patient reference. Patient educated on surgical precautions including avoidance of varus loading positions, maintaining safe position of forearm pronation and avoiding lifting heavy objects. Patient educated on treatment plan at this time. Patient benefiting from skilled hand therapy OT to reduce deficits to improve independence with daily activities . Plan:   Focus on range of motion and strengthening to improve ability to complete daily activites with ease.   POC 7/18/23-10/10/23

## 2023-07-19 NOTE — PROGRESS NOTES
Daily Note     Today's date: 2023  Patient name: Kei Joy  : 1970  MRN: [id number]  Referring provider: Kerri Luis PA-C  Dx:   Encounter Diagnosis     ICD-10-CM    1. Post-operative state  Z98.890       2. Sprain of right elbow, subsequent encounter  S53.401D           Start Time: 1100  Stop Time: 1145  Total time in clinic (min): 45 minutes    Subjective: Patient reports noted improvement in swelling and increased pain free range of motion. Patient additionally reports increased awareness of surgical precautions. Objective: See treatment diary below           Auth Tracker  Auth Status Total   Visits  Expiration date Co-Insurance   pending 12 10/18                                Visit Tracker: [facility]  Date   IE                                                                                            PMHx:   has a past medical history of GERD (gastroesophageal reflux disease), Hyperlipidemia, Hypertension, and Psoriatic arthritis ([address] Precautions:     Immediate Post-op (0-2 weeks):   • Posterior Elbow Orthosis or Locked Hinged elbow orthosis    • Forearm in pronation (in neutral if MCL involved). • Manage edema and mobility of shoulder and wrist/fingers.   Precautions:   o Maintain pronation (or neutral) for first 4 weeks   o NO elbow extension with forearm in supination for up to 6-8   weeks   o Gentle forearm pronation/supination ROM allowed if elbow is    flexed 90 degrees   o Avoid varus positions on the elbow   o No excessive flexion for 8-12 weeks      AROM to Strength: Intermediate Phase:Week 3-6     o Avoid any varus load or positions   o Begin with AROM/AAROM/PROM in safe position   o Multiplanar isometrics to elbow and forearm in protected           position   o Slowly progress elbow strength avoiding full extension, flexion      or supination      Advanced Phase:  Week 6/8 -12   o Begin throwers 10 at week 6   o Gently explore full supination and extension (maybe not until                 week 12-16)   O Eccentric & plyometric exercises at week 10+      Return to Sport: Week 16   • May return to full duty when injured UE is =85% of contralateral               side   o May use Brace at work or when competing to avoid hyper ext                or varus load             Manuals HEP 7/18/2023   Edema mobilization      PROM                Ther Ex     Education on HEP and dx  x5min   AROM digit add/abduction x x10   AROM wrist flex/ext/RD/UD x x10   AROM forearm rotation x X 10 in elbow flexion     AROM elbow flex/ext x x20 with dowel in supine with forearm in pronation    AROM shoulder  Dowel flexion/extension    Ther Act                     Modalities     MHP  5 min           Assessment:   Manual tx performed with focus on edema mobilization to reduce swelling of right upper extremity. Passive range performed to maintain joint range of motion. Patient tolerated session well. Session focused on H range of motion, and patient education to improve functional task performance with daily activities. Patient tolerated all activity with no complaints. Patient progressing well towards goals. Patient benefiting from skilled hand therapy OT to reduce deficits to improve independence with daily activities. Plan:   Focus on range of motion and strengthening to improve ability to complete daily activites with ease.   POC 7/18/23-10/10/23

## 2023-07-20 ENCOUNTER — OFFICE VISIT (OUTPATIENT)
Dept: OCCUPATIONAL THERAPY | Facility: CLINIC | Age: 53
End: 2023-07-20
Payer: COMMERCIAL

## 2023-07-20 DIAGNOSIS — Z98.890 POST-OPERATIVE STATE: Primary | ICD-10-CM

## 2023-07-20 DIAGNOSIS — S53.401D SPRAIN OF RIGHT ELBOW, SUBSEQUENT ENCOUNTER: ICD-10-CM

## 2023-07-20 PROCEDURE — 97110 THERAPEUTIC EXERCISES: CPT

## 2023-07-20 PROCEDURE — 97140 MANUAL THERAPY 1/> REGIONS: CPT

## 2023-07-22 NOTE — PROGRESS NOTES
Daily Note     Today's date: 2023  Patient name: Kei Joy  : 1970  MRN: [id number]  Referring provider: Kerri Luis PA-C  Dx:   Encounter Diagnosis     ICD-10-CM    1. Post-operative state  Z98.890       2. Sprain of right elbow, subsequent encounter  S53.401D                      Subjective: Patient reports continued compliance with surgical restrictions stating "kareen been trying to be cautious of what I do"       Objective: See treatment diary below           Auth Tracker  Auth Status Total   Visits  Expiration date Co-Insurance   pending 12 10/18                                Visit Tracker: [facility]  Date   IE                                                                                           PMHx:   has a past medical history of GERD (gastroesophageal reflux disease), Hyperlipidemia, Hypertension, and Psoriatic arthritis ([address] Precautions:  Surgery performed on : 3 weeks as of      Immediate Post-op (0-2 weeks):   • Posterior Elbow Orthosis or Locked Hinged elbow orthosis    • Forearm in pronation (in neutral if MCL involved). • Manage edema and mobility of shoulder and wrist/fingers.   Precautions:   o Maintain pronation (or neutral) for first 4 weeks   o NO elbow extension with forearm in supination for up to 6-8  weeks   o Gentle forearm pronation/supination ROM allowed if elbow is flexed 90                   degrees   o Avoid varus positions on the elbow   o No excessive flexion for 8-12 weeks      AROM to Strength: Intermediate Phase:Week 3-6   o Avoid any varus load or positions   o Begin with AROM/AAROM/PROM in safe position   o Multiplanar isometrics to elbow and forearm in protected           position   o Slowly progress elbow strength avoiding full extension, flexion or supination      Advanced Phase:  Week 6/8 -12   o Begin throwers 10 at week 6   o Gently explore full supination and extension (maybe not until                  week 12-16)   O Eccentric & plyometric exercises at week 10+      Return to Sport: Week 16   • May return to full duty when injured UE is =85% of contralateral                side   o May use Brace at work or when competing to avoid hyper ext                or  varus load             Manuals HEP 7/24/2023    Edema mobilization      PROM                Ther Ex     Education on HEP and dx  x5min   Wound Debridement   x5min    AROM digit add/abduction x x10   AROM wrist flex/ext/RD/UD x x10   AROM forearm rotation x X 10 in elbow flexion     AROM elbow flex/ext x x20 with dowel in supine with forearm in pronation    AROM shoulder  Dowel flexion/extension         Ther Act                     Modalities     MHP  5 min           Assessment:   Patient presents with decreased swelling and continued improvement in wound healing. Wound care performed consisting of debridement of tissue. Manual tx performed with focus on edema mobilization to reduce swelling of right upper extremity. Passive range performed to maintain joint range of motion. Patient tolerated session well. Session focused on H range of motion, and patient education to improve functional task performance with daily activities. Patient tolerated all activity with no complaints. Patient progressing well towards goals. Patient benefiting from skilled hand therapy OT to reduce deficits to improve independence with daily activities. Plan:   Focus on range of motion and strengthening to improve ability to complete daily activites with ease.   POC 7/18/23-10/10/23

## 2023-07-24 ENCOUNTER — OFFICE VISIT (OUTPATIENT)
Dept: OCCUPATIONAL THERAPY | Facility: CLINIC | Age: 53
End: 2023-07-24
Payer: COMMERCIAL

## 2023-07-24 DIAGNOSIS — Z98.890 POST-OPERATIVE STATE: Primary | ICD-10-CM

## 2023-07-24 DIAGNOSIS — S53.401D SPRAIN OF RIGHT ELBOW, SUBSEQUENT ENCOUNTER: ICD-10-CM

## 2023-07-24 PROCEDURE — 97140 MANUAL THERAPY 1/> REGIONS: CPT

## 2023-07-24 PROCEDURE — 97110 THERAPEUTIC EXERCISES: CPT

## 2023-07-26 NOTE — PROGRESS NOTES
Daily Note     Today's date: 2023  Patient name: Ricarda Guevara  : 1970  MRN: [id number]  Referring provider: Judson Cabral PA-C  Dx:   Encounter Diagnosis     ICD-10-CM    1. Post-operative state  Z98.890       2. Sprain of right elbow, subsequent encounter  S53.401D                      Subjective: Patient reports compliance with surgical precautions. Patient expressed concerns regarding clicking sensation at surgical site. Patient reports clicking does not happen consistently but will occasionally occur with movement. Objective: See treatment diary below. Surgical site continues to heal with edges approximated. Minimal scabbing present. No sign of infection. Auth Tracker  Auth Status Total   Visits  Expiration date Co-Insurance   pending 12 10/18                                Visit Tracker: Karyna Rack  Date   IE                                                                                          PMHx:   has a past medical history of GERD (gastroesophageal reflux disease), Hyperlipidemia, Hypertension, and Psoriatic arthritis ([address] Precautions:  Surgery performed on : 3 weeks as of      Immediate Post-op (0-2 weeks):   • Posterior Elbow Orthosis or Locked Hinged elbow orthosis    • Forearm in pronation (in neutral if MCL involved). • Manage edema and mobility of shoulder and wrist/fingers.   Precautions:   o Maintain pronation (or neutral) for first 4 weeks   o NO elbow extension with forearm in supination for up to 6-8  weeks   o Gentle forearm pronation/supination ROM allowed if elbow is flexed 90  degrees   o Avoid varus positions on the elbow   o No excessive flexion for 8-12 weeks      AROM to Strength: Intermediate Phase:Week 3-6   o Avoid any varus load or positions   o Begin with AROM/AAROM/PROM in safe position   o Multiplanar isometrics to elbow and forearm in protected   position   o Slowly progress elbow strength avoiding full extension, flexion or supination      Advanced Phase:  Week 6/8 -12   o Begin throwers 10 at week 6   o Gently explore full supination and extension (maybe not until week 12-16)   O Eccentric & plyometric exercises at week 10+      Return to Sport: Week 16   • May return to full duty when injured UE is =85% of contralateral  side   o May use Brace at work or when competing to avoid hyper ext or  varus load             Manuals HEP 7/27/2023    Edema mobilization      PROM                Ther Ex     Education on HEP and dx  x5min   Wound Debridement   x5min    AROM digit add/abduction x x10   AROM wrist flex/ext/RD/UD x x10   AROM forearm rotation x X 10 in elbow flexion     AROM elbow flex/ext x x20 with dowel in supine with forearm in pronation    AROM shoulder  Dowel flexion/extension    Isometrics elbow   Flex/ext   Isometrics shoulder  Add/abd    Isometrics wrist   Flex/ext   Ther Act                     Modalities     MHP  5 min           Assessment:   Patient presents with continued decreased swelling and continued wound healing. Wound care performed consisting of debridement of tissue. Manual tx performed with focus on edema mobilization to reduce swelling of right upper extremity. Passive range performed to maintain joint range of motion. Patient tolerated session well. Session focused onrange of motion, and patient education to improve functional task performance with daily activities. Isometric exercises initiated at today's session. Patient tolerated all activity with no complaints. Patient progressing well towards goals. Patient benefiting from skilled hand therapy OT to reduce deficits to improve independence with daily activities. Plan:   Focus on range of motion and strengthening to improve ability to complete daily activites with ease.   POC 7/18/23-10/10/23

## 2023-07-27 ENCOUNTER — OFFICE VISIT (OUTPATIENT)
Dept: OCCUPATIONAL THERAPY | Facility: CLINIC | Age: 53
End: 2023-07-27
Payer: COMMERCIAL

## 2023-07-27 DIAGNOSIS — S53.401D SPRAIN OF RIGHT ELBOW, SUBSEQUENT ENCOUNTER: ICD-10-CM

## 2023-07-27 DIAGNOSIS — Z98.890 POST-OPERATIVE STATE: Primary | ICD-10-CM

## 2023-07-27 PROCEDURE — 97140 MANUAL THERAPY 1/> REGIONS: CPT

## 2023-07-27 PROCEDURE — 97110 THERAPEUTIC EXERCISES: CPT

## 2023-07-28 NOTE — PROGRESS NOTES
Daily Note     Today's date: 2023  Patient name: Aldo Bee  : 1970  MRN: [id number]  Referring provider: Carey Hannah  Dx:   Encounter Diagnosis     ICD-10-CM    1. Sprain of right elbow, subsequent encounter  S53.401D       2. Post-operative state  Z98.890                      Subjective: Patient reports continued  presence of clicking sensation at lateral epicondyle near surgical repair. Patient reports tightness and slight burning sensation. Patient reports to have gone swimming in [place] over the past weekend. Objective: See treatment diary below. Surgical site continues to heal with edges approximated however some open wound with scabbing present. Slight redness and warmth to area noted. Auth Tracker  Auth Status Total   Visits  Expiration date Co-Insurance   pending 12 10/18                                [facility]  Date   IE                                                                                         PMHx:   has a past medical history of GERD (gastroesophageal reflux disease), Hyperlipidemia, Hypertension, and Psoriatic arthritis ([address] Precautions:  Surgery performed on : 3 weeks as of      Immediate Post-op (0-2 weeks):   • Posterior Elbow Orthosis or Locked Hinged elbow orthosis    • Forearm in pronation (in neutral if MCL involved). • Manage edema and mobility of shoulder and wrist/fingers.   Precautions:   o Maintain pronation (or neutral) for first 4 weeks   o NO elbow extension with forearm in supination for up to 6-8  weeks   o Gentle forearm pronation/supination ROM allowed if elbow is flexed 90  degrees   o Avoid varus positions on the elbow   o No excessive flexion for 8-12 weeks      AROM to Strength: Intermediate Phase:Week 3-6   o Avoid any varus load or positions   o Begin with AROM/AAROM/PROM in safe position   o Multiplanar isometrics to elbow and forearm in protected   position   o Slowly progress elbow strength avoiding full extension, flexion  or supination      Advanced Phase:  Week 6/8 -12   o Begin throwers 10 at week 6   o Gently explore full supination and extension (maybe not until week 12-16)   O Eccentric & plyometric exercises at week 10+      Return to Sport: Week 16   • May return to full duty when injured UE is =85% of contralateral  side   o May use Brace at work or when competing to avoid hyper ext or  varus load             Manuals HEP 7/31/2023    Edema mobilization      PROM                Ther Ex     Education on HEP and dx  x5min   Wound Debridement   x5min    AROM digit add/abduction x x10   AROM wrist flex/ext/RD/UD x x10   AROM forearm rotation x X 10 in elbow flexion     AROM elbow flex/ext x x20 with dowel in supine with forearm in pronation    AROM shoulder  Dowel flexion/extension    Isometrics elbow   Flex/ext   Isometrics shoulder  Add/abd    Isometrics wrist   Flex/ext   Ther Act                     Modalities     MHP  5 min           Assessment:   Patient presents with continued decreased swelling and continued wound healing. Wound care performed consisting of debridement of tissue. Manual tx performed with focus on edema mobilization to reduce swelling of right upper extremity. Passive range performed to maintain joint range of motion. Patient tolerated session well. Session focused onrange of motion, and patient education to improve functional task performance with daily activities. Isometric exercises initiated at today's session. Patient tolerated all activity with no complaints. Patient was advised to avoid submerging wound in water due to areas that are open and to avoid unsanitary herrera with open wound. Patient was educated on sign of infection and to report to urgent care if infection is suspected. Therapy services to observe incision sight next visit. Patient progressing well towards goals.  Patient benefiting from skilled hand therapy OT to reduce deficits to improve independence with daily activities. Plan:   Focus on range of motion and strengthening to improve ability to complete daily activites with ease.   POC 7/18/23-10/10/23

## 2023-07-31 ENCOUNTER — OFFICE VISIT (OUTPATIENT)
Dept: OCCUPATIONAL THERAPY | Facility: CLINIC | Age: 53
End: 2023-07-31
Payer: COMMERCIAL

## 2023-07-31 DIAGNOSIS — Z98.890 POST-OPERATIVE STATE: ICD-10-CM

## 2023-07-31 DIAGNOSIS — S53.401D SPRAIN OF RIGHT ELBOW, SUBSEQUENT ENCOUNTER: Primary | ICD-10-CM

## 2023-07-31 PROCEDURE — 97140 MANUAL THERAPY 1/> REGIONS: CPT

## 2023-07-31 PROCEDURE — 97110 THERAPEUTIC EXERCISES: CPT

## 2023-08-02 NOTE — PROGRESS NOTES
Daily Note     Today's date: 8/3/2023  Patient name: Iris Muhammad  : 1970  MRN: [id number]  Referring provider: Orlin Alva PA-C  Dx:   Encounter Diagnosis     ICD-10-CM    1. Post-operative state  Z98.890       2. Sprain of right elbow, subsequent encounter  S53.401D                      Subjective: Patient reports continued clicking sensation at lateral epicondyle. Patient additionally reports to be seeing referring physician regarding follow up after todays visit. Objective: See treatment diary below. Continued presence of slight redness and warmth to area near incision site. Presence of slight drainage when palpating area. Findings are minimal and do not warrant immediate concern regarding infection however, patient was advised to report concern to doctor at follow up visit today. Auth Tracker  Auth Status Total   Visits  Expiration date Co-Insurance   pending 12 10/18                                Visit Tracker: L[employer] Telle[employer]  Date   IE 7/20 7/24 7/27 7/31 8/3                                                                                       PMHx:   has a past medical history of GERD (gastroesophageal reflux disease), Hyperlipidemia, Hypertension, and Psoriatic arthritis ([address] Precautions:  Surgery performed on : 4 weeks as of     Immediate Post-op (0-2 weeks):   • Posterior Elbow Orthosis or Locked Hinged elbow orthosis    • Forearm in pronation (in neutral if MCL involved). • Manage edema and mobility of shoulder and wrist/fingers.   Precautions:   o Maintain pronation (or neutral) for first 4 weeks   o NO elbow extension with forearm in supination for up to 6-8       weeks   o Gentle forearm pronation/supination ROM allowed if elbow is      flexed 90  degrees   o Avoid varus positions on the elbow   o No excessive flexion for 8-12 weeks      AROM to Strength: Intermediate Phase:Week 3-6   o Avoid any varus load or positions   o Begin with AROM/AAROM/PROM in safe position   o Multiplanar isometrics to elbow and forearm in protected   position   o Slowly progress elbow strength avoiding full extension, flexion  or supination      Advanced Phase:  Week 6/8 -12   o Begin throwers 10 at week 6   o Gently explore full supination and extension (maybe not until week 12-16)   O Eccentric & plyometric exercises at week 10+      Return to Sport: Week 16   • May return to full duty when injured UE is =85% of contralateral  side   o May use Brace at work or when competing to avoid hyper ext or  varus load             Manuals HEP 8/3/2023    Edema mobilization      PROM                Ther Ex     Education on HEP and dx  x5min   Wound Debridement   x5min    AROM digit add/abduction x x10   AROM wrist flex/ext/RD/UD x x10   AROM forearm rotation x X 10 in elbow flexion     AROM elbow flex/ext x x20 with dowel in supine with forearm in pronation    AROM shoulder  Dowel flexion/extension    Isometrics elbow   Flex/ext   Isometrics shoulder  Add/abd    Isometrics wrist   Flex/ext   Ther Act                     Modalities     MHP  5 min           Assessment:   Patient presents with continued wound healing with incision site mostly healed and presence of scaring. Small opening of incision site continued to be noted with slight redness and warmth around area. Upon palpation presence of minimal drainage noted. Presence of findings are minimal and do not warrant immediate concern of infection however, patient advised to report findings to physician. Passive range performed to maintain joint range of motion. Patient tolerated session well. Session focused onrange of motion, and patient education to improve functional task performance with daily activities. Isometric exercises initiated at today's session. Patient tolerated all activity with no complaints. Therapy services to continue with observation incision sight next visit. Patient progressing well towards goals.  Patient benefiting from skilled hand therapy OT to reduce deficits to improve independence with daily activities. Plan:   Focus on range of motion and strengthening to improve ability to complete daily activites with ease.   POC 7/18/23-10/10/23

## 2023-08-03 ENCOUNTER — OFFICE VISIT (OUTPATIENT)
Dept: OBGYN CLINIC | Facility: CLINIC | Age: 53
End: 2023-08-03

## 2023-08-03 ENCOUNTER — OFFICE VISIT (OUTPATIENT)
Dept: OCCUPATIONAL THERAPY | Facility: CLINIC | Age: 53
End: 2023-08-03
Payer: COMMERCIAL

## 2023-08-03 VITALS
SYSTOLIC BLOOD PRESSURE: 132 MMHG | WEIGHT: 288.2 LBS | HEIGHT: 69 IN | BODY MASS INDEX: 42.69 KG/M2 | DIASTOLIC BLOOD PRESSURE: 83 MMHG | HEART RATE: 87 BPM

## 2023-08-03 DIAGNOSIS — Z98.890 S/P MUSCULOSKELETAL SYSTEM SURGERY: Primary | ICD-10-CM

## 2023-08-03 DIAGNOSIS — S53.401D SPRAIN OF RIGHT ELBOW, SUBSEQUENT ENCOUNTER: ICD-10-CM

## 2023-08-03 DIAGNOSIS — S53.431A COMPLETE TEAR OF LATERAL COLLATERAL LIGAMENT OF RIGHT ELBOW: ICD-10-CM

## 2023-08-03 DIAGNOSIS — Z98.890 POST-OPERATIVE STATE: Primary | ICD-10-CM

## 2023-08-03 PROCEDURE — 97140 MANUAL THERAPY 1/> REGIONS: CPT

## 2023-08-03 PROCEDURE — 99024 POSTOP FOLLOW-UP VISIT: CPT | Performed by: ORTHOPAEDIC SURGERY

## 2023-08-03 PROCEDURE — 97110 THERAPEUTIC EXERCISES: CPT

## 2023-08-03 RX ORDER — CEPHALEXIN 500 MG/1
500 CAPSULE ORAL EVERY 6 HOURS SCHEDULED
Qty: 28 CAPSULE | Refills: 0 | Status: SHIPPED | OUTPATIENT
Start: 2023-08-03 | End: 2023-08-10

## 2023-08-03 RX ORDER — FOLIC ACID 1 MG/1
TABLET ORAL
COMMUNITY
Start: 2023-07-27

## 2023-08-03 RX ORDER — TESTOSTERONE 12.5 MG/1.25G
GEL TOPICAL
COMMUNITY
Start: 2023-07-13

## 2023-08-03 NOTE — PROGRESS NOTES
[address]  [address]  [facility] [address]  [phone]     Dash Clark  [id number]  1970    ORTHOPAEDIC SURGERY OUTPATIENT NOTE  8/3/2023    HISTORY:  45 y.o. male who presents approximately 4 weeks s/p Lateral Collateral Ligament Repair With Internal Brace Augmentation - Right, DOS 7/7/2023. Patient has been attending PT and notes a continue clicking sensation at the lateral aspect of elbow. He states he wants an xray done today as he feels things aren't right. He reports clicking started when he began PT. He reports going in the [facility] and he thinks the surgical site may be infected and that possibly inside is infected as well.     Past Medical History:   Diagnosis Date   • GERD (gastroesophageal reflux disease)    • Hyperlipidemia    • Hypertension    • Psoriatic arthritis ([address])      Past Surgical History:   Procedure Laterality Date   • COLLATERAL LIGAMENT REPAIR, ELBOW Right 7/7/2023    Procedure: LATERAL COLLATERAL LIGAMENT REPAIR WITH INTERNAL BRACE AUGMENTATION;  Surgeon: Feliz Bence;  Location: [facility];  Service: Orthopedics   • HIP SURGERY Bilateral      Social History     Socioeconomic History   • Marital status: /Civil Union     Spouse name: Not on file   • Number of children: Not on file   • Years of education: Not on file   • Highest education level: Not on file   Occupational History   • Not on file   Tobacco Use   • Smoking status: Never   • Smokeless tobacco: Never   Vaping Use   • Vaping Use: Never used   Substance and Sexual Activity   • Alcohol use: Yes     Comment: weekly   • Drug use: No   • Sexual activity: Not on file   Other Topics Concern   • Not on file   Social History Narrative   • Not on file     Social Determinants of Health     Financial Resource Strain: Not on file   Food Insecurity: Not on file   Transportation Needs: Not on file   Physical Activity: Not on file   Stress: Not on file   Social Connections: Not on file   Intimate Partner Violence: Not on file   Housing Stability: Not on file     Family History   Problem Relation Age of Onset   • No Known Problems Mother    • No Known Problems Father    • No Known Problems Sister    • No Known Problems Brother    • No Known Problems Daughter    • No Known Problems Maternal Grandmother    • No Known Problems Maternal Grandfather    • No Known Problems Paternal Grandmother    • No Known Problems Paternal Grandfather    • No Known Problems Maternal Aunt    • No Known Problems Maternal Uncle    • No Known Problems Paternal Aunt    • No Known Problems Paternal Uncle    • No Known Problems Cousin    • Alcohol abuse Neg Hx    • Arthritis Neg Hx    • Dementia Neg Hx    • Cancer Neg Hx    • COPD Neg Hx    • Depression Neg Hx    • Lupus Neg Hx    • Drug abuse Neg Hx    • Early death Neg Hx    • Hearing loss Neg Hx    • Hyperlipidemia Neg Hx    • Learning disabilities Neg Hx    • Substance Abuse Neg Hx    • Stroke Neg Hx    • Vision loss Neg Hx    • Pulmonary embolism Neg Hx    • Deep vein thrombosis Neg Hx    • Mental illness Neg Hx    • Crohn's disease Neg Hx    • Rheum arthritis Neg Hx    • Psoriasis Neg Hx    • Asthma Neg Hx      Patient's Medications   New Prescriptions    No medications on file   Previous Medications    BENZONATATE (TESSALON) 200 MG CAPSULE    Take 1 capsule (200 mg total) by mouth 3 (three) times a day as needed for cough    ENBREL 50 MG/ML SOSY    Inject 1 mL (50 mg) under the skin once a week    ERGOCALCIFEROL (ERGOCALCIFEROL) 1.25 MG (86077 UT) CAPSULE    Take 50,000 Units by mouth every 7 days    ESOMEPRAZOLE (NEXIUM) 40 MG CAPSULE    Take 1 capsule (40 mg total) by mouth daily    ESOMEPRAZOLE (NEXIUM) 40 MG CAPSULE    Take 1 capsule (40 mg total) by mouth in the morning    ETANERCEPT (ENBREL) 50 MG/ML SOSY    Inject 1 mL (50 mg total) under the skin once a week    EVOLOCUMAB (REPATHA SURECLICK) 460 MG/ML SOAJ    Inject 140 mg under the skin every 14 (fourteen) days    EZETIMIBE (ZETIA) 10 MG TABLET    Take 10 mg by mouth daily    IBUPROFEN 200 MG CAPS    Ibuprofen 200 MG Oral Capsule  TAKE CAPSULE  PRN as needed    Refills: 0       Active    KETOTIFEN (ZADITOR) 0.025 % OPHTHALMIC SOLUTION    Administer 1 drop into the left eye 2 (two) times a day as needed (eye irritation)    LISINOPRIL (ZESTRIL) 5 MG TABLET    Take 5 mg by mouth daily   Modified Medications    No medications on file   Discontinued Medications    No medications on file     No Known Allergies     There were no vitals taken for this visit. REVIEW OF SYSTEMS:  Constitutional: Negative. HEENT: Negative. Respiratory: Negative. Skin: Negative. Neurological: Negative. Psychiatric/Behavioral: Negative. Musculoskeletal: Negative except for that mentioned in the HPI. PHYSICAL EXAM:    Incision site: well healed, clean, dry and intact  Surgical site: mild erythema   No pain with varus stretch  Full flexion. 20 degrees short of full extension. IMAGING:  No imaging to review    ASSESSMENT AND PLAN:  45 y.o. male approximately 4 weeks s/p Lateral Collateral Ligament Repair With Internal Brace Augmentation - Right, DOS 7/7/2023. Tia splint ordered for patient. We discussed xray is not recommended at this time. Keflex sent to pharmacy electronically, patient will take as directed. Continue PT per provided protocol. He was shown exercises to do at home as well to stretch. Patient will return to office in 2 weeks for follow up.

## 2023-08-06 NOTE — PROGRESS NOTES
Daily Note     Today's date: 2023  Patient name: Patricia Giron  : 1970  MRN: [id number]  Referring provider: Trisha Bagley PA-C  Dx:   Encounter Diagnosis     ICD-10-CM    1. Post-operative state  Z98.890       2. Sprain of right elbow, subsequent encounter  S53.401D                       Subjective: Patient reports to have had follow up with referring physician last week. Patient reports visit went well with physician providing patient with antibiotics for concern about possible infection at incision site. Objective: See treatment diary below. Incision site healing with scab formed over previously open areas. Area noted to be warm to touch. No drainage noted at today's visit. Auth Tracker  Auth Status Total   Visits  Expiration date Co-Insurance   pending 12 10/18                                Visit Tracker: [employer]  Date   IE 7/20 7/24 7/27 7/31 8/3       RE  X                                                                              PMHx:   has a past medical history of GERD (gastroesophageal reflux disease), Hyperlipidemia, Hypertension, and Psoriatic arthritis ([address] Precautions:  Surgery performed on : 4 weeks as of     Immediate Post-op (0-2 weeks):   • Posterior Elbow Orthosis or Locked Hinged elbow orthosis    • Forearm in pronation (in neutral if MCL involved). • Manage edema and mobility of shoulder and wrist/fingers.   Precautions:   o Maintain pronation (or neutral) for first 4 weeks   o NO elbow extension with forearm in supination for up to 6-8                         weeks   o Gentle forearm pronation/supination ROM allowed if elbow is                       flexed 90  degrees   o Avoid varus positions on the elbow   o No excessive flexion for 8-12 weeks      AROM to Strength: Intermediate Phase:Week 3-6   o Avoid any varus load or positions   o Begin with AROM/AAROM/PROM in safe position   o Multiplanar isometrics to elbow and forearm in protected position   o Slowly progress elbow strength avoiding full extension, flexion  or                  supination      Advanced Phase:  Week 6/8 -12   o Begin throwers 10 at week 6   o Gently explore full supination and extension (maybe not until week 12-16)   O Eccentric & plyometric exercises at week 10+      Return to Sport: Week 16   • May return to full duty when injured UE is =85% of contralateral side   o May use Brace at work or when competing to avoid hyper ext or varus l                  load             Manuals HEP 8/7/2023    Edema mobilization      PROM                Ther Ex     Education on HEP and dx  x5min   Wound Debridement   x5min    AROM digit add/abduction x x10   AROM wrist flex/ext/RD/UD x x10   AROM forearm rotation x X 10 in elbow flexion     AROM elbow flex/ext x x20 with dowel in supine with forearm in pronation    AROM shoulder  Dowel flexion/extension    Isometrics elbow   Flex/ext   Isometrics shoulder  Add/abd    Isometrics wrist   Flex/ext   Shoulder flexion   Red band   Shoulder extension   Red band   row  Red band              Ther Act                     Modalities     MHP  5 min           Assessment:   Patient presents with continued wound healing with large portion of incision site healed. Small amount of incision site continues to heal with scabbing present. Decreased redness and warmth noted around area following initiation of antibiotics. No drainage noted. Passive range of motion in safe position continued to be performed in order to increase range of motion. Passive range followed by active range exercises and gentle strengthening Patient tolerated session well. Session focused on range of motion, and patient education to improve functional task performance with daily activities. Patient tolerated all activity with no complaints. Therapy services to continue with observation incision sight next visit. Patient progressing well towards goals.  Patient benefiting from skilled hand therapy OT to reduce deficits to improve independence with daily activities. Plan:   Focus on range of motion and strengthening to improve ability to complete daily activites with ease.   POC 7/18/23-10/10/23

## 2023-08-07 ENCOUNTER — OFFICE VISIT (OUTPATIENT)
Dept: OCCUPATIONAL THERAPY | Facility: CLINIC | Age: 53
End: 2023-08-07
Payer: COMMERCIAL

## 2023-08-07 DIAGNOSIS — Z98.890 POST-OPERATIVE STATE: Primary | ICD-10-CM

## 2023-08-07 DIAGNOSIS — S53.401D SPRAIN OF RIGHT ELBOW, SUBSEQUENT ENCOUNTER: ICD-10-CM

## 2023-08-07 PROCEDURE — 97140 MANUAL THERAPY 1/> REGIONS: CPT

## 2023-08-07 PROCEDURE — 97110 THERAPEUTIC EXERCISES: CPT

## 2023-08-11 ENCOUNTER — APPOINTMENT (OUTPATIENT)
Dept: OCCUPATIONAL THERAPY | Facility: CLINIC | Age: 53
End: 2023-08-11
Payer: COMMERCIAL

## 2023-08-13 NOTE — PROGRESS NOTES
Daily Note      Today's date: 2023  Patient name: Michelle Belle  : 1970  MRN: [id number]  Referring provider: Helen Geiger  Dx:         Encounter Diagnosis       ICD-10-CM     1. Post-operative state  Z98.890         2. Sprain of right elbow, subsequent encounter  S53.401D                  Subjective: Patient with no new reports regarding function or  Symptoms.         Objective: See treatment diary below. Incision site healed with edges approximated. No sign of infection to area; no redness or warmth to area.      Auth Tracker  Auth Status Total   Visits  Expiration date Co-Insurance   pending 12 10/18                                                   Visit Tracker: Janette Rivrea  Date   IE 7/20 7/24 7/27 7/31 8/3     8/14      RE   X                                                                                                                                     PMHx:   has a past medical history of GERD (gastroesophageal reflux disease), Hyperlipidemia, Hypertension, and Psoriatic arthritis ([address]    Precautions:  Surgery performed on : 5 weeks as of      Immediate Post-op (0-2 weeks):              • Posterior Elbow Orthosis or Locked Hinged elbow orthosis               • Forearm in pronation (in neutral if MCL involved). • Manage edema and mobility of shoulder and wrist/fingers.   Precautions:              o Maintain pronation (or neutral) for first 4 weeks              o NO elbow extension with forearm in supination for up to 6-8                                          weeks              o Gentle forearm pronation/supination ROM allowed if  elbow is  flexed 90  degrees              o Avoid varus positions on the elbow              o No excessive flexion for 8-12 weeks        AROM to Strength: Intermediate Phase:Week 3-6              o Avoid any varus load or positions              o Begin with AROM/AAROM/PROM in safe position              o Multiplanar isometrics to elbow and forearm in protected      position              o Slowly progress elbow strength avoiding full extension, flexion       or                         supination        Advanced Phase:  Week 6/8 -12              o Begin throwers 10 at week 6              o Gently explore full supination and extension (maybe not until week 12-16)              O Eccentric & plyometric exercises at week 10+        Return to Sport: Week 16              • May return to full duty when injured UE is =85% of contralateral side              o May use Brace at work or when competing to avoid hyper ext or varus l                      load                 Manuals HEP 8/14/2023    Edema mobilization        PROM                        Ther Ex       Education on HEP and dx   x5min   Wound Debridement    x5min    AROM digit add/abduction x x10   AROM wrist flex/ext/RD/UD x x10   AROM forearm rotation x X 10 in elbow flexion     AROM elbow flex/ext x x20 with dowel in supine with forearm in pronation    AROM shoulder   Dowel flexion/extension    Isometrics elbow    Flex/ext   Isometrics shoulder   Add/abd    Isometrics wrist    Flex/ext   Shoulder flexion    Red band   Shoulder extension    Red band   row   Red band                    Ther Act                                Modalities       MHP   5 min               Assessment:   Decreased redness and warmth noted around area following. No drainage noted. Passive range of motion in safe position continued to be performed in order to increase range of motion. Passive range followed by active range exercises and gentle strengthening. Patient tolerated session well. Session focused on range of motion, and patient education to improve functional task performance with daily activities. Patient tolerated all activity with no complaints. Patient arrived to therapy services without brace. Patient advised to continue with brace wear and follow protocol until told otherwise by physician.  Therapy services to continue with observation incision sight next visit. Patient progressing well towards goals. Patient benefiting from skilled hand therapy OT to reduce deficits to improve independence with daily activities.        Plan:   Focus on range of motion and strengthening to improve ability to complete daily activites with ease.   POC 7/18/23-10/10/23

## 2023-08-14 ENCOUNTER — OFFICE VISIT (OUTPATIENT)
Dept: OCCUPATIONAL THERAPY | Facility: CLINIC | Age: 53
End: 2023-08-14
Payer: COMMERCIAL

## 2023-08-14 DIAGNOSIS — S53.401D SPRAIN OF RIGHT ELBOW, SUBSEQUENT ENCOUNTER: ICD-10-CM

## 2023-08-14 DIAGNOSIS — Z98.890 POST-OPERATIVE STATE: Primary | ICD-10-CM

## 2023-08-14 PROCEDURE — 97110 THERAPEUTIC EXERCISES: CPT

## 2023-08-14 PROCEDURE — 97530 THERAPEUTIC ACTIVITIES: CPT

## 2023-08-14 PROCEDURE — 97140 MANUAL THERAPY 1/> REGIONS: CPT

## 2023-08-14 NOTE — PROGRESS NOTES
Daily Note     Today's date: 2023  Patient name: Silverio Baires  : 1970  MRN: [id number]  Referring provider: Anam Banegas PA-C  Dx:   Encounter Diagnosis     ICD-10-CM    1. Post-operative state  Z98.890       2. Sprain of right elbow, subsequent encounter  S53.401D                       Subjective: Patient with no new reports regarding function or  Symptoms. Objective: See treatment diary below. Incision site healed with edges approximated. No sign of infection to area; no redness or warmth to area. Auth Tracker  Auth Status Total   Visits  Expiration date Co-Insurance   pending 12 10/18                                Visit Tracker: Roberto Bidding  Date   IE 7/20 7/24 7/27 7/31 8/3    8/14    RE  X                                                                              PMHx:   has a past medical history of GERD (gastroesophageal reflux disease), Hyperlipidemia, Hypertension, and Psoriatic arthritis ([address] Precautions:  Surgery performed on : 5 weeks as of     Immediate Post-op (0-2 weeks):   • Posterior Elbow Orthosis or Locked Hinged elbow orthosis    • Forearm in pronation (in neutral if MCL involved). • Manage edema and mobility of shoulder and wrist/fingers.   Precautions:   o Maintain pronation (or neutral) for first 4 weeks   o NO elbow extension with forearm in supination for up to 6-8                         weeks   o Gentle forearm pronation/supination ROM allowed if  elbow is  flexed 90  degrees   o Avoid varus positions on the elbow   o No excessive flexion for 8-12 weeks      AROM to Strength: Intermediate Phase:Week 3-6   o Avoid any varus load or positions   o Begin with AROM/AAROM/PROM in safe position   o Multiplanar isometrics to elbow and forearm in protected   position   o Slowly progress elbow strength avoiding full extension, flexion  or                  supination      Advanced Phase:  Week 6/8 -12   o Begin throwers 10 at week 6   o Gently explore full supination and extension (maybe not until week 12-16)   O Eccentric & plyometric exercises at week 10+      Return to Sport: Week 16   • May return to full duty when injured UE is =85% of contralateral side   o May use Brace at work or when competing to avoid hyper ext or varus l                  load             Manuals HEP 8/14/2023    Edema mobilization      PROM                Ther Ex     Education on HEP and dx  x5min   Wound Debridement   x5min    AROM digit add/abduction x x10   AROM wrist flex/ext/RD/UD x x10   AROM forearm rotation x X 10 in elbow flexion     AROM elbow flex/ext x x20 with dowel in supine with forearm in pronation    AROM shoulder  Dowel flexion/extension    Isometrics elbow   Flex/ext   Isometrics shoulder  Add/abd    Isometrics wrist   Flex/ext   Shoulder flexion   Red band   Shoulder extension   Red band   row  Red band              Ther Act                     Modalities     MHP  5 min           Assessment:   Decreased redness and warmth noted around area following. No drainage noted. Passive range of motion in safe position continued to be performed in order to increase range of motion. Passive range followed by active range exercises and gentle strengthening. Patient tolerated session well. Session focused on range of motion, and patient education to improve functional task performance with daily activities. Patient tolerated all activity with no complaints. Patient arrived to therapy services without brace. Patient advised to continue with brace wear and follow protocol until told otherwise by physician. Therapy services to continue with observation incision sight next visit. Patient progressing well towards goals. Patient benefiting from skilled hand therapy OT to reduce deficits to improve independence with daily activities. Plan:   Focus on range of motion and strengthening to improve ability to complete daily activites with ease.   POC 7/18/23-10/10/23

## 2023-08-16 ENCOUNTER — OFFICE VISIT (OUTPATIENT)
Dept: OBGYN CLINIC | Facility: CLINIC | Age: 53
End: 2023-08-16

## 2023-08-16 VITALS
SYSTOLIC BLOOD PRESSURE: 148 MMHG | BODY MASS INDEX: 42.95 KG/M2 | WEIGHT: 290 LBS | HEIGHT: 69 IN | DIASTOLIC BLOOD PRESSURE: 96 MMHG | HEART RATE: 70 BPM

## 2023-08-16 DIAGNOSIS — S53.431A COMPLETE TEAR OF LATERAL COLLATERAL LIGAMENT OF RIGHT ELBOW: ICD-10-CM

## 2023-08-16 DIAGNOSIS — Z98.890 S/P MUSCULOSKELETAL SYSTEM SURGERY: Primary | ICD-10-CM

## 2023-08-16 PROCEDURE — 99024 POSTOP FOLLOW-UP VISIT: CPT | Performed by: ORTHOPAEDIC SURGERY

## 2023-08-16 NOTE — PROGRESS NOTES
[address]  [address]  [facility] [address]  [phone]     Martín Olson  [id number]  1970    ORTHOPAEDIC SURGERY OUTPATIENT NOTE  8/16/2023    HISTORY:  45 y.o. male approximately 6 weeks s/p Lateral Collateral Ligament Repair With Internal Brace Augmentation - Right, DOS 7/7/2023. patient was last seen in office on 8/3/23 at which time a Tia Splint was ordered and patient was placed on Keflex. He reports he has not received the splint but he did complete his course of antibiotics. He reports improvement from last office visit. He is doing well in PT. He reports he is brace compliant. He states clicking has continued but there is no pain associated with it. Patient has no concerns or complaints at this time. Pain is 4/10, an aching pain.     Past Medical History:   Diagnosis Date   • GERD (gastroesophageal reflux disease)    • Hyperlipidemia    • Hypertension    • Psoriatic arthritis ([address])        Past Surgical History:   Procedure Laterality Date   • COLLATERAL LIGAMENT REPAIR, ELBOW Right 7/7/2023    Procedure: LATERAL COLLATERAL LIGAMENT REPAIR WITH INTERNAL BRACE AUGMENTATION;  Surgeon: Isamar Bolden;  Location: [facility] OR;  Service: Orthopedics   • HIP SURGERY Bilateral        Social History     Socioeconomic History   • Marital status: /Civil Union     Spouse name: Not on file   • Number of children: Not on file   • Years of education: Not on file   • Highest education level: Not on file   Occupational History   • Not on file   Tobacco Use   • Smoking status: Never   • Smokeless tobacco: Never   Vaping Use   • Vaping Use: Never used   Substance and Sexual Activity   • Alcohol use: Yes     Comment: weekly   • Drug use: No   • Sexual activity: Not on file   Other Topics Concern   • Not on file   Social History Narrative   • Not on file     Social Determinants of Health     Financial Resource Strain: Not on file   Food Insecurity: Not on file   Transportation Needs: Not on file   Physical Activity: Not on file   Stress: Not on file   Social Connections: Not on file   Intimate Partner Violence: Not on file   Housing Stability: Not on file       Family History   Problem Relation Age of Onset   • No Known Problems Mother    • No Known Problems Father    • No Known Problems Sister    • No Known Problems Brother    • No Known Problems Daughter    • No Known Problems Maternal Grandmother    • No Known Problems Maternal Grandfather    • No Known Problems Paternal Grandmother    • No Known Problems Paternal Grandfather    • No Known Problems Maternal Aunt    • No Known Problems Maternal Uncle    • No Known Problems Paternal Aunt    • No Known Problems Paternal Uncle    • No Known Problems Cousin    • Alcohol abuse Neg Hx    • Arthritis Neg Hx    • Dementia Neg Hx    • Cancer Neg Hx    • COPD Neg Hx    • Depression Neg Hx    • Lupus Neg Hx    • Drug abuse Neg Hx    • Early death Neg Hx    • Hearing loss Neg Hx    • Hyperlipidemia Neg Hx    • Learning disabilities Neg Hx    • Substance Abuse Neg Hx    • Stroke Neg Hx    • Vision loss Neg Hx    • Pulmonary embolism Neg Hx    • Deep vein thrombosis Neg Hx    • Mental illness Neg Hx    • Crohn's disease Neg Hx    • Rheum arthritis Neg Hx    • Psoriasis Neg Hx    • Asthma Neg Hx       Patient's Medications   New Prescriptions    No medications on file   Previous Medications    BENZONATATE (TESSALON) 200 MG CAPSULE    Take 1 capsule (200 mg total) by mouth 3 (three) times a day as needed for cough    ENBREL 50 MG/ML SOSY    Inject 1 mL (50 mg) under the skin once a week    ERGOCALCIFEROL (ERGOCALCIFEROL) 1.25 MG (51914 UT) CAPSULE    Take 50,000 Units by mouth every 7 days    ESOMEPRAZOLE (NEXIUM) 40 MG CAPSULE    Take 1 capsule (40 mg total) by mouth daily    ESOMEPRAZOLE (NEXIUM) 40 MG CAPSULE    Take 1 capsule (40 mg total) by mouth in the morning    ETANERCEPT (ENBREL) 50 MG/ML SOSY Inject 1 mL (50 mg total) under the skin once a week    EVOLOCUMAB (REPATHA SURECLICK) 965 MG/ML SOAJ    Inject 140 mg under the skin every 14 (fourteen) days    EZETIMIBE (ZETIA) 10 MG TABLET    Take 10 mg by mouth daily    FOLIC ACID (FOLVITE) 1 MG TABLET        IBUPROFEN 200 MG CAPS    Ibuprofen 200 MG Oral Capsule  TAKE CAPSULE  PRN as needed    Refills: 0       Active    KETOTIFEN (ZADITOR) 0.025 % OPHTHALMIC SOLUTION    Administer 1 drop into the left eye 2 (two) times a day as needed (eye irritation)    LISINOPRIL (ZESTRIL) 5 MG TABLET    Take 5 mg by mouth daily    TESTOSTERONE 12.5 MG/ACT (1%) GEL       Modified Medications    No medications on file   Discontinued Medications    No medications on file     No Known Allergies     /96 (BP Location: Left arm, Patient Position: Sitting, Cuff Size: Standard)   Pulse 70   Ht 5' 9" (1.753 m)   Wt 132 kg (290 lb)   BMI 42.83 kg/m²      REVIEW OF SYSTEMS:  Constitutional: Negative. HEENT: Negative. Respiratory: Negative. Skin: Negative. Neurological: Negative. Psychiatric/Behavioral: Negative. Musculoskeletal: Negative except for that mentioned in the HPI. PHYSICAL EXAM:    Right Elbow:  Extension: 10 deg shy of full  Flexion: 120  Full pronation and supination  Healing incision site    IMAGING:  No new imaging to review    ASSESSMENT AND PLAN:  45 y.o. male  approximately 6 weeks s/p Lateral Collateral Ligament Repair With Internal Brace Augmentation - Right, DOS 7/7/2023. overall patient is progressing appropriately. Continue PT per provided protocol. Can begin strengthening in Physical Therapy. Discontinue brace. Will contact company for the [employer] as he needs to take care of his copay. He will contact the office if he doesn't get it within the week. Limitations: nothing over 10 pounds for cross body reaching motion for palm up or down. Expect some swelling, soreness, and swelling as he continues to use and move the elbow more.  He will return to the office in 4 weeks for follow up.

## 2023-08-16 NOTE — PROGRESS NOTES
OT Re-Evaluation     Today's date: 2023  Patient name: Diana Rivera  : 1970  MRN: [id number]  Referring provider: Caroline Gallo PA-C  Dx:   Encounter Diagnosis     ICD-10-CM    1. Post-operative state  Z98.890       2. Sprain of right elbow, subsequent encounter  S53.401D                      Assessment  Assessment details: See summary in assessment section below   Impairments: abnormal or restricted ROM, impaired physical strength, lacks appropriate home exercise program and pain with function    Goals  Short term goals 2-4 weeks    1)Establish HEP to enhance performance with ADLs. MET    2)Improve active range of motion of right elbow by 20  to assist with UE dressing. MET    3)Improve active range of motion of right wrist by 10  to assist with UE dressing. MET    4)Achieve composite digital flexion to touch distal bullock crease for grasp on utensils. MET    5)Achieve appropriate wound closure in 10 - 14 days post op as evidenced by lack of infection. MET    New: Added     1) improve wrist extension MMT to 5/5 to improve ability to lift/carryobjects   2) improve wrist flexion MMT to 5/5 to improve ability to lift/carry objects  3) improve  strength by 10 to improve ability to gip/grasp objects   4) improve forearm range of motion by 10 in order to perform work related tasks        Long Term goals by discharge  1)Establish final home exercise program to enhance maximal functional level with ADLs. PARTIALLY MET    2)Achieve functional active range of motion of right upper extremity for full return to household chores. PARTIALLY MET      3) Achieve functional strength of right upper extremity for full return to high level ADLs.  PARTIALLY MET                 Plan  Patient would benefit from: custom splinting, OT eval and skilled occupational therapy  Planned modality interventions: cryotherapy, TENS, thermotherapy: hydrocollator packs, ultrasound and unattended electrical stimulation  Planned therapy interventions: dressing changes, fine motor coordination training, flexibility, functional ROM exercises, graded activity, graded exercise, home exercise program, therapeutic exercise, therapeutic training, therapeutic activities, stretching, strengthening, patient education, orthotic management and training, manual therapy and joint mobilization  Frequency: 2x week  Duration in weeks: 12  Plan of Care beginning date: 2023  Plan of Care expiration date: 10/10/2023  Treatment plan discussed with: patient        Subjective Evaluation    History of Present Illness  Date of surgery: 2023  Mechanism of injury: Patient is a 45 y.o. male who presents for OT re-evaluation of right lateral collateral ligament repair. Patient underwent repair of ligament on  and is currently 6 weeks post-operative. Patient has been attending therapy services regularly since initial evaluation performed on . Patient reports to have had follow up with referring physician, who was happy with progress. Patient was discharged from hinge brace at follow up appointment. Patient reports significant improvement in function however range and strength continue to limit ability to perform required activity.            Patient Goals  Patient goals for therapy: decreased edema, decreased pain, increased motion and increased strength    Pain  Current pain ratin  At best pain ratin  At worst pain ratin  Quality: dull ache    Hand dominance: right          Objective     Active Range of Motion     Left Elbow   Flexion: 130 degrees   Extension: 0 degrees   Forearm supination: 90 degrees   Forearm pronation: 90 degrees     Right Elbow   Flexion: 120 degrees   Extension: -20 degrees   Forearm supination: 75 degrees   Forearm pronation: 90 degrees     Left Wrist   Wrist flexion: 75 degrees   Wrist extension: 75 degrees   Radial deviation: 15 degrees   Ulnar deviation: 30 degrees     Right Wrist   Wrist flexion: 70 degrees Wrist extension: 75 degrees   Radial deviation: 15 degrees   Ulnar deviation: 30 degrees     Right Thumb   Opposition: 1 cm from [facility]     Strength/Myotome Testing     Left Elbow   Flexion: 5  Extension: 5    Right Elbow   Flexion: 4+  Extension: 4+    Left Wrist/Hand   Wrist extension: 5  Wrist flexion: 5     (2nd hand position)     Trial 1: 80    Right Wrist/Hand   Wrist extension: 4  Wrist flexion: 4     (2nd hand position)     Trial 1: 80    Swelling   Left Elbow Girth Measurements   Joint line: 31 cm    Right Elbow Girth Measurements   Joint line: 33 cm    Left Wrist/Hand   Circumference MCP: 22.5 cm  Circumference wrist: 20 cm    Right Wrist/Hand   Circumference MCP: 22.5 cm  Circumference wrist: 20 cm               Daily Note               Subjective: Patient reports decreased pain and discomfort at lateral epicondyle. Patient reports continued presence of clicking sensation. Patient reports follow up appointment with referring physician went well and hinge brace was discontinued. Objective: See treatment diary below. Incision site healed with edges approximated. No sign of infection to area; no redness or warmth to area. Auth Tracker  Auth Status Total   Visits  Expiration date Co-Insurance   pending 12 10/18                                Visit Tracker: [facility]  Date 7/18  IE 7/20 7/24 7/27 7/31 8/3    8/14  8/17  RE  X                                                                              PMHx:   has a past medical history of GERD (gastroesophageal reflux disease), Hyperlipidemia, Hypertension, and Psoriatic arthritis ([address]).     Precautions:  Surgery performed on 7/7: 6 weeks as of 8/17    Limitations: nothing over 10 pounds for cross body reaching motion for palm up or down      AROM to Strength: Intermediate Phase:Week 3-6   o Avoid any varus load or positions   o conitue with AROM/AAROM/PROM    o Continue multiplanar isometrics to elbow and forearm in protected position   o progress elbow strength gradual increase of full extension, flexion or  supination    Advanced Phase:  Week 6/8 -12   o continue throwers 10    o Gently explore full supination and extension (maybe not until week 12-16)   O Eccentric & plyometric exercises at week 10+      Return to Sport: Week 16   • May return to full duty when injured UE is =85% of contralateral side   o May use Brace at work or when competing to avoid hyper ext or varus load             Manuals HEP 8/17/2023    Scar mobilization      PROM      IASTM  To bicep         Ther Ex     Education on HEP and dx  x5min   Wound Debridement   x5min    AROM digit add/abduction x x10   AROM wrist flex/ext/RD/UD x x10   AROM forearm rotation x X 10 in elbow flexion     AROM elbow flex/ext x x20 with dowel in supine with forearm in pronation    AROM shoulder  Dowel flexion/extension    Isometrics elbow   Flex/ext   Isometrics shoulder  Add/abd    Isometrics wrist   Flex/ext   Shoulder flexion   Red band   Shoulder extension   Red band   row  Red band              Ther Act                     Modalities     MHP  5 min           Assessment:   Re-evaluation performed at today's session. Upon assessment patient presents with improvement in all areas. Range of motion measurements taken show significant improvement from initial evaluation with improvement in all areas. Patient demonstrates wrist range of motion nearly equivalent to unaffected extremity. Patient continues to have limitations with elbow extension, flexion and supination with greatest deficits in extension and supination. Strength measures taken at today's visit. Patient demonstrates slight decreases in strength on injured extremity compared to non dominate left side. Patient tolerated session well. Manual treatment performed to reduce scat tissue and reduce tightness of bicep contributing to lack of available extension.  Strengthening activities not performed at today's visit as patient had to leave early. Patient tolerated all activity with no complaints. Therapy services to continue with observation incision sight next visit. Patient progressing well towards goals. Patient benefiting from skilled hand therapy OT to reduce deficits to improve independence with daily activities. Plan:   Focus on range of motion and strengthening to improve ability to complete daily activites with ease.   POC 7/18/23-10/10/23

## 2023-08-17 ENCOUNTER — EVALUATION (OUTPATIENT)
Dept: OCCUPATIONAL THERAPY | Facility: CLINIC | Age: 53
End: 2023-08-17
Payer: COMMERCIAL

## 2023-08-17 DIAGNOSIS — S53.401D SPRAIN OF RIGHT ELBOW, SUBSEQUENT ENCOUNTER: ICD-10-CM

## 2023-08-17 DIAGNOSIS — Z98.890 POST-OPERATIVE STATE: Primary | ICD-10-CM

## 2023-08-17 PROCEDURE — 97110 THERAPEUTIC EXERCISES: CPT

## 2023-08-17 PROCEDURE — 97140 MANUAL THERAPY 1/> REGIONS: CPT

## 2023-08-18 NOTE — PROGRESS NOTES
Daily Note     Today's date: 2023  Patient name: Brock Carranza  : 1970  MRN: [id number]  Referring provider: Charlotte Santana PA-C  Dx:   Encounter Diagnosis     ICD-10-CM    1. Post-operative state  Z98.890       2. Sprain of right elbow, subsequent encounter  S53.401D                     Subjective: Patient reports continued improvement regarding elbow range of motion and decreased pain. Patient reports gradual increase in work tasks however is avoiding strenuous tasks. Objective: See treatment diary below. Incision site healed with edges approximated. No sign of infection to area; no redness or warmth to area. Auth Tracker  Auth Status Total   Visits  Expiration date Co-Insurance   pending 12 10/18                                Visit Tracker: Prosper Dom  Date   IE 7/20 7/24 7/27 7/31 8/3    8/14  8/17  RE    X                                                                              PMHx:   has a past medical history of GERD (gastroesophageal reflux disease), Hyperlipidemia, Hypertension, and Psoriatic arthritis ([address]     Precautions:  Surgery performed on : 6 weeks as of     Limitations: nothing over 10 pounds for cross body reaching motion for palm up or down      AROM to Strength: Intermediate Phase:Week 3-6   o Avoid any varus load or positions   o conitue with AROM/AAROM/PROM    o Continue multiplanar isometrics to elbow and forearm in protected position   o  progress elbow strength gradual increase of full extension, flexion or  supination    Advanced Phase:  Week / -12   o continue throwers 10    o Gently explore full supination and extension (maybe not until week 12-16)   O Eccentric & plyometric exercises at week 10+    Return to Sport: Week 16   • May return to full duty when injured UE is =85% of contralateral side   o May use Brace at work or when competing to avoid hyper ext or varus load      Manuals HEP 2023   Scar mobilization      PROM      IASTM  To bicep         Ther Ex     Education on HEP and dx  x5min   Wound Debridement   x5min    AROM digit add/abduction x x10   AROM wrist flex/ext/RD/UD x x10   AROM forearm rotation x X 10 in elbow flexion     AROM elbow flex/ext x x20 with dowel in supine with forearm in pronation    AROM shoulder  Dowel flexion/extension    Isometrics elbow   Flex/ext   Shoulder flexion   Blue band x30   Shoulder extension   Blue band x30   row  Blue band x30   Diagonals   Blue band x30   Shoulder adduction   Blue band x30   Wrist flex/ext  3lb x30    Pronation/supination   x30 w/ dowel elbow @ 90              Ther Act      [facility] rotation   With elbow extended (sup/prone, flex/ext)              Modalities     MHP  5 min           Assessment:   Patient continues to have limitations with elbow extension, flexion and supination with greatest deficits in extension and supination. Patient tolerated session well. Manual treatment performed to reduce scar  tissue and reduce tightness of bicep contributing to lack of available extension. Strengthening activities continued to be performed at today's visit. Patient tolerated all activity with no complaints. Therapy services to continue with observation incision sight next visit. Patient progressing well towards goals. Patient benefiting from skilled hand therapy OT to reduce deficits to improve independence with daily activities. Plan:   Focus on range of motion and strengthening to improve ability to complete daily activites with ease.   POC 7/18/23-10/10/23

## 2023-08-21 ENCOUNTER — OFFICE VISIT (OUTPATIENT)
Dept: OCCUPATIONAL THERAPY | Facility: CLINIC | Age: 53
End: 2023-08-21
Payer: COMMERCIAL

## 2023-08-21 DIAGNOSIS — S53.401D SPRAIN OF RIGHT ELBOW, SUBSEQUENT ENCOUNTER: ICD-10-CM

## 2023-08-21 DIAGNOSIS — Z98.890 POST-OPERATIVE STATE: Primary | ICD-10-CM

## 2023-08-21 PROCEDURE — 97110 THERAPEUTIC EXERCISES: CPT

## 2023-08-21 PROCEDURE — 97530 THERAPEUTIC ACTIVITIES: CPT

## 2023-08-21 PROCEDURE — 97140 MANUAL THERAPY 1/> REGIONS: CPT

## 2023-08-23 NOTE — PROGRESS NOTES
Daily Note     Today's date: 2023  Patient name: Leroy Fraire  : 1970  MRN: [id number]  Referring provider: Palomo Brandon PA-C  Dx:   Encounter Diagnosis     ICD-10-CM    1. Post-operative state  Z98.890       2. Sprain of right elbow, subsequent encounter  S53.401D                     Subjective: Patient reports continued improvement in range of motion with noted improvement in elbow range, achieving further end range in extension. Patient reports continued improvement in ability to use elbow for daily activity however difficulty still experienced. Objective: See treatment diary below. Incision site healed with edges approximated. No sign of infection to area; no redness or warmth to area. Auth Tracker  Auth Status Total   Visits  Expiration date Co-Insurance   pending 12 10/18                                Visit Tracker: Darrius Menard  Date   IE 7/20 7/24 7/27 7/31 8/3    8/14  8/17  RE   X                                                                              PMHx:   has a past medical history of GERD (gastroesophageal reflux disease), Hyperlipidemia, Hypertension, and Psoriatic arthritis ([address]     Precautions:  Surgery performed on : 6 weeks as of     Limitations: nothing over 10 pounds for cross body reaching motion for palm up or down      AROM to Strength: Intermediate Phase:Week 3-6   o Avoid any varus load or positions   o conitue with AROM/AAROM/PROM    o Continue multiplanar isometrics to elbow and forearm in protected position   o  progress elbow strength gradual increase of full extension, flexion or  supination    Advanced Phase:  Week  -12   o continue throwers 10    o Gently explore full supination and extension (maybe not until week 12-16)   O Eccentric & plyometric exercises at week 10+    Return to Sport: Week 16   • May return to full duty when injured UE is =85% of contralateral side   o May use Brace at work or when competing to avoid hyper ext or varus load      Manuals HEP 8/24/2023   Scar mobilization      PROM      IASTM  To bicep         Ther Ex     Education on HEP and dx  x5min   Wound Debridement   x5min    AROM digit add/abduction x x10   AROM wrist flex/ext/RD/UD x x10   AROM forearm rotation x X 10 in elbow flexion     AROM elbow flex/ext x x20 with dowel in supine with forearm in pronation    AROM shoulder  Dowel flexion/extension    Isometrics elbow   Flex/ext   Shoulder flexion   Blue band x30   Shoulder extension   Blue band x30   row  Blue band x30   Diagonals   Blue band x30   Shoulder adduction   Blue band x30   Wrist flex/ext  3lb x30    Pronation/supination   x30 w/ dowel elbow @ 90              Ther Act      Cone rotation   With elbow extended (sup/prone, flex/ext)              Modalities     MHP  5 min           Assessment:   Patient presents with improvement in elbow extension with near full extension comparable to non affected side. Slight limitations continue to be present due tightness of bicep tendon. Patient tolerated session well. Manual treatment continued to be performed to reduce scar  tissue and reduce tightness of bicep contributing to lack of available extension. Strengthening activities continued to be performed at today's visit. Patient tolerated all activity with no complaints. Therapy services to continue with observation incision sight next visit. Patient progressing well towards goals. Patient benefiting from skilled hand therapy OT to reduce deficits to improve independence with daily activities. Plan:   Focus on range of motion and strengthening to improve ability to complete daily activites with ease.   POC 7/18/23-10/10/23

## 2023-08-24 ENCOUNTER — OFFICE VISIT (OUTPATIENT)
Dept: OCCUPATIONAL THERAPY | Facility: CLINIC | Age: 53
End: 2023-08-24
Payer: COMMERCIAL

## 2023-08-24 DIAGNOSIS — S53.401D SPRAIN OF RIGHT ELBOW, SUBSEQUENT ENCOUNTER: ICD-10-CM

## 2023-08-24 DIAGNOSIS — Z98.890 POST-OPERATIVE STATE: Primary | ICD-10-CM

## 2023-08-24 PROCEDURE — 97110 THERAPEUTIC EXERCISES: CPT

## 2023-08-24 PROCEDURE — 97530 THERAPEUTIC ACTIVITIES: CPT

## 2023-08-24 PROCEDURE — 97140 MANUAL THERAPY 1/> REGIONS: CPT

## 2023-08-28 ENCOUNTER — OFFICE VISIT (OUTPATIENT)
Dept: OCCUPATIONAL THERAPY | Facility: CLINIC | Age: 53
End: 2023-08-28
Payer: COMMERCIAL

## 2023-08-28 DIAGNOSIS — S53.401D SPRAIN OF RIGHT ELBOW, SUBSEQUENT ENCOUNTER: ICD-10-CM

## 2023-08-28 DIAGNOSIS — Z98.890 POST-OPERATIVE STATE: Primary | ICD-10-CM

## 2023-08-28 PROCEDURE — 97110 THERAPEUTIC EXERCISES: CPT

## 2023-08-28 PROCEDURE — 97140 MANUAL THERAPY 1/> REGIONS: CPT

## 2023-08-30 NOTE — PROGRESS NOTES
Daily Note     Today's date: 2023  Patient name: Darryle Grace  : 1970  MRN: [id number]  Referring provider: Sony Clemons PA-C  Dx:   No diagnosis found. Subjective: Patient reports continued improvement in elbow range, achieving further end range in extension. Patient reports continued improvement in ability to use elbow for daily activity however difficulty still experienced. Patient reports slight sensation of tightness. Objective: See treatment diary below. Auth Tracker  Auth Status Total   Visits  Expiration date Co-Insurance   pending 12 10/18                                Visit Tracker: Esperanza Ariel  Date   IE 7/20 7/24 7/27 7/31 8/3    8/7 8/14   8/17  RE   X                                                                              PMHx:   has a past medical history of GERD (gastroesophageal reflux disease), Hyperlipidemia, Hypertension, and Psoriatic arthritis ([address]     Precautions:  Surgery performed on : 6 weeks as of     Limitations: nothing over 10 pounds for cross body reaching motion for palm up or down      AROM to Strength: Intermediate Phase:Week 3-6   o Avoid any varus load or positions   o conitue with AROM/AAROM/PROM    o Continue multiplanar isometrics to elbow and forearm in protected position   o  progress elbow strength gradual increase of full extension, flexion or  supination    Advanced Phase:  Week / -12   o continue throwers 10    o Gently explore full supination and extension (maybe not until week 12-16)   O Eccentric & plyometric exercises at week 10+    Return to Sport: Week 16   • May return to full duty when injured UE is =85% of contralateral side   o May use Brace at work or when competing to avoid hyper ext or varus load      Manuals HEP 2023   Scar mobilization      PROM      IASTM  To bicep         Ther Ex     Education on HEP and dx  x5min   Wound Debridement   x5min    AROM digit add/abduction x x10   AROM wrist flex/ext/RD/UD x x10   AROM forearm rotation x X 10 in elbow flexion     AROM elbow flex/ext x x20 with dowel in supine with forearm in pronation    AROM shoulder  Dowel flexion/extension    Isometrics elbow   Flex/ext   Shoulder flexion   Blue band x30   Shoulder extension   Blue band x30   row  Blue band x30   Diagonals   Blue band x30   Shoulder adduction   Blue band x30   Wrist flex/ext  3lb x30    Pronation/supination   x30 w/ dowel elbow @ 90              Ther Act      Cone rotation   With elbow extended (sup/prone, flex/ext)              Modalities     MHP  5 min           Assessment:   Patient presents with continued improvement in elbow extension with near full extension comparable to non affected side. Slight limitations continue to be present due tightness of bicep tendon. Patient tolerated session well. Manual treatment continued to be performed to reduce scar  tissue and reduce tightness of bicep contributing to lack of available extension. Strengthening activities continued to be performed at today's visit. Patient tolerated all activity with no complaints. Therapy services to continue with observation incision sight next visit. Patient progressing well towards goals. Patient benefiting from skilled hand therapy OT to reduce deficits to improve independence with daily activities. Plan:   Focus on range of motion and strengthening to improve ability to complete daily activites with ease.   POC 7/18/23-10/10/23

## 2023-08-31 ENCOUNTER — APPOINTMENT (OUTPATIENT)
Dept: OCCUPATIONAL THERAPY | Facility: CLINIC | Age: 53
End: 2023-08-31
Payer: COMMERCIAL

## 2023-09-01 ENCOUNTER — RA CDI HCC (OUTPATIENT)
Dept: OTHER | Facility: HOSPITAL | Age: 53
End: 2023-09-01

## 2023-09-01 NOTE — PROGRESS NOTES
[address] [facility] coding opportunities          Chart Reviewed number of suggestions sent to Provider: 1     Patients Insurance        Commercial Insurance: [address]     [id number]

## 2023-09-01 NOTE — PROGRESS NOTES
Daily Note     Today's date: 2023  Patient name: Yumi Tarango  : 1970  MRN: [id number]  Referring provider: Abhi Mendez PA-C  Dx:   No diagnosis found. Subjective: Patient reports continued improvement in elbow range, achieving further end range in extension. Patient reports continued improvement in ability to use elbow for daily activity however difficulty still experienced. Patient reports slight sensation of tightness. Objective: See treatment diary below. Auth Tracker  Auth Status Total   Visits  Expiration date Co-Insurance   pending 12 10/18                                Visit Tracker: Rachelwashola Aponte  Date   IE 7/20 7/24 7/27 7/31 8/3    8/7 8/14   8/17  RE   X                                                                              PMHx:   has a past medical history of GERD (gastroesophageal reflux disease), Hyperlipidemia, Hypertension, and Psoriatic arthritis ([address]     Precautions:  Surgery performed on : 6 weeks as of     Limitations: nothing over 10 pounds for cross body reaching motion for palm up or down      AROM to Strength: Intermediate Phase:Week 3-6   o Avoid any varus load or positions   o conitue with AROM/AAROM/PROM    o Continue multiplanar isometrics to elbow and forearm in protected position   o  progress elbow strength gradual increase of full extension, flexion or  supination    Advanced Phase:  Week / -12   o continue throwers 10    o Gently explore full supination and extension (maybe not until week 12-16)   O Eccentric & plyometric exercises at week 10+    Return to Sport: Week 16   • May return to full duty when injured UE is =85% of contralateral side   o May use Brace at work or when competing to avoid hyper ext or varus load      Manuals HEP 2023   Scar mobilization      PROM      IASTM  To bicep         Ther Ex     Education on HEP and dx  x5min   Wound Debridement   x5min    AROM digit add/abduction x x10   AROM wrist flex/ext/RD/UD x x10   AROM forearm rotation x X 10 in elbow flexion     AROM elbow flex/ext x x20 with dowel in supine with forearm in pronation    AROM shoulder  Dowel flexion/extension    Isometrics elbow   Flex/ext   Shoulder flexion   Blue band x30   Shoulder extension   Blue band x30   row  Blue band x30   Diagonals   Blue band x30   Shoulder adduction   Blue band x30   Wrist flex/ext  3lb x30    Pronation/supination   x30 w/ dowel elbow @ 90              Ther Act      Cone rotation   With elbow extended (sup/prone, flex/ext)              Modalities     MHP  5 min           Assessment:   Patient presents with continued improvement in elbow extension with near full extension comparable to non affected side. Slight limitations continue to be present due tightness of bicep tendon. Patient tolerated session well. Manual treatment continued to be performed to reduce scar  tissue and reduce tightness of bicep contributing to lack of available extension. Strengthening activities continued to be performed at today's visit. Patient tolerated all activity with no complaints. Therapy services to continue with observation incision sight next visit. Patient progressing well towards goals. Patient benefiting from skilled hand therapy OT to reduce deficits to improve independence with daily activities. Plan:   Focus on range of motion and strengthening to improve ability to complete daily activites with ease.   POC 7/18/23-10/10/23

## 2023-09-05 ENCOUNTER — OFFICE VISIT (OUTPATIENT)
Dept: INTERNAL MEDICINE CLINIC | Facility: CLINIC | Age: 53
End: 2023-09-05
Payer: COMMERCIAL

## 2023-09-05 VITALS
OXYGEN SATURATION: 98 % | HEART RATE: 73 BPM | RESPIRATION RATE: 16 BRPM | HEIGHT: 69 IN | DIASTOLIC BLOOD PRESSURE: 82 MMHG | SYSTOLIC BLOOD PRESSURE: 120 MMHG | TEMPERATURE: 98 F | BODY MASS INDEX: 43.69 KG/M2 | WEIGHT: 295 LBS

## 2023-09-05 DIAGNOSIS — E66.01 MORBID OBESITY (HCC): Primary | ICD-10-CM

## 2023-09-05 DIAGNOSIS — L40.50 PSORIATIC ARTHRITIS (HCC): ICD-10-CM

## 2023-09-05 DIAGNOSIS — E29.1 HYPOGONADISM IN MALE: ICD-10-CM

## 2023-09-05 DIAGNOSIS — K21.9 GASTROESOPHAGEAL REFLUX DISEASE WITHOUT ESOPHAGITIS: ICD-10-CM

## 2023-09-05 DIAGNOSIS — E78.2 MIXED HYPERLIPIDEMIA: ICD-10-CM

## 2023-09-05 DIAGNOSIS — R79.89 ABNORMAL LFTS: ICD-10-CM

## 2023-09-05 DIAGNOSIS — N40.1 BENIGN PROSTATIC HYPERPLASIA WITH LOWER URINARY TRACT SYMPTOMS, SYMPTOM DETAILS UNSPECIFIED: ICD-10-CM

## 2023-09-05 DIAGNOSIS — Z98.890 POST-OPERATIVE STATE: ICD-10-CM

## 2023-09-05 DIAGNOSIS — D51.9 ANEMIA DUE TO VITAMIN B12 DEFICIENCY, UNSPECIFIED B12 DEFICIENCY TYPE: ICD-10-CM

## 2023-09-05 DIAGNOSIS — E55.9 VITAMIN D DEFICIENCY: ICD-10-CM

## 2023-09-05 DIAGNOSIS — E74.39 GLUCOSE INTOLERANCE: ICD-10-CM

## 2023-09-05 DIAGNOSIS — Z13.0 SCREENING FOR DEFICIENCY ANEMIA: ICD-10-CM

## 2023-09-05 PROCEDURE — 99214 OFFICE O/P EST MOD 30 MIN: CPT | Performed by: INTERNAL MEDICINE

## 2023-09-05 RX ORDER — ESOMEPRAZOLE MAGNESIUM 40 MG/1
40 CAPSULE, DELAYED RELEASE ORAL DAILY
Qty: 90 CAPSULE | Refills: 2 | Status: SHIPPED | OUTPATIENT
Start: 2023-09-05

## 2023-09-05 NOTE — PATIENT INSTRUCTIONS
Hypogonadism   AMBULATORY CARE:   Hypogonadism  is a syndrome that is also called androgen deficiency. When you have hypogonadism, your body does not make enough testosterone. Common signs and symptoms include:   Decrease in the number of times you have an erection     Erectile dysfunction    Decreased libido    Testes that are small or getting smaller    Decreased pubic hair     Increased sweating and hot flashes    Increase in breast size and body fat     Decrease in muscle mass and physical activity tolerance    Call your doctor if:   Your symptoms get worse. You have questions or concerns about your condition or care. Treatment  depends on the cause of your hypogonadism. You may need any of the following:  Medicines  may be needed to replace the testosterone hormone in your body. Your healthcare provider may also tell you to stop taking certain medicines. Surgery or radiation therapy  may help correct a problem with the pituitary gland. Follow up with your doctor as directed:  Write down your questions so you remember to ask them during your visits. © Copyright [employer] 2022 Information is for End User's use only and may not be sold, redistributed or otherwise used for commercial purposes. The above information is an  only. It is not intended as medical advice for individual conditions or treatments. Talk to your doctor, nurse or pharmacist before following any medical regimen to see if it is safe and effective for you.

## 2023-09-05 NOTE — ASSESSMENT & PLAN NOTE
Testosterone recheck a free and total on supplement for now seen by urologist testosterone gel check repeat testosterone level total and free

## 2023-09-06 ENCOUNTER — APPOINTMENT (OUTPATIENT)
Dept: OCCUPATIONAL THERAPY | Facility: CLINIC | Age: 53
End: 2023-09-06
Payer: COMMERCIAL

## 2023-09-06 PROBLEM — I10 PRIMARY HYPERTENSION: Status: ACTIVE | Noted: 2023-09-06

## 2023-09-06 PROBLEM — E66.01 MORBID OBESITY (HCC): Status: ACTIVE | Noted: 2023-09-06

## 2023-09-06 NOTE — ASSESSMENT & PLAN NOTE
Patient on Enbrel 50 mg weekly injection symptoms controlled also to use ibuprofen as needed follow-up with rheumatology dermatology

## 2023-09-06 NOTE — PROGRESS NOTES
Dr. Marcia Monique Office Visit Note  23     Silvestre Contreras 45 y.o. male MRN: [id number]  : 1970    Assessment:     1. Psoriatic arthritis ([address])  Assessment & Plan:  Patient on Enbrel 50 mg weekly injection symptoms controlled also to use ibuprofen as needed follow-up with rheumatology dermatology    Orders:  -     Comprehensive metabolic panel; Future    2. Gastroesophageal reflux disease without esophagitis  Assessment & Plan:  Symptoms controlled continue Nexium 40 mg daily discussed the side effects we will check CBC B12 level    Orders:  -     esomeprazole (NexIUM) 40 MG capsule; Take 1 capsule (40 mg total) by mouth in the morning    3. Mixed hyperlipidemia  Assessment & Plan:  Continue low-fat low-cholesterol diet check lipid profile before next visit    Orders:  -     Comprehensive metabolic panel; Future  -     Lipid Panel with Direct LDL reflex; Future    4. Abnormal LFTs  -     Comprehensive metabolic panel; Future    5. Hypogonadism in male  Assessment & Plan:  Testosterone recheck a free and total on supplement for now seen by urologist testosterone gel check repeat testosterone level total and free    Orders:  -     Testosterone, free, total; Future    6. Benign prostatic hyperplasia with lower urinary tract symptoms, symptom details unspecified  Assessment & Plan:  Followed by urologist explained the side effect of testosterone causing BPH      7. Post-operative state  Assessment & Plan:  Right elbow surgery no sign of infection stable      8. Screening for deficiency anemia  -     CBC and differential; Future    9. Glucose intolerance  -     Hemoglobin A1C; Future  -     Albumin / creatinine urine ratio; Future  -     Urinalysis with microscopic; Future    10. Vitamin D deficiency  -     Vitamin D 25 hydroxy; Future; Expected date: 10/05/2023    11. Anemia due to vitamin B12 deficiency, unspecified B12 deficiency type  -     Methylmalonic acid, serum;  Future          Discussion Summary and Plan:  Today's care plan and medications were reviewed with patient in detail and all their questions answered to their satisfaction. Chief Complaint   Patient presents with   • Follow-up      Subjective:  Came in follow-up chronic medical condition listed under visit diagnosis been taking Nexium for more than 10 years for GERD symptoms explained to use only as needed explained the side effect at length we will check CBC for any iron deficiency anemia B12 check B12 level for B12 deficiency and also on Enbrel injection for psoriatic arthritis seen by urologist found to have a low testosterone with symptoms of tired weak fatigue on testosterone replacement gel explained the side effect at length and given complete blood work including B12 level and total testosterone level with free testosterone      The following portions of the patient's history were reviewed and updated as appropriate: allergies, current medications, past family history, past medical history, past social history, past surgical history and problem list.    Review of Systems   Constitutional: Positive for activity change and fatigue. Negative for appetite change, chills, diaphoresis, fever and unexpected weight change. HENT: Negative for congestion, dental problem, drooling, ear discharge, ear pain, facial swelling, hearing loss, mouth sores, nosebleeds, postnasal drip, rhinorrhea, sinus pressure, sneezing, sore throat, tinnitus, trouble swallowing and voice change. Eyes: Negative for photophobia, pain, discharge, redness, itching and visual disturbance. Respiratory: Negative for apnea, cough, choking, chest tightness, shortness of breath, wheezing and stridor. Cardiovascular: Negative for chest pain, palpitations and leg swelling. Gastrointestinal: Negative for abdominal distention, abdominal pain, anal bleeding, blood in stool, constipation, diarrhea, nausea, rectal pain and vomiting.    Endocrine: Negative for cold intolerance, heat intolerance, polydipsia, polyphagia and polyuria. Genitourinary: Negative for decreased urine volume, difficulty urinating, dysuria, enuresis, flank pain, frequency, genital sores, hematuria and urgency. Musculoskeletal: Positive for arthralgias, gait problem and joint swelling. Negative for back pain, myalgias, neck pain and neck stiffness. Skin: Negative for color change, pallor, rash and wound. Allergic/Immunologic: Negative. Negative for environmental allergies, food allergies and immunocompromised state. Neurological: Negative for dizziness, tremors, seizures, syncope, facial asymmetry, speech difficulty, weakness, light-headedness, numbness and headaches. Psychiatric/Behavioral: Negative for agitation, behavioral problems, confusion, decreased concentration, dysphoric mood, hallucinations, self-injury, sleep disturbance and suicidal ideas. The patient is not nervous/anxious and is not hyperactive.           Historical Information   Patient Active Problem List   Diagnosis   • Sensorineural hearing loss (SNHL) of both ears   • Abnormal LFTs   • BPH (benign prostatic hyperplasia)   • Psoriatic arthritis ([address])   • GERD (gastroesophageal reflux disease)   • Mixed hyperlipidemia   • Complete tear of lateral collateral ligament of right elbow   • Post-operative state   • Sprain of right elbow   • S/P musculoskeletal system surgery   • Hypogonadism in male   • Primary hypertension     Past Medical History:   Diagnosis Date   • GERD (gastroesophageal reflux disease)    • Hyperlipidemia    • Hypertension    • Psoriatic arthritis ([address])      Past Surgical History:   Procedure Laterality Date   • COLLATERAL LIGAMENT REPAIR, ELBOW Right 7/7/2023    Procedure: LATERAL COLLATERAL LIGAMENT REPAIR WITH INTERNAL BRACE AUGMENTATION;  Surgeon: Tony Barraza;  Location:  MAIN OR;  Service: Orthopedics   • HIP SURGERY Bilateral      Social History     Substance and Sexual Activity   Alcohol Use Yes    Comment: weekly Social History     Substance and Sexual Activity   Drug Use No     Social History     Tobacco Use   Smoking Status Never   Smokeless Tobacco Never     Family History   Problem Relation Age of Onset   • No Known Problems Mother    • No Known Problems Father    • No Known Problems Sister    • No Known Problems Brother    • No Known Problems Daughter    • No Known Problems Maternal Grandmother    • No Known Problems Maternal Grandfather    • No Known Problems Paternal Grandmother    • No Known Problems Paternal Grandfather    • No Known Problems Maternal Aunt    • No Known Problems Maternal Uncle    • No Known Problems Paternal Aunt    • No Known Problems Paternal Uncle    • No Known Problems Cousin    • Alcohol abuse Neg Hx    • Arthritis Neg Hx    • Dementia Neg Hx    • Cancer Neg Hx    • COPD Neg Hx    • Depression Neg Hx    • Lupus Neg Hx    • Drug abuse Neg Hx    • Early death Neg Hx    • Hearing loss Neg Hx    • Hyperlipidemia Neg Hx    • Learning disabilities Neg Hx    • Substance Abuse Neg Hx    • Stroke Neg Hx    • Vision loss Neg Hx    • Pulmonary embolism Neg Hx    • Deep vein thrombosis Neg Hx    • Mental illness Neg Hx    • Crohn's disease Neg Hx    • Rheum arthritis Neg Hx    • Psoriasis Neg Hx    • Asthma Neg Hx      Health Maintenance Due   Topic   • COVID-19 Vaccine (1)   • Pneumococcal Vaccine: Pediatrics (0 to 5 Years) and At-Risk Patients (6 to 59 Years) (1 - PCV)   • HIV Screening    • OT PLAN OF CARE    • Influenza Vaccine (1)   • BMI: Followup Plan       Meds/Allergies       Current Outpatient Medications:   •  Enbrel 50 MG/ML SOSY, Inject 1 mL (50 mg) under the skin once a week, Disp: 4 mL, Rfl: 5  •  esomeprazole (NexIUM) 40 MG capsule, Take 1 capsule (40 mg total) by mouth daily, Disp: 90 capsule, Rfl: 1  •  esomeprazole (NexIUM) 40 MG capsule, Take 1 capsule (40 mg total) by mouth in the morning, Disp: 90 capsule, Rfl: 2  •  etanercept (Enbrel) 50 mg/mL SOSY, Inject 1 mL (50 mg total) under the skin once a week, Disp: 4 mL, Rfl: 5  •  Evolocumab (Repatha SureClick) 277 MG/ML SOAJ, Inject 140 mg under the skin every 14 (fourteen) days, Disp: , Rfl:   •  folic acid (FOLVITE) 1 mg tablet, , Disp: , Rfl:   •  Ibuprofen 200 MG CAPS, Ibuprofen 200 MG Oral Capsule TAKE CAPSULE  PRN as needed   Refills: 0     Active, Disp: , Rfl:   •  lisinopril (ZESTRIL) 5 mg tablet, Take 5 mg by mouth daily, Disp: , Rfl:   •  Testosterone 12.5 MG/ACT (1%) GEL, , Disp: , Rfl:   •  benzonatate (TESSALON) 200 MG capsule, Take 1 capsule (200 mg total) by mouth 3 (three) times a day as needed for cough (Patient not taking: Reported on 8/16/2023), Disp: 20 capsule, Rfl: 0  •  ergocalciferol (ERGOCALCIFEROL) 1.25 MG (84979 UT) capsule, Take 50,000 Units by mouth every 7 days, Disp: , Rfl:       Objective:    Vitals:   /82   Pulse 73   Temp 98 °F (36.7 °C)   Resp 16   Ht 5' 9" (1.753 m)   Wt 134 kg (295 lb)   SpO2 98%   BMI 43.56 kg/m²   Body mass index is 43.56 kg/m². Vitals:    09/05/23 1609   Weight: 134 kg (295 lb)       Physical Exam  Vitals and nursing note reviewed. Constitutional:       General: He is not in acute distress. Appearance: He is well-developed. He is not ill-appearing, toxic-appearing or diaphoretic. HENT:      Head: Normocephalic and atraumatic. Right Ear: External ear normal.      Left Ear: External ear normal.      Nose: Nose normal.      Mouth/Throat:      Pharynx: No oropharyngeal exudate. Eyes:      General: Lids are normal. Lids are everted, no foreign bodies appreciated. No scleral icterus. Right eye: No discharge. Left eye: No discharge. Conjunctiva/sclera: Conjunctivae normal.      Pupils: Pupils are equal, round, and reactive to light. Neck:      Thyroid: No thyromegaly. Vascular: Normal carotid pulses. No carotid bruit, hepatojugular reflux or JVD. Trachea: No tracheal tenderness or tracheal deviation.    Cardiovascular:      Rate and Rhythm: Normal rate and regular rhythm. Pulses: Normal pulses. Heart sounds: Normal heart sounds. No murmur heard. No friction rub. No gallop. Pulmonary:      Effort: Pulmonary effort is normal. No respiratory distress. Breath sounds: Normal breath sounds. No stridor. No wheezing or rales. Chest:      Chest wall: No tenderness. Abdominal:      General: Bowel sounds are normal. There is no distension. Palpations: Abdomen is soft. There is no mass. Tenderness: There is no abdominal tenderness. There is no guarding or rebound. Musculoskeletal:         General: No tenderness or deformity. Normal range of motion. Cervical back: Normal range of motion and neck supple. No edema, erythema or rigidity. No spinous process tenderness or muscular tenderness. Normal range of motion. Lymphadenopathy:      Head:      Right side of head: No submental, submandibular, tonsillar, preauricular or posterior auricular adenopathy. Left side of head: No submental, submandibular, tonsillar, preauricular, posterior auricular or occipital adenopathy. Cervical: No cervical adenopathy. Right cervical: No superficial, deep or posterior cervical adenopathy. Left cervical: No superficial, deep or posterior cervical adenopathy. Upper Body:      Right upper body: No pectoral adenopathy. Left upper body: No pectoral adenopathy. Skin:     General: Skin is warm and dry. Coloration: Skin is not pale. Findings: No erythema or rash. Neurological:      General: No focal deficit present. Mental Status: He is alert and oriented to person, place, and time. Cranial Nerves: No cranial nerve deficit. Sensory: No sensory deficit. Motor: No tremor, abnormal muscle tone or seizure activity. Coordination: Coordination normal.      Gait: Gait normal.      Deep Tendon Reflexes: Reflexes are normal and symmetric.  Reflexes normal.   Psychiatric: Behavior: Behavior normal.         Thought Content: Thought content normal.         Judgment: Judgment normal.         Lab Review   No visits with results within 2 Month(s) from this visit.    Latest known visit with results is:   Admission on 04/11/2023, Discharged on 04/11/2023   Component Date Value Ref Range Status   • WBC 04/11/2023 6.63  4.31 - 10.16 Thousand/uL Final   • RBC 04/11/2023 4.54  3.88 - 5.62 Million/uL Final   • Hemoglobin 04/11/2023 13.5  12.0 - 17.0 g/dL Final   • Hematocrit 04/11/2023 42.2  36.5 - 49.3 % Final   • MCV 04/11/2023 93  82 - 98 fL Final   • MCH 04/11/2023 29.7  26.8 - 34.3 pg Final   • MCHC 04/11/2023 32.0  31.4 - 37.4 g/dL Final   • RDW 04/11/2023 13.2  11.6 - 15.1 % Final   • MPV 04/11/2023 9.6  8.9 - 12.7 fL Final   • Platelets 03/90/5202 200  149 - 390 Thousands/uL Final   • nRBC 04/11/2023 0  /100 WBCs Final   • Neutrophils Relative 04/11/2023 66  43 - 75 % Final   • Immat GRANS % 04/11/2023 0  0 - 2 % Final   • Lymphocytes Relative 04/11/2023 20  14 - 44 % Final   • Monocytes Relative 04/11/2023 12  4 - 12 % Final   • Eosinophils Relative 04/11/2023 1  0 - 6 % Final   • Basophils Relative 04/11/2023 1  0 - 1 % Final   • Neutrophils Absolute 04/11/2023 4.41  1.85 - 7.62 Thousands/µL Final   • Immature Grans Absolute 04/11/2023 0.02  0.00 - 0.20 Thousand/uL Final   • Lymphocytes Absolute 04/11/2023 1.32  0.60 - 4.47 Thousands/µL Final   • Monocytes Absolute 04/11/2023 0.81  0.17 - 1.22 Thousand/µL Final   • Eosinophils Absolute 04/11/2023 0.04  0.00 - 0.61 Thousand/µL Final   • Basophils Absolute 04/11/2023 0.03  0.00 - 0.10 Thousands/µL Final   • LACTIC ACID 04/11/2023 0.5  0.5 - 2.0 mmol/L Final   • Sodium 04/11/2023 135  135 - 147 mmol/L Final   • Potassium 04/11/2023 4.0  3.5 - 5.3 mmol/L Final   • Chloride 04/11/2023 100  96 - 108 mmol/L Final   • CO2 04/11/2023 27  21 - 32 mmol/L Final   • ANION GAP 04/11/2023 8  4 - 13 mmol/L Final   • BUN 04/11/2023 14  5 - 25 mg/dL Final   • Creatinine 04/11/2023 0.76  0.60 - 1.30 mg/dL Final    Standardized to IDMS reference method   • Glucose 04/11/2023 89  65 - 140 mg/dL Final    If the patient is fasting, the ADA then defines impaired fasting glucose as > 100 mg/dL and diabetes as > or equal to 123 mg/dL. • Calcium 04/11/2023 9.1  8.4 - 10.2 mg/dL Final   • eGFR 04/11/2023 103  ml/min/1.73sq m Final   • Blood Culture 04/11/2023 No Growth After 5 Days. Final   • Blood Culture 04/11/2023 No Growth After 5 Days. Final         Patient Instructions   Hypogonadism   AMBULATORY CARE:   Hypogonadism  is a syndrome that is also called androgen deficiency. When you have hypogonadism, your body does not make enough testosterone. Common signs and symptoms include:   Decrease in the number of times you have an erection     Erectile dysfunction    Decreased libido    Testes that are small or getting smaller    Decreased pubic hair     Increased sweating and hot flashes    Increase in breast size and body fat     Decrease in muscle mass and physical activity tolerance    Call your doctor if:   Your symptoms get worse. You have questions or concerns about your condition or care. Treatment  depends on the cause of your hypogonadism. You may need any of the following:  Medicines  may be needed to replace the testosterone hormone in your body. Your healthcare provider may also tell you to stop taking certain medicines. Surgery or radiation therapy  may help correct a problem with the pituitary gland. Follow up with your doctor as directed:  Write down your questions so you remember to ask them during your visits. © Copyright Janeth Farrar 2022 Information is for End User's use only and may not be sold, redistributed or otherwise used for commercial purposes. The above information is an  only. It is not intended as medical advice for individual conditions or treatments.  Talk to your doctor, nurse or pharmacist before following any medical regimen to see if it is safe and effective for you. Sofia Flores MD        "This note has been constructed using a voice recognition system. Therefore there may be syntax, spelling, and/or grammatical errors.  Please call if you have any questions. "

## 2023-09-06 NOTE — ASSESSMENT & PLAN NOTE
Symptoms controlled continue Nexium 40 mg daily discussed the side effects we will check CBC B12 level

## 2023-09-06 NOTE — PROGRESS NOTES
Daily Note     Today's date: 2023  Patient name: Mara Matthews  : 1970  MRN: [id number]  Referring provider: Willie Jones PA-C  Dx:   Encounter Diagnosis     ICD-10-CM    1. Post-operative state  Z98.890       2. Sprain of right elbow, subsequent encounter  S53.401D                     Subjective: Patient reports significant improvement in elbow range and upper extremity functional use. Patient reports to be performing work tasks with no difficulty. Patient reports to experience slight discomfort on posterior elbow. Objective: See treatment diary below. Auth Tracker  Auth Status Total   Visits  Expiration date Co-Insurance   Approved 12 10/18    Approved                            Visit Tracker: Slick Blanchard  Date   IE 7/20 7/24 7/27 7/31 8/3    8/7 8/14   8/17  RE 8/21   8/24   8/28  X    9/8                                                                          PMHx:   has a past medical history of GERD (gastroesophageal reflux disease), Hyperlipidemia, Hypertension, and Psoriatic arthritis ([address]).     Precautions:  Surgery performed on : 9 weeks as of     Limitations: nothing over 10 pounds for cross body reaching motion for palm up or down      AROM to Strength: Intermediate Phase:Week 3-6   o Avoid any varus load or positions   o conitue with AROM/AAROM/PROM    o Continue multiplanar isometrics to elbow and forearm in protected position   o  progress elbow strength gradual increase of full extension, flexion or  supination    Advanced Phase:  Week  -12   o continue throwers 10    o Gently explore full supination and extension (maybe not until week 12-16)   O Eccentric & plyometric exercises at week 10+    Return to Sport: Week 16   • May return to full duty when injured UE is =85% of contralateral side   o May use Brace at work or when competing to avoid hyper ext or varus load      Manuals HEP 2023   Scar mobilization      PROM      IASTM  To bicep         Ther Ex Education on HEP and dx  x5min   Wound Debridement   x5min    AROM digit add/abduction x x10   AROM wrist flex/ext/RD/UD x x10   AROM forearm rotation x X 10 in elbow flexion     AROM elbow flex/ext x x20 with dowel in supine with forearm in pronation    AROM shoulder  Dowel flexion/extension    Isometrics elbow   Flex/ext   Shoulder flexion   Blue band x30   Shoulder extension   Blue band x30   row  Blue band x30   Diagonals   Blue band x30   Shoulder adduction   Blue band x30   Wrist flex/ext  3lb x30    Pronation/supination   x30 w/ dowel elbow @ 90    Cone rotation   With elbow extended (sup/prone, flex/ext)              Modalities     MHP  5 min           Assessment:   Patient presents with slight tenderness at posterior elbow, tenderness likely related to bursa at posterior elbow. Patient demonstrates improvement in elbow range of motion with range similar to non affected extremity. Slight limitations continue to be present due tightness of bicep tendon however, reduced tightness noted. Patient tolerated session well. Manual treatment continued to be performed to reduce scar  tissue and reduce tightness of bicep contributing to lack of available extension. Strengthening activities continued to be performed at today's visit. Patient tolerated all activity with no complaints. Therapy services to continue with observation incision sight next visit. Patient progressing well towards goals. Patient benefiting from skilled hand therapy OT to reduce deficits to improve independence with daily activities. Plan:   Focus on range of motion and strengthening to improve ability to complete daily activites with ease.   POC 7/18/23-10/10/23

## 2023-09-07 ENCOUNTER — APPOINTMENT (OUTPATIENT)
Dept: OCCUPATIONAL THERAPY | Facility: CLINIC | Age: 53
End: 2023-09-07
Payer: COMMERCIAL

## 2023-09-07 NOTE — ASSESSMENT & PLAN NOTE
Advised to lose weight diet exercise very low calorie diet cut down the portion calories change in lifestyle  BMI Counseling: The BMI is above normal. Know Body weight goal    Weigh yourself daily or weekly as per your doctor    Nutrition recommendations include decreasing portion sizes, encouraging healthy choices of fruits and vegetables, decreasing     fast food intake, consuming healthier snacks, limiting drinks that contain sugar, moderation in carbohydrate intake, increasing     intake of lean protein, reducing intake of saturated and trans fat and reducing intake of cholesterol  Discussed options of HealthyCORE-Intensive Lifestyle Intervention Program, Very Low Calorie Diet-VLCD and Conservative Program and the role of weight loss medications. - Explained the importance of making lifestyle changes in addition to starting anti-obesity medications.    -

## 2023-09-08 ENCOUNTER — OFFICE VISIT (OUTPATIENT)
Dept: OCCUPATIONAL THERAPY | Facility: CLINIC | Age: 53
End: 2023-09-08
Payer: COMMERCIAL

## 2023-09-08 DIAGNOSIS — Z98.890 POST-OPERATIVE STATE: Primary | ICD-10-CM

## 2023-09-08 DIAGNOSIS — S53.401D SPRAIN OF RIGHT ELBOW, SUBSEQUENT ENCOUNTER: ICD-10-CM

## 2023-09-08 PROCEDURE — 97110 THERAPEUTIC EXERCISES: CPT

## 2023-09-08 PROCEDURE — 97140 MANUAL THERAPY 1/> REGIONS: CPT

## 2023-09-12 DIAGNOSIS — L40.50 PSORIATIC ARTHRITIS (HCC): ICD-10-CM

## 2023-09-12 RX ORDER — ETANERCEPT 50 MG/ML
SOLUTION SUBCUTANEOUS
Qty: 4 ML | Refills: 5 | Status: SHIPPED | OUTPATIENT
Start: 2023-09-12

## 2023-09-12 NOTE — TELEPHONE ENCOUNTER
Reason for call:   [x] Refill - Pt wants this sent as urgent!    [] Prior Auth  [] Other:     Office:   [] PCP/Provider -   [x] Speciality/Provider -  Angelina Hammans / Rheumatology     Medication: Enbrel 50 mg/mL - Inject 1 mL (50 mg) under the skin once a week    Quantity: 4 mL    Pharmacy: [address] [place] [address]  [phone]

## 2023-09-13 ENCOUNTER — OFFICE VISIT (OUTPATIENT)
Dept: OBGYN CLINIC | Facility: CLINIC | Age: 53
End: 2023-09-13

## 2023-09-13 VITALS — HEIGHT: 69 IN | WEIGHT: 295 LBS | BODY MASS INDEX: 43.69 KG/M2

## 2023-09-13 DIAGNOSIS — M70.21 OLECRANON BURSITIS OF RIGHT ELBOW: ICD-10-CM

## 2023-09-13 DIAGNOSIS — Z98.890 S/P MUSCULOSKELETAL SYSTEM SURGERY: Primary | ICD-10-CM

## 2023-09-13 PROCEDURE — 99024 POSTOP FOLLOW-UP VISIT: CPT | Performed by: ORTHOPAEDIC SURGERY

## 2023-09-13 NOTE — PROGRESS NOTES
[address]  [address]  [facility] [place] [address]  [phone]       Ana Cristina Landaverde  [id number]  1970    ORTHOPAEDIC SURGERY OUTPATIENT NOTE  9/13/2023      HISTORY:  45 y.o. male presents today 10 weeks s/p right lateral collateral ligament repair with internal brace augmentation performed on 7/7/23. Patient reports that he continues to do well post operatively. He denies pain about the lateral aspect of the elbow. He continues to perform OT/HEP with benefit. He only reports a mild pain about the posterior aspect of the elbow after repetitive motion.      Past Medical History:   Diagnosis Date   • GERD (gastroesophageal reflux disease)    • Hyperlipidemia    • Hypertension    • Psoriatic arthritis ([address])        Past Surgical History:   Procedure Laterality Date   • COLLATERAL LIGAMENT REPAIR, ELBOW Right 7/7/2023    Procedure: LATERAL COLLATERAL LIGAMENT REPAIR WITH INTERNAL BRACE AUGMENTATION;  Surgeon: Fabiola Lozada;  Location: [facility] (formerly [facility]3];  Service: Orthopedics   • HIP SURGERY Bilateral        Social History     Socioeconomic History   • Marital status: /Civil Union     Spouse name: Not on file   • Number of children: Not on file   • Years of education: Not on file   • Highest education level: Not on file   Occupational History   • Not on file   Tobacco Use   • Smoking status: Never   • Smokeless tobacco: Never   Vaping Use   • Vaping Use: Never used   Substance and Sexual Activity   • Alcohol use: Yes     Comment: weekly   • Drug use: No   • Sexual activity: Not on file   Other Topics Concern   • Not on file   Social History Narrative   • Not on file     Social Determinants of Health     Financial Resource Strain: Not on file   Food Insecurity: Not on file   Transportation Needs: Not on file   Physical Activity: Not on file   Stress: Not on file   Social Connections: Not on file   Intimate Partner Violence: Not on file   Housing Stability: Not on file       Family History   Problem Relation Age of Onset   • No Known Problems Mother    • No Known Problems Father    • No Known Problems Sister    • No Known Problems Brother    • No Known Problems Daughter    • No Known Problems Maternal Grandmother    • No Known Problems Maternal Grandfather    • No Known Problems Paternal Grandmother    • No Known Problems Paternal Grandfather    • No Known Problems Maternal Aunt    • No Known Problems Maternal Uncle    • No Known Problems Paternal Aunt    • No Known Problems Paternal Uncle    • No Known Problems Cousin    • Alcohol abuse Neg Hx    • Arthritis Neg Hx    • Dementia Neg Hx    • Cancer Neg Hx    • COPD Neg Hx    • Depression Neg Hx    • Lupus Neg Hx    • Drug abuse Neg Hx    • Early death Neg Hx    • Hearing loss Neg Hx    • Hyperlipidemia Neg Hx    • Learning disabilities Neg Hx    • Substance Abuse Neg Hx    • Stroke Neg Hx    • Vision loss Neg Hx    • Pulmonary embolism Neg Hx    • Deep vein thrombosis Neg Hx    • Mental illness Neg Hx    • Crohn's disease Neg Hx    • Rheum arthritis Neg Hx    • Psoriasis Neg Hx    • Asthma Neg Hx         Patient's Medications   New Prescriptions    No medications on file   Previous Medications    BENZONATATE (TESSALON) 200 MG CAPSULE    Take 1 capsule (200 mg total) by mouth 3 (three) times a day as needed for cough    ERGOCALCIFEROL (ERGOCALCIFEROL) 1.25 MG (22544 UT) CAPSULE    Take 50,000 Units by mouth every 7 days    ESOMEPRAZOLE (NEXIUM) 40 MG CAPSULE    Take 1 capsule (40 mg total) by mouth daily    ESOMEPRAZOLE (NEXIUM) 40 MG CAPSULE    Take 1 capsule (40 mg total) by mouth in the morning    ETANERCEPT (ENBREL) 50 MG/ML SOSY    Inject 1 mL (50 mg total) under the skin once a week    ETANERCEPT (ENBREL) 50 MG/ML SOSY    Inject 1 mL (50 mg) under the skin once a week    EVOLOCUMAB (REPATHA SURECLICK) 095 MG/ML SOAJ    Inject 140 mg under the skin every 14 (fourteen) days    FOLIC ACID (FOLVITE) 1 MG TABLET        IBUPROFEN 200 MG CAPS    Ibuprofen 200 MG Oral Capsule  TAKE CAPSULE  PRN as needed    Refills: 0       Active    LISINOPRIL (ZESTRIL) 5 MG TABLET    Take 5 mg by mouth daily    TESTOSTERONE 12.5 MG/ACT (1%) GEL       Modified Medications    No medications on file   Discontinued Medications    No medications on file       No Known Allergies     Ht 5' 9" (1.753 m)   Wt 134 kg (295 lb)   BMI 43.56 kg/m²      REVIEW OF SYSTEMS:  Constitutional: Negative. HEENT: Negative. Respiratory: Negative. Skin: Negative. Neurological: Negative. Psychiatric/Behavioral: Negative. Musculoskeletal: Negative except for that mentioned in the HPI. PHYSICAL EXAM:      R elbow:  Flexion: 130 degrees  Extension: -5 degrees  Pronation: 80 degrees  Supination: 80 degrees    Strength:  Flexion: 5/5  Extension: 5/5  Pronation: 5/5  Supination: 5/5    Varus laxity: negative  Valgus laxity: negative    Radial/median/ulnar nerve intact    <2 sec cap refill    Reflexes:   Triceps: symmetric bilaterally  Biceps: symmetric bilaterally  Brachioradialis: symmetric bilaterally    Incision is well healed      IMAGING: No new imaging    ASSESSMENT AND PLAN:  45 y.o. male who is 10 weeks s/p right lateral collateral ligament repair with internal brace augmentation performed on 7/7/23, doing well. Continue with OT and transition into HEP as tolerated. The posterior elbow pain is most likely olecranon bursitis. Continue with ice, rest and apply Voltaren Gel prn for symptom relief. He has a history of psoriatic arthritis and has treated for arthritis of his bilateral shoulders. He will schedule another appointment to have cortisone injections in the future.  In terms of his right elbow, he will follow up in 6 weeks    Scribe Attestation    I,:  Cindy Gonzales am acting as a scribe while in the presence of the attending physician.:       I,:  Michaelyn Bamberger personally performed the services described in this documentation    as scribed in my presence.:

## 2023-09-14 ENCOUNTER — TELEPHONE (OUTPATIENT)
Age: 53
End: 2023-09-14

## 2023-09-14 NOTE — TELEPHONE ENCOUNTER
Patient called in regarding a refill for Enbrel, I confirmed with the patient a script was sent to [facility] on 9- with 5 refill. I also provided the patient with Rheumatology's main # of [phone], as he was calling in on ortho line but really the medication is rheum.

## 2023-09-15 ENCOUNTER — APPOINTMENT (OUTPATIENT)
Dept: OCCUPATIONAL THERAPY | Facility: CLINIC | Age: 53
End: 2023-09-15
Payer: COMMERCIAL

## 2023-09-18 ENCOUNTER — APPOINTMENT (OUTPATIENT)
Dept: OCCUPATIONAL THERAPY | Facility: CLINIC | Age: 53
End: 2023-09-18
Payer: COMMERCIAL

## 2023-09-22 ENCOUNTER — APPOINTMENT (OUTPATIENT)
Dept: OCCUPATIONAL THERAPY | Facility: CLINIC | Age: 53
End: 2023-09-22
Payer: COMMERCIAL

## 2023-10-29 NOTE — PROGRESS NOTES
Assessment and Plan:   Mr. Xavier Emmanuel is a 26-year-old male with history significant for psoriasis and psoriatic arthritis who presents for a follow up. He is currently on subcutaneous Enbrel 50 mg weekly. # Psoriatic arthritis and psoriasis  - Nhan Barber presents today for a follow-up of psoriatic arthritis and psoriasis which is currently managed with subcutaneous Enbrel 50 mg once weekly and meloxicam 15 mg daily as needed which he takes very sparingly. He seems to be stable overall without any significant complaints and as a result I recommend continuing the same regimen unchanged. He will update high-risk medication lab monitoring next year. # Cervical degenerative arthritis  - He was found to have mild degenerative arthritis of his cervical spine in 2017 and it is possible that this may have progressed leading to the periodic neck pain and radicular symptoms he has been experiencing. I do suspect that the majority of his neck pain may also be muscular in nature. He will continue with conservative measures at home for now, but I requested he contact the office if he would like to consider repeat cervical spine imaging or a referral to Pain Management. Plan:  Diagnoses and all orders for this visit:    Psoriatic arthritis ([address])  -     C-reactive protein; Future  -     Sedimentation rate, automated; Future    Psoriasis    Long-term use of immunosuppressant medication  -     CBC and differential; Future  -     Comprehensive metabolic panel; Future    Primary generalized (osteo)arthritis    Osteoarthritis of cervical spine, unspecified spinal osteoarthritis complication status      Activities as tolerated. Exercise: try to maintain a low impact exercise regimen as much as possible. Walk for 30 minutes a day for at least 3 days a week. Continue other medications as prescribed by PCP and other specialists. RTC in 1 year.        HPI    INITIAL VISIT NOTE:  Mr. Xavier Emmanuel is a 18-year-old male with history significant for psoriatic arthritis and obesity, who presents for further management of psoriatic arthritis. He is transitioning care from Dr. Nicole Love to [facility]. He is currently on subcutaneous Enbrel 50 mg weekly. Patient states he was diagnosed with psoriatic arthritis in the mid 1990s, when he presented with severe pain and swelling of his feet. He states the symptoms progressed rapidly over time to the point where he was using crutches for ambulating. He was seen by multiple specialists including Podiatry and Orthopedics, and treated for plantar fasciitis without any relief. He also underwent an EMG/NCS study of his lower extremities which was unrevealing. Eventually he was referred to a rheumatologist and immediately diagnosed with psoriatic arthritis. He states initially he was started on an oral DMARD but cannot recall the name. He states it made him very sun sensitive so it was discontinued. Following this he was started on subcutaneous Enbrel 50 mg weekly which he has been maintained on since then. He has been tolerating the Enbrel well without any noticeable side effects and there have been no concerns for recurrent infections. He states briefly he was switched from Enbrel to Humira for a short period of time, but for various reasons he disliked the Humira and was eventually transitioned back to Enbrel. He states the change was made due to concerns for continued inflammation at a few finger joints. Currently he denies any joint pains, and states he does not experience any frequent flare-ups. On occasion the only joints that do bother him is his left hand thumb, index and middle fingers. He states the pain resolves spontaneously. Recently he did have an episode of right knee swelling after prolonged activities and was seen by his orthopedic doctor who thought it was related to the activities he was doing.   He was administered an intra-articular cortisone injection which helped him. Since then he has not experienced any joint swelling. He does experience morning stiffness which affects him diffusely but more commonly affects his back, which he states lasts a few minutes. There has been no requirement for him to take any over-the-counter pain medications. He has also not had any significant flare ups in the psoriasis. He denies any other complaints at today's visit. 4/16/2019:  Patient presents for follow up of psoriatic arthritis. He is currently on subcutaneous Enbrel 50 mg weekly. We reviewed the tests done following his prior office visit which showed an unremarkable CBC, CMP, ESR, chronic hepatitis panel, QuantiFERON, rheumatoid factor, anti-CCP antibody and uric acid. CRP was mildly elevated at 5.9. 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 XR of the right hand showed osteoarthritis at the [facility], as well as osteoarthritis of the 2nd and 3rd MCP joints. He has been tolerating the Enbrel well without any concerns for side effects or recurrent infections. He denies fevers, weight loss, new skin rash or mouth/nose ulcers. He has a chronic itchy skin rash on his right lower leg which he states even though is present, seems to be better controlled on the Enbrel. This has previously been attributed to psoriasis. No other skin rashes noted. His joint pains have overall been well controlled, although intermittently he will experience pain affecting his hands (predominantly the left thumb) and more recently his right elbow. 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 The symptoms do not occur everyday. No other significant joint pains noted. No swelling noted. He reports morning stiffness which affects him diffusely and lasts a few minutes. 10/3/2019:  Patient presents for a follow-up of psoriatic arthritis. He is currently on subcutaneous Enbrel 50 milligram weekly. Patient reports he is currently doing well, and denies any joint pains, swelling or significant morning stiffness. He does experience some mild diffuse stiffness when he first wakes up that improves after he takes a hot shower. He did take the meloxicam as needed for the right elbow epicondylitis, and states that his symptoms significantly improved. He was experiencing a flare-up of pain affecting his right knee and was seen by Orthopedics and underwent an MRI. I do not have the reports available to me, but he was advised that there may be a very minor hairline fracture. Surgical options were discussed with him, but patient opted not to pursue this during his work season. Currently he is being managed with meloxicam 15 milligram daily as needed, and states that this has significantly been helping with the right knee and back pain. He is doing well at this time. He has been tolerating the Enbrel well without any concerns for side effects or infections. 1/27/2020:  Patient presents for a follow-up of psoriatic arthritis. He is currently on subcutaneous Enbrel 50 mg once weekly. I reviewed his recently done CBC and CMP which were unremarkable. Patient reports he has not had any significant flare-up in his joint pains since the last office visit. He continues to experience occasional pain and swelling at his bilateral thumbs, which is not new, and is slightly improved from the last office visit. There have been no new joint pains, swelling or stiffness. Recently he has been experiencing periodic flare-ups of neck pain associated with stiffness. He is known to have mild degenerative arthritis of his cervical spine. He was seen by his primary care physician and prescribed a Medrol Dosepak which did help.   He mentions he will continue with conservative measures at home to manage his neck pain, and contact the office if he would like repeat evaluation with neck imaging or referral to the [facility]. He has been tolerating the Enbrel well without any concerns for side effects or recurrent infections. No new complaints noted today. 1/21/2021:  Patient presents for a follow-up of psoriatic arthritis. He is currently on subcutaneous Enbrel 50 mg once weekly. I reviewed his recently done CBC, CMP, ESR and CRP which were unremarkable. The lipid panel showed a mildly elevated LDL of 138. He reports over the past few months he feels like his joint pains have been flaring up. He particularly experienced symptoms affecting his bilateral knees for which he followed with Orthopedics. He had intra-articular cortisone injections administered which did not help. He completed a series of viscosupplementation which has been helping. There are plans to delay surgery as much as possible for the knee osteoarthritis. He reports intermittent pain that still affect his left thumb. He was also seeing Podiatry for pain on the anterior and lateral aspect of his right foot with concerns for a stress fracture. He reports occasional pain that also affect his bilateral shoulders. He denies any joint pains throughout his hands otherwise, wrists, elbows, hips, ankles or left foot. He denies swollen joints. He does experience morning stiffness which affects him diffusely and takes about 1 hour to improve. He has been utilizing the meloxicam very sparingly and has not been on any recent steroids. He reports the psoriasis has been stable but occasionally the lesion on his right lower leg may flare-up and he may notice small patches on other areas of his body. He has been tolerating the Enbrel well without any concerns for side effects or infections. 2/4/2022:  Patient presents for a follow-up of psoriatic arthritis and psoriasis.   He is currently on subcutaneous Enbrel 50 mg once weekly. He reports overall he has been stable without any significant flare ups. He still feels stiff in the morning and states on occasion this can last more than 1 hour. No worsening joint pains or swelling. He has had a flare up in the psoriasis. He has been tolerating the Enbrel well without any concerns for side effects or infections. 10/27/2022:  Patient presents for a follow-up of psoriatic arthritis and psoriasis. He is currently on subcutaneous Enbrel 50 mg once weekly. He reports overall he has been stable without any significant flare ups. He still feels stiff in the morning and states on occasion this can last more than 1 hour. No worsening joint pains or swelling. He takes meloxicam 15 mg once a day as needed which helps him and this has been infrequently. He has a psoriasis patch on his leg but no new patches. He has been tolerating the Enbrel well without any concerns for side effects or infections. 10/30/2023:  Patient presents for a follow-up of psoriatic arthritis and psoriasis. He is currently on subcutaneous Enbrel 50 mg once weekly. I reviewed his labs from January which showed an unremarkable CBC and stable CMP with slight elevation in his liver enzymes. A C-reactive protein was normal.  An ESR was slightly elevated at 43. He reports overall he has been stable without any significant flare ups. He still feels stiff in the morning and states on occasion this can last more than 1 hour. No worsening joint pains or swelling. He takes meloxicam 15 mg once a day as needed which helps him and this has been infrequently. He gets occasional psoriasis patches but nothing that is consistent. 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       He has been tolerating the Enbrel well without any concerns for side effects or infections. The following portions of the patient's history were reviewed and updated as appropriate: allergies, current medications, past family history, past medical history, past social history, past surgical history and problem list.      Review of Systems  Constitutional: Negative for weight change, fevers, chills, night sweats, fatigue. ENT/Mouth: Negative for hearing changes, ear pain, nasal congestion, sinus pain, hoarseness, sore throat, rhinorrhea, swallowing difficulty. Eyes: Negative for pain, redness, discharge, vision changes. Cardiovascular: Negative for chest pain, SOB, palpitations. Respiratory: Negative for cough, sputum, wheezing, dyspnea. Gastrointestinal: Negative for nausea, vomiting, diarrhea, constipation, pain, heartburn. Genitourinary: Negative for dysuria, urinary frequency, hematuria. Musculoskeletal: As per HPI. Skin: Negative for skin rash currently, color changes. Neuro: Negative for weakness, numbness, tingling, loss of consciousness. Psych: Negative for anxiety, depression. Heme/Lymph: Negative for easy bruising, bleeding, lymphadenopathy.         Past Medical History:   Diagnosis Date    GERD (gastroesophageal reflux disease)     Hyperlipidemia     Hypertension     Psoriatic arthritis ([address])        Past Surgical History:   Procedure Laterality Date    COLLATERAL LIGAMENT REPAIR, ELBOW Right 7/7/2023    Procedure: LATERAL COLLATERAL LIGAMENT REPAIR WITH INTERNAL BRACE AUGMENTATION;  Surgeon: Laron Rodas;  Location:  MAIN OR;  Service: Orthopedics    HIP SURGERY Bilateral        Social History     Socioeconomic History    Marital status: /Civil Union     Spouse name: Not on file    Number of children: Not on file    Years of education: Not on file    Highest education level: Not on file   Occupational History    Not on file   Tobacco Use    Smoking status: Never    Smokeless tobacco: Never   Vaping Use    Vaping Use: Never used   Substance and Sexual Activity    Alcohol use: Yes     Comment: weekly    Drug use: No    Sexual activity: Not on file   Other Topics Concern    Not on file   Social History Narrative    Not on file     Social Determinants of Health     Financial Resource Strain: Not on file   Food Insecurity: Not on file   Transportation Needs: Not on file   Physical Activity: Not on file   Stress: Not on file   Social Connections: Not on file   Intimate Partner Violence: Not on file   Housing Stability: Not on file       Family History   Problem Relation Age of Onset    No Known Problems Mother     No Known Problems Father     No Known Problems Sister     No Known Problems Brother     No Known Problems Daughter     No Known Problems Maternal Grandmother     No Known Problems Maternal Grandfather     No Known Problems Paternal Grandmother     No Known Problems Paternal Grandfather     No Known Problems Maternal Aunt     No Known Problems Maternal Uncle     No Known Problems Paternal Aunt     No Known Problems Paternal Uncle     No Known Problems Cousin     Alcohol abuse Neg Hx     Arthritis Neg Hx     Dementia Neg Hx     Cancer Neg Hx     COPD Neg Hx     Depression Neg Hx     Lupus Neg Hx     Drug abuse Neg Hx     Early death Neg Hx     Hearing loss Neg Hx     Hyperlipidemia Neg Hx     Learning disabilities Neg Hx     Substance Abuse Neg Hx     Stroke Neg Hx     Vision loss Neg Hx     Pulmonary embolism Neg Hx     Deep vein thrombosis Neg Hx     Mental illness Neg Hx     Crohn's disease Neg Hx     Rheum arthritis Neg Hx     Psoriasis Neg Hx     Asthma Neg Hx        No Known Allergies      Current Outpatient Medications:     esomeprazole (NexIUM) 40 MG capsule, Take 1 capsule (40 mg total) by mouth in the morning, Disp: 90 capsule, Rfl: 2    etanercept (Enbrel) 50 mg/mL SOSY, Inject 1 mL (50 mg total) under the skin once a week, Disp: 4 mL, Rfl: 5    etanercept (Enbrel) 50 mg/mL SOSY, Inject 1 mL (50 mg) under the skin once a week, Disp: 4 mL, Rfl: 5 Evolocumab (Repatha SureClick) 205 MG/ML SOAJ, Inject 140 mg under the skin every 14 (fourteen) days, Disp: , Rfl:     folic acid (FOLVITE) 1 mg tablet, , Disp: , Rfl:     Ibuprofen 200 MG CAPS, Ibuprofen 200 MG Oral Capsule TAKE CAPSULE  PRN as needed   Refills: 0     Active, Disp: , Rfl:     lisinopril (ZESTRIL) 5 mg tablet, Take 5 mg by mouth daily, Disp: , Rfl:     Testosterone 12.5 MG/ACT (1%) GEL, , Disp: , Rfl:     benzonatate (TESSALON) 200 MG capsule, Take 1 capsule (200 mg total) by mouth 3 (three) times a day as needed for cough (Patient not taking: Reported on 8/16/2023), Disp: 20 capsule, Rfl: 0    Diclofenac Sodium (VOLTAREN) 1 %, Apply 2 g topically 4 (four) times a day, Disp: 2 g, Rfl: 3    ergocalciferol (ERGOCALCIFEROL) 1.25 MG (72358 UT) capsule, Take 50,000 Units by mouth every 7 days, Disp: , Rfl:     esomeprazole (NexIUM) 40 MG capsule, Take 1 capsule (40 mg total) by mouth daily, Disp: 90 capsule, Rfl: 1      Objective:    Vitals:    10/30/23 1456   BP: 130/84   Weight: 133 kg (294 lb)   Height: 5' 9" (1.753 m)       Physical Exam  General: Well appearing, well nourished, in no distress. Oriented x 3, normal mood and affect. Ambulating without difficulty. Skin: Good turgor, no rash, unusual bruising or prominent lesions. Pigmented area as a result of prior psoriasis noted on his right lower extremity laterally. Dry skin on his hands. Hair: Normal texture and distribution. HEENT:  Head: Normocephalic, atraumatic. Eyes: Conjunctiva clear, sclera non-icteric, EOM intact. Nose: No external lesions, mucosa non-inflamed. Extremities: No amputations or deformities, cyanosis, edema. Musculoskeletal: No swelling visualized. Neurologic: Alert and oriented. No focal neurological deficits appreciated. Psychiatric: Normal mood and affect. Quang Hill M.D.   Rheumatology

## 2023-10-30 ENCOUNTER — OFFICE VISIT (OUTPATIENT)
Dept: RHEUMATOLOGY | Facility: CLINIC | Age: 53
End: 2023-10-30
Payer: COMMERCIAL

## 2023-10-30 VITALS
BODY MASS INDEX: 43.55 KG/M2 | HEIGHT: 69 IN | WEIGHT: 294 LBS | DIASTOLIC BLOOD PRESSURE: 84 MMHG | SYSTOLIC BLOOD PRESSURE: 130 MMHG

## 2023-10-30 DIAGNOSIS — Z79.60 LONG-TERM USE OF IMMUNOSUPPRESSANT MEDICATION: ICD-10-CM

## 2023-10-30 DIAGNOSIS — M47.812 OSTEOARTHRITIS OF CERVICAL SPINE, UNSPECIFIED SPINAL OSTEOARTHRITIS COMPLICATION STATUS: ICD-10-CM

## 2023-10-30 DIAGNOSIS — M15.0 PRIMARY GENERALIZED (OSTEO)ARTHRITIS: ICD-10-CM

## 2023-10-30 DIAGNOSIS — L40.9 PSORIASIS: ICD-10-CM

## 2023-10-30 DIAGNOSIS — L40.50 PSORIATIC ARTHRITIS (HCC): Primary | ICD-10-CM

## 2023-10-30 PROCEDURE — 99214 OFFICE O/P EST MOD 30 MIN: CPT | Performed by: INTERNAL MEDICINE

## 2023-11-02 ENCOUNTER — OFFICE VISIT (OUTPATIENT)
Dept: URGENT CARE | Facility: CLINIC | Age: 53
End: 2023-11-02
Payer: COMMERCIAL

## 2023-11-02 VITALS
OXYGEN SATURATION: 95 % | HEART RATE: 84 BPM | TEMPERATURE: 97.5 F | SYSTOLIC BLOOD PRESSURE: 136 MMHG | DIASTOLIC BLOOD PRESSURE: 83 MMHG | RESPIRATION RATE: 18 BRPM

## 2023-11-02 DIAGNOSIS — J06.9 VIRAL URI WITH COUGH: Primary | ICD-10-CM

## 2023-11-02 PROCEDURE — 99213 OFFICE O/P EST LOW 20 MIN: CPT | Performed by: FAMILY MEDICINE

## 2023-11-02 RX ORDER — BENZONATATE 200 MG/1
200 CAPSULE ORAL 3 TIMES DAILY PRN
Qty: 20 CAPSULE | Refills: 0 | Status: SHIPPED | OUTPATIENT
Start: 2023-11-02

## 2023-11-02 RX ORDER — FLUTICASONE PROPIONATE 50 MCG
1 SPRAY, SUSPENSION (ML) NASAL 2 TIMES DAILY
Qty: 16 G | Refills: 0 | Status: SHIPPED | OUTPATIENT
Start: 2023-11-02 | End: 2023-11-07

## 2023-11-02 NOTE — PROGRESS NOTES
[facility]        NAME: Tamir Mascorro is a 45 y.o. male  : 1970    MRN: [id number]  DATE: 2023  TIME: 11:08 AM    Assessment and Plan   Viral URI with cough [J06.9]  1. Viral URI with cough  fluticasone (FLONASE) 50 mcg/act nasal spray    benzonatate (TESSALON) 200 MG capsule        Infra-auricular pain likely eustachian tube dysfunction associated with postnasal drip from rhinitis. Flonase prescribed and Tessalon Perles for symptom relief. Continue with other supportive measures. Patient Instructions     Follow up with PCP in 3-5 days. Proceed to  ER if symptoms worsen. Chief Complaint     Chief Complaint   Patient presents with    Cold Like Symptoms     Patient reports cold like symptoms to include sore throat/cough/congestion. He states it has been going on for over week. Denies any fevers. Works in [address] [place], [place] [address] where other works have been sick. History of Present Illness       49-year-old male presents today with over a week of URI symptoms including nasal congestion, sore throat and coughing is worse at nighttime and in the morning upon waking up. Denies any obvious fevers, chills, chest pain, dyspnea or abdominal symptoms. Plans on taking a family trip next week to [place], [place]. Review of Systems   Review of Systems   Constitutional:  Positive for fever. Negative for chills. HENT:  Positive for congestion, postnasal drip, rhinorrhea and sore throat. Negative for ear pain and sinus pressure. Respiratory:  Positive for cough. Negative for shortness of breath and wheezing. Cardiovascular:  Negative for chest pain. Gastrointestinal:  Negative for abdominal pain and nausea. Musculoskeletal:  Negative for arthralgias. Skin:  Negative for rash. Neurological:  Positive for headaches. Negative for dizziness.      Current Medications       Current Outpatient Medications:     benzonatate (TESSALON) 200 MG capsule, Take 1 capsule (200 mg total) by mouth 3 (three) times a day as needed for cough, Disp: 20 capsule, Rfl: 0    ergocalciferol (ERGOCALCIFEROL) 1.25 MG (24287 UT) capsule, Take 50,000 Units by mouth every 7 days, Disp: , Rfl:     esomeprazole (NexIUM) 40 MG capsule, Take 1 capsule (40 mg total) by mouth in the morning, Disp: 90 capsule, Rfl: 2    etanercept (Enbrel) 50 mg/mL SOSY, Inject 1 mL (50 mg total) under the skin once a week, Disp: 4 mL, Rfl: 5    etanercept (Enbrel) 50 mg/mL SOSY, Inject 1 mL (50 mg) under the skin once a week, Disp: 4 mL, Rfl: 5    Evolocumab (Repatha SureClick) 947 MG/ML SOAJ, Inject 140 mg under the skin every 14 (fourteen) days, Disp: , Rfl:     fluticasone (FLONASE) 50 mcg/act nasal spray, 1 spray into each nostril 2 (two) times a day for 5 days, Disp: 16 g, Rfl: 0    folic acid (FOLVITE) 1 mg tablet, , Disp: , Rfl:     lisinopril (ZESTRIL) 5 mg tablet, Take 5 mg by mouth daily, Disp: , Rfl:     Testosterone 12.5 MG/ACT (1%) GEL, , Disp: , Rfl:     benzonatate (TESSALON) 200 MG capsule, Take 1 capsule (200 mg total) by mouth 3 (three) times a day as needed for cough (Patient not taking: Reported on 8/16/2023), Disp: 20 capsule, Rfl: 0    Diclofenac Sodium (VOLTAREN) 1 %, Apply 2 g topically 4 (four) times a day, Disp: 2 g, Rfl: 3    esomeprazole (NexIUM) 40 MG capsule, Take 1 capsule (40 mg total) by mouth daily, Disp: 90 capsule, Rfl: 1    Ibuprofen 200 MG CAPS, Ibuprofen 200 MG Oral Capsule TAKE CAPSULE  PRN as needed   Refills: 0     Active, Disp: , Rfl:     Current Allergies     Allergies as of 11/02/2023    (No Known Allergies)            The following portions of the patient's history were reviewed and updated as appropriate: allergies, current medications, past family history, past medical history, past social history, past surgical history and problem list.     Past Medical History:   Diagnosis Date    GERD (gastroesophageal reflux disease)     Hyperlipidemia     Hypertension     Psoriatic arthritis [facility])        Past Surgical History:   Procedure Laterality Date    COLLATERAL LIGAMENT REPAIR, ELBOW Right 7/7/2023    Procedure: LATERAL COLLATERAL LIGAMENT REPAIR WITH INTERNAL BRACE AUGMENTATION;  Surgeon: Tony Barraza;  Location:  MAIN OR;  Service: Orthopedics    HIP SURGERY Bilateral        Family History   Problem Relation Age of Onset    No Known Problems Mother     No Known Problems Father     No Known Problems Sister     No Known Problems Brother     No Known Problems Daughter     No Known Problems Maternal Grandmother     No Known Problems Maternal Grandfather     No Known Problems Paternal Grandmother     No Known Problems Paternal Grandfather     No Known Problems Maternal Aunt     No Known Problems Maternal Uncle     No Known Problems Paternal Aunt     No Known Problems Paternal Uncle     No Known Problems Cousin     Alcohol abuse Neg Hx     Arthritis Neg Hx     Dementia Neg Hx     Cancer Neg Hx     COPD Neg Hx     Depression Neg Hx     Lupus Neg Hx     Drug abuse Neg Hx     Early death Neg Hx     Hearing loss Neg Hx     Hyperlipidemia Neg Hx     Learning disabilities Neg Hx     Substance Abuse Neg Hx     Stroke Neg Hx     Vision loss Neg Hx     Pulmonary embolism Neg Hx     Deep vein thrombosis Neg Hx     Mental illness Neg Hx     Crohn's disease Neg Hx     Rheum arthritis Neg Hx     Psoriasis Neg Hx     Asthma Neg Hx          Medications have been verified. Objective   /83   Pulse 84   Temp 97.5 °F (36.4 °C)   Resp 18   SpO2 95%   No LMP for male patient. Physical Exam     Physical Exam  Vitals and nursing note reviewed. Constitutional:       General: He is in acute distress. Appearance: Normal appearance. He is not ill-appearing or toxic-appearing. HENT:      Head: Normocephalic and atraumatic. Right Ear: Tympanic membrane and ear canal normal.      Left Ear: Tympanic membrane and ear canal normal.      Nose: Congestion and rhinorrhea present.       Comments: Inflamed nasal mucosa     Mouth/Throat:      Mouth: Mucous membranes are moist.      Pharynx: No posterior oropharyngeal erythema. Eyes:      General:         Right eye: No discharge. Left eye: No discharge. Conjunctiva/sclera: Conjunctivae normal.   Cardiovascular:      Rate and Rhythm: Normal rate and regular rhythm. Pulmonary:      Effort: Pulmonary effort is normal. No respiratory distress. Breath sounds: Normal breath sounds. No wheezing, rhonchi or rales. Skin:     General: Skin is warm. Findings: No erythema. Neurological:      General: No focal deficit present. Mental Status: He is alert and oriented to person, place, and time. Psychiatric:         Mood and Affect: Mood normal.         Behavior: Behavior normal.         Thought Content:  Thought content normal.         Judgment: Judgment normal.

## 2023-11-07 ENCOUNTER — OFFICE VISIT (OUTPATIENT)
Dept: URGENT CARE | Facility: CLINIC | Age: 53
End: 2023-11-07
Payer: COMMERCIAL

## 2023-11-07 VITALS
TEMPERATURE: 99.1 F | OXYGEN SATURATION: 99 % | HEART RATE: 91 BPM | BODY MASS INDEX: 44.01 KG/M2 | RESPIRATION RATE: 16 BRPM | WEIGHT: 298 LBS

## 2023-11-07 DIAGNOSIS — B96.89 ACUTE BACTERIAL SINUSITIS: Primary | ICD-10-CM

## 2023-11-07 DIAGNOSIS — J01.90 ACUTE BACTERIAL SINUSITIS: Primary | ICD-10-CM

## 2023-11-07 PROCEDURE — 99213 OFFICE O/P EST LOW 20 MIN: CPT | Performed by: FAMILY MEDICINE

## 2023-11-07 RX ORDER — FOLIC ACID 1 MG/1
1 TABLET ORAL DAILY
COMMUNITY
Start: 2023-07-27 | End: 2024-07-26

## 2023-11-07 RX ORDER — AZITHROMYCIN 250 MG/1
TABLET, FILM COATED ORAL
Qty: 6 TABLET | Refills: 0 | Status: SHIPPED | OUTPATIENT
Start: 2023-11-07 | End: 2023-11-11

## 2023-11-07 NOTE — PROGRESS NOTES
[facility]        NAME: Nhi Stanford is a 45 y.o. male  : 1970    MRN: [id number]  DATE: 2023  TIME: 1:17 PM    Assessment and Plan   Acute bacterial sinusitis [J01.90, B96.89]  1. Acute bacterial sinusitis  azithromycin (ZITHROMAX) 250 mg tablet        Concerns for possible bacterial pan-sinusitis due to the presence of fevers and chills. We will treat with azithromycin. May continue to benefit with Flonase. Advised on hydrating with plenty of fluids and Tylenol and Motrin for pain and fever control. Patient Instructions     Follow up with PCP in 3-5 days. Proceed to  ER if symptoms worsen. Chief Complaint     Chief Complaint   Patient presents with    Cold Like Symptoms     Pt presents with head congestion, not feeling better since last weeks office visit         History of Present Illness     17-year-old male presents today due to concerns for possible sinus infection. Has been experiencing a lot of of sinus pain and pressure associated with fevers, chills and diaphoresis especially last night. Of note, has been seen last week due to a viral URI which have been treated with Flonase and Tessalon Perles. Did respond favorably with decreased otalgia and nasal symptoms. Review of Systems   Review of Systems   Constitutional:  Positive for chills, diaphoresis and fever. Negative for fatigue. HENT:  Positive for congestion, sinus pressure and sinus pain. Negative for ear pain. Respiratory:  Positive for cough. Negative for shortness of breath and wheezing. Cardiovascular:  Negative for chest pain. Gastrointestinal:  Negative for abdominal pain and nausea. Musculoskeletal:  Negative for myalgias. Neurological:  Positive for headaches. Negative for dizziness. Psychiatric/Behavioral:  Positive for sleep disturbance.       Current Medications       Current Outpatient Medications:     azithromycin (ZITHROMAX) 250 mg tablet, Take 2 tablets today then 1 tablet daily x 4 days, Disp: 6 tablet, Rfl: 0    benzonatate (TESSALON) 200 MG capsule, Take 1 capsule (200 mg total) by mouth 3 (three) times a day as needed for cough, Disp: 20 capsule, Rfl: 0    esomeprazole (NexIUM) 40 MG capsule, Take 1 capsule (40 mg total) by mouth in the morning, Disp: 90 capsule, Rfl: 2    etanercept (Enbrel) 50 mg/mL SOSY, Inject 1 mL (50 mg) under the skin once a week, Disp: 4 mL, Rfl: 5    Evolocumab (Repatha SureClick) 704 MG/ML SOAJ, Inject 140 mg under the skin every 14 (fourteen) days, Disp: , Rfl:     fluticasone (FLONASE) 50 mcg/act nasal spray, 1 spray into each nostril 2 (two) times a day for 5 days, Disp: 16 g, Rfl: 0    folic acid (FOLVITE) 1 mg tablet, , Disp: , Rfl:     folic acid (FOLVITE) 1 mg tablet, Take 1 mg by mouth daily, Disp: , Rfl:     lisinopril (ZESTRIL) 5 mg tablet, Take 5 mg by mouth daily, Disp: , Rfl:     Testosterone 12.5 MG/ACT (1%) GEL, , Disp: , Rfl:     ergocalciferol (ERGOCALCIFEROL) 1.25 MG (21299 UT) capsule, Take 50,000 Units by mouth every 7 days, Disp: , Rfl:     Current Allergies     Allergies as of 11/07/2023    (No Known Allergies)            The following portions of the patient's history were reviewed and updated as appropriate: allergies, current medications, past family history, past medical history, past social history, past surgical history and problem list.     Past Medical History:   Diagnosis Date    GERD (gastroesophageal reflux disease)     Hyperlipidemia     Hypertension     Psoriatic arthritis ([address])        Past Surgical History:   Procedure Laterality Date    COLLATERAL LIGAMENT REPAIR, ELBOW Right 7/7/2023    Procedure: LATERAL COLLATERAL LIGAMENT REPAIR WITH INTERNAL BRACE AUGMENTATION;  Surgeon: Fabiola Lozada;  Location:  MAIN OR;  Service: Orthopedics    HIP SURGERY Bilateral        Family History   Problem Relation Age of Onset    No Known Problems Mother     No Known Problems Father     No Known Problems Sister     No Known Problems Brother     No Known Problems Daughter     No Known Problems Maternal Aunt     No Known Problems Maternal Uncle     No Known Problems Paternal Aunt     No Known Problems Paternal Uncle     No Known Problems Maternal Grandmother     No Known Problems Maternal Grandfather     No Known Problems Paternal Grandmother     No Known Problems Paternal Grandfather     No Known Problems Cousin     Alcohol abuse Neg Hx     Arthritis Neg Hx     Dementia Neg Hx     Cancer Neg Hx     COPD Neg Hx     Depression Neg Hx     Lupus Neg Hx     Drug abuse Neg Hx     Early death Neg Hx     Hearing loss Neg Hx     Hyperlipidemia Neg Hx     Learning disabilities Neg Hx     Substance Abuse Neg Hx     Stroke Neg Hx     Vision loss Neg Hx     Pulmonary embolism Neg Hx     Deep vein thrombosis Neg Hx     Mental illness Neg Hx     Crohn's disease Neg Hx     Rheum arthritis Neg Hx     Psoriasis Neg Hx     Asthma Neg Hx          Medications have been verified. Objective   Pulse 91   Temp 99.1 °F (37.3 °C)   Resp 16   Wt 135 kg (298 lb)   SpO2 99%   BMI 44.01 kg/m²   No LMP for male patient. Physical Exam     Physical Exam  Vitals and nursing note reviewed. Constitutional:       General: He is in acute distress. Appearance: Normal appearance. He is not ill-appearing, toxic-appearing or diaphoretic. HENT:      Head: Normocephalic and atraumatic. Nose:      Comments: Inflamed nasal mucosa     Mouth/Throat:      Mouth: Mucous membranes are moist.      Pharynx: No oropharyngeal exudate or posterior oropharyngeal erythema. Eyes:      General:         Right eye: No discharge. Left eye: No discharge. Conjunctiva/sclera: Conjunctivae normal.   Cardiovascular:      Rate and Rhythm: Normal rate and regular rhythm. Pulmonary:      Effort: Pulmonary effort is normal. No respiratory distress. Breath sounds: Normal breath sounds. No wheezing, rhonchi or rales. Skin:     General: Skin is warm.       Findings: No erythema. Neurological:      General: No focal deficit present. Mental Status: He is alert and oriented to person, place, and time. Psychiatric:         Mood and Affect: Mood normal.         Behavior: Behavior normal.         Thought Content:  Thought content normal.         Judgment: Judgment normal.

## 2024-01-05 LAB
ALBUMIN SERPL-MCNC: 4.9 G/DL (ref 3.8–4.9)
ALBUMIN/GLOB SERPL: 1.6 {RATIO} (ref 1.2–2.2)
ALP SERPL-CCNC: 45 IU/L (ref 44–121)
ALT SERPL-CCNC: 42 IU/L (ref 0–44)
AST SERPL-CCNC: 39 IU/L (ref 0–40)
BASOPHILS # BLD AUTO: 0 X10E3/UL (ref 0–0.2)
BASOPHILS NFR BLD AUTO: 1 %
BILIRUB SERPL-MCNC: 0.7 MG/DL (ref 0–1.2)
BUN SERPL-MCNC: 12 MG/DL (ref 6–24)
BUN/CREAT SERPL: 13 (ref 9–20)
CALCIUM SERPL-MCNC: 9.9 MG/DL (ref 8.7–10.2)
CHLORIDE SERPL-SCNC: 99 MMOL/L (ref 96–106)
CO2 SERPL-SCNC: 23 MMOL/L (ref 20–29)
CREAT SERPL-MCNC: 0.92 MG/DL (ref 0.76–1.27)
CRP SERPL-MCNC: 5 MG/L (ref 0–10)
EGFR: 98 ML/MIN/1.73
EOSINOPHIL # BLD AUTO: 0.2 X10E3/UL (ref 0–0.4)
EOSINOPHIL NFR BLD AUTO: 3 %
ERYTHROCYTE [DISTWIDTH] IN BLOOD BY AUTOMATED COUNT: 13 % (ref 11.6–15.4)
ERYTHROCYTE [SEDIMENTATION RATE] IN BLOOD BY WESTERGREN METHOD: 27 MM/HR (ref 0–30)
GLOBULIN SER-MCNC: 3 G/DL (ref 1.5–4.5)
GLUCOSE SERPL-MCNC: 83 MG/DL (ref 70–99)
HCT VFR BLD AUTO: 46.4 % (ref 37.5–51)
HGB BLD-MCNC: 15.5 G/DL (ref 13–17.7)
IMM GRANULOCYTES # BLD: 0 X10E3/UL (ref 0–0.1)
IMM GRANULOCYTES NFR BLD: 0 %
LYMPHOCYTES # BLD AUTO: 2 X10E3/UL (ref 0.7–3.1)
LYMPHOCYTES NFR BLD AUTO: 34 %
MCH RBC QN AUTO: 29.6 PG (ref 26.6–33)
MCHC RBC AUTO-ENTMCNC: 33.4 G/DL (ref 31.5–35.7)
MCV RBC AUTO: 89 FL (ref 79–97)
MONOCYTES # BLD AUTO: 0.6 X10E3/UL (ref 0.1–0.9)
MONOCYTES NFR BLD AUTO: 10 %
NEUTROPHILS # BLD AUTO: 3 X10E3/UL (ref 1.4–7)
NEUTROPHILS NFR BLD AUTO: 52 %
PLATELET # BLD AUTO: 226 X10E3/UL (ref 150–450)
POTASSIUM SERPL-SCNC: 4.7 MMOL/L (ref 3.5–5.2)
PROT SERPL-MCNC: 7.9 G/DL (ref 6–8.5)
RBC # BLD AUTO: 5.23 X10E6/UL (ref 4.14–5.8)
SODIUM SERPL-SCNC: 139 MMOL/L (ref 134–144)
WBC # BLD AUTO: 5.8 X10E3/UL (ref 3.4–10.8)

## 2024-01-10 ENCOUNTER — TELEPHONE (OUTPATIENT)
Dept: INTERNAL MEDICINE CLINIC | Facility: CLINIC | Age: 54
End: 2024-01-10

## 2024-01-10 ENCOUNTER — OFFICE VISIT (OUTPATIENT)
Dept: INTERNAL MEDICINE CLINIC | Facility: CLINIC | Age: 54
End: 2024-01-10
Payer: COMMERCIAL

## 2024-01-10 VITALS
TEMPERATURE: 98 F | BODY MASS INDEX: 42 KG/M2 | SYSTOLIC BLOOD PRESSURE: 130 MMHG | OXYGEN SATURATION: 100 % | HEART RATE: 60 BPM | DIASTOLIC BLOOD PRESSURE: 80 MMHG | WEIGHT: 283.6 LBS | HEIGHT: 69 IN

## 2024-01-10 DIAGNOSIS — E22.1 HYPERPROLACTINEMIA (HCC): ICD-10-CM

## 2024-01-10 DIAGNOSIS — R10.9 RIGHT FLANK PAIN: ICD-10-CM

## 2024-01-10 DIAGNOSIS — R79.89 ABNORMAL LFTS: ICD-10-CM

## 2024-01-10 DIAGNOSIS — N40.1 BENIGN PROSTATIC HYPERPLASIA WITH LOWER URINARY TRACT SYMPTOMS, SYMPTOM DETAILS UNSPECIFIED: ICD-10-CM

## 2024-01-10 DIAGNOSIS — E29.1 HYPOGONADISM IN MALE: ICD-10-CM

## 2024-01-10 DIAGNOSIS — L40.50 PSORIATIC ARTHRITIS (HCC): Primary | ICD-10-CM

## 2024-01-10 DIAGNOSIS — R19.03 RIGHT LOWER QUADRANT ABDOMINAL MASS: ICD-10-CM

## 2024-01-10 PROBLEM — R10.32 LEFT LOWER QUADRANT ABDOMINAL PAIN: Status: RESOLVED | Noted: 2024-01-10 | Resolved: 2024-01-10

## 2024-01-10 PROBLEM — R10.32 LEFT LOWER QUADRANT ABDOMINAL PAIN: Status: ACTIVE | Noted: 2024-01-10

## 2024-01-10 LAB
25(OH)D3+25(OH)D2 SERPL-MCNC: 36.5 NG/ML (ref 30–100)
ALBUMIN SERPL-MCNC: 4.9 G/DL (ref 3.8–4.9)
ALBUMIN/CREAT UR: 4 MG/G CREAT (ref 0–29)
ALBUMIN/GLOB SERPL: 1.8 {RATIO} (ref 1.2–2.2)
ALP SERPL-CCNC: 49 IU/L (ref 44–121)
ALT SERPL-CCNC: 47 IU/L (ref 0–44)
APPEARANCE UR: CLEAR
AST SERPL-CCNC: 44 IU/L (ref 0–40)
BACTERIA URNS QL MICRO: NORMAL
BASOPHILS # BLD AUTO: 0 X10E3/UL (ref 0–0.2)
BASOPHILS NFR BLD AUTO: 0 %
BILIRUB SERPL-MCNC: 0.7 MG/DL (ref 0–1.2)
BILIRUB UR QL STRIP: POSITIVE
BUN SERPL-MCNC: 11 MG/DL (ref 6–24)
BUN/CREAT SERPL: 11 (ref 9–20)
CALCIUM SERPL-MCNC: 9.9 MG/DL (ref 8.7–10.2)
CASTS URNS QL MICRO: NORMAL /LPF
CHLORIDE SERPL-SCNC: 98 MMOL/L (ref 96–106)
CHOLEST SERPL-MCNC: 143 MG/DL (ref 100–199)
CO2 SERPL-SCNC: 22 MMOL/L (ref 20–29)
COLOR UR: YELLOW
CREAT SERPL-MCNC: 0.97 MG/DL (ref 0.76–1.27)
CREAT UR-MCNC: 262.6 MG/DL
EGFR: 92 ML/MIN/1.73
EOSINOPHIL # BLD AUTO: 0.2 X10E3/UL (ref 0–0.4)
EOSINOPHIL NFR BLD AUTO: 3 %
EPI CELLS #/AREA URNS HPF: NORMAL /HPF (ref 0–10)
ERYTHROCYTE [DISTWIDTH] IN BLOOD BY AUTOMATED COUNT: 13 % (ref 11.6–15.4)
GLOBULIN SER-MCNC: 2.8 G/DL (ref 1.5–4.5)
GLUCOSE SERPL-MCNC: 85 MG/DL (ref 70–99)
GLUCOSE UR QL: NEGATIVE
HBA1C MFR BLD: 5.9 % (ref 4.8–5.6)
HCT VFR BLD AUTO: 46.3 % (ref 37.5–51)
HDLC SERPL-MCNC: 63 MG/DL
HGB BLD-MCNC: 15.2 G/DL (ref 13–17.7)
HGB UR QL STRIP: NEGATIVE
IMM GRANULOCYTES # BLD: 0 X10E3/UL (ref 0–0.1)
IMM GRANULOCYTES NFR BLD: 0 %
KETONES UR QL STRIP: ABNORMAL
LDLC SERPL CALC-MCNC: 60 MG/DL (ref 0–99)
LEUKOCYTE ESTERASE UR QL STRIP: NEGATIVE
LYMPHOCYTES # BLD AUTO: 2.1 X10E3/UL (ref 0.7–3.1)
LYMPHOCYTES NFR BLD AUTO: 35 %
MCH RBC QN AUTO: 29.5 PG (ref 26.6–33)
MCHC RBC AUTO-ENTMCNC: 32.8 G/DL (ref 31.5–35.7)
MCV RBC AUTO: 90 FL (ref 79–97)
METHYLMALONATE SERPL-SCNC: 119 NMOL/L (ref 0–378)
MICRO URNS: ABNORMAL
MICRO URNS: ABNORMAL
MICROALBUMIN UR-MCNC: 11.6 UG/ML
MICRODELETION SYND BLD/T FISH: NORMAL
MONOCYTES # BLD AUTO: 0.7 X10E3/UL (ref 0.1–0.9)
MONOCYTES NFR BLD AUTO: 12 %
NEUTROPHILS # BLD AUTO: 3 X10E3/UL (ref 1.4–7)
NEUTROPHILS NFR BLD AUTO: 50 %
NITRITE UR QL STRIP: NEGATIVE
PH UR STRIP: 6 [PH] (ref 5–7.5)
PLATELET # BLD AUTO: 225 X10E3/UL (ref 150–450)
POTASSIUM SERPL-SCNC: 4.4 MMOL/L (ref 3.5–5.2)
PROT SERPL-MCNC: 7.7 G/DL (ref 6–8.5)
PROT UR QL STRIP: NEGATIVE
RBC # BLD AUTO: 5.15 X10E6/UL (ref 4.14–5.8)
RBC #/AREA URNS HPF: NORMAL /HPF (ref 0–2)
SODIUM SERPL-SCNC: 137 MMOL/L (ref 134–144)
SP GR UR: 1.02 (ref 1–1.03)
TESTOST FREE SERPL-MCNC: 7.7 PG/ML (ref 7.2–24)
TESTOST SERPL-MCNC: 206 NG/DL (ref 264–916)
TRIGL SERPL-MCNC: 114 MG/DL (ref 0–149)
UROBILINOGEN UR STRIP-ACNC: 0.2 MG/DL (ref 0.2–1)
WBC # BLD AUTO: 6.1 X10E3/UL (ref 3.4–10.8)
WBC #/AREA URNS HPF: NORMAL /HPF (ref 0–5)

## 2024-01-10 PROCEDURE — 99214 OFFICE O/P EST MOD 30 MIN: CPT | Performed by: INTERNAL MEDICINE

## 2024-01-10 NOTE — ASSESSMENT & PLAN NOTE
Patient on Enbrel 50 mg weekly injection symptoms controlled also to use ibuprofen as needed follow-up with rheumatology dermatology    Agree and continue present management as follows  Enbrel 50 mg weekly injection  Follow-up with the dermatology

## 2024-01-10 NOTE — ASSESSMENT & PLAN NOTE
[facility]  Ref Range & Units 6 d ago   TESTOSTERONE TOTAL  264 - 916 ng/dL 206 Low    Comment: Adult male reference interval is based on a population of  healthy nonobese males (BMI <30) between 19 and 39 years old.  Luc et.al. JCEM 2017,102;0600-5764. PMID: [id number].   Testosterone, Free  7.2 - 24.0 pg/mL 7.7   Above reviewed patient is on testosterone therapy prescribed by urology 1% 12.5 mg twice a day  About testosterone results reviewed discussed with the patient awaiting to be seen by urology

## 2024-01-10 NOTE — PATIENT INSTRUCTIONS
Hyperlipidemia   WHAT YOU NEED TO KNOW:   Hyperlipidemia is a high level of lipids (fats) in your blood. These lipids include cholesterol or triglycerides. Lipids are made by your body. They also come from the foods you eat. Your body needs lipids to work properly, but high levels increase your risk for heart disease, heart attack, and stroke.  DISCHARGE INSTRUCTIONS:   Call your local emergency number (911 in the ) or have someone call if:   You have any of the following signs of a heart attack:      Squeezing, pressure, or pain in your chest    You may  also have any of the following:     Discomfort or pain in your back, neck, jaw, stomach, or arm    Shortness of breath    Nausea or vomiting    Lightheadedness or a sudden cold sweat    You have any of the following signs of a stroke:      Numbness or drooping on one side of your face     Weakness in an arm or leg    Confusion or difficulty speaking    Dizziness, a severe headache, or vision loss    Call your doctor if:   You have questions or concerns about your condition or care.      Medicines:   Medicines  may be given to reduce your cholesterol or triglyceride levels.    Take your medicine as directed.  Contact your healthcare provider if you think your medicine is not helping or if you have side effects. Tell your provider if you are allergic to any medicine. Keep a list of the medicines, vitamins, and herbs you take. Include the amounts, and when and why you take them. Bring the list or the pill bottles to follow-up visits. Carry your medicine list with you in case of an emergency.    Follow up with your doctor as directed:  You may need to return for more tests. Your healthcare provider may refer you to a dietitian or other specialist. Write down your questions so you remember to ask them during your visits.  Manage hyperlipidemia:  Your healthcare provider may first recommend that you make lifestyle changes to help decrease your lipid levels. Your  provider may recommend you work with a team to manage hyperlipidemia. The team may include medical experts such as a dietitian, an exercise or physical therapist, and a behavior therapist. Your family members may be included in helping you create lifestyle changes. You may also need to take medicine to lower your lipid levels. Some of the lifestyle changes you may need to make include the following:  Maintain a healthy weight.  Ask your healthcare provider what a healthy weight is for you. Ask your provider to help you create a weight loss plan, if needed. Weight loss can decrease your cholesterol and triglyceride levels.    Be physically active throughout the day.  Physical activity, such as exercise, lowers your cholesterol levels and helps you maintain a healthy weight. Get 30 minutes or more of aerobic exercise 4 to 6 days each week. You can split your exercise into four 10-minute workouts instead of 30 minutes at one time. Examples of aerobic exercises include walking briskly, swimming, or riding a bike. Work with your healthcare provider to plan the best exercise program for you. Also include strength training at least 2 times each week. Your healthcare providers can help you create a physical activity plan.            Do not smoke.  Nicotine and other chemicals in cigarettes and cigars can increase your risk for a heart attack and stroke. Ask your healthcare provider for information if you currently smoke and need help to quit. E-cigarettes or smokeless tobacco still contain nicotine. Talk to your healthcare provider before you use these products.    Eat heart-healthy foods.  A dietitian or your provider can give you more information on low-sodium plans or the DASH (Dietary Approaches to Stop Hypertension) eating plan. The DASH plan is low in sodium, processed sugar, unhealthy fats, and total fat. It is high in potassium, calcium, and fiber. It is high in potassium, calcium, and fiber. These can be found in  vegetables, fruit, and whole-grain foods. The following are ways to get more heart-healthy foods:         Decrease the total amount of fat you eat.  Choose lean meats, fat-free or 1% fat milk, and low-fat dairy products, such as yogurt and cheese. Limit or do not eat red meat. Red meats are high in fat and cholesterol.    Replace unhealthy fats with healthy fats.  Unhealthy fats include saturated fat, trans fat, and cholesterol. Choose soft margarines that are low in saturated fat and have little or no trans fat. Monounsaturated fats are healthy fats. These are found in olive oil, canola oil, avocado, and nuts. Polyunsaturated fats are also healthy. These are found in fish, flaxseed, walnuts, and soybeans.    Eat 5 or more servings of fruits and vegetables every day.  They are low in calories and fat and a good source of essential vitamins. Include dark green, red, and orange vegetables. Examples include spinach, kale, broccoli, and carrots.    Eat foods high in fiber.  Fiber can help lower your cholesterol levels. Choose whole grain, high-fiber foods. Good choices include whole-wheat breads or cereals, beans, peas, fruits, and vegetables.         Limit sodium (salt) as directed.  Too much sodium can affect your fluid balance and blood pressure. Your healthcare provider will tell you how much sodium and potassium are safe for you to have in a day. Your provider may recommend that you limit sodium to 2,300 mg a day. Your provider or a dietitian can help you find ways to limit sodium. For example, if you add salt while you cook, do not add more salt at the table. Check labels to find low-sodium or no-salt-added foods. Some low-sodium foods use potassium salts for flavor. Too much potassium can also cause health problems.       Ask your healthcare provider if it is okay for you to drink alcohol.  Alcohol can increase your cholesterol and triglyceride levels. Your provider can tell you how many drinks are okay to have  within 24 hours and within 1 week. A drink of alcohol is 12 ounces of beer, 5 ounces of wine, or 1½ ounces of liquor.    © Copyright [employer] 2023 Information is for End User's use only and may not be sold, redistributed or otherwise used for commercial purposes.  The above information is an  only. It is not intended as medical advice for individual conditions or treatments. Talk to your doctor, nurse or pharmacist before following any medical regimen to see if it is safe and effective for you.

## 2024-01-10 NOTE — ASSESSMENT & PLAN NOTE
Suspected fullness right lower quadrant right flank from mobile small mass suspected suspected for last 1 month denies any nausea vomiting diarrhea    Will get CT abdomen pelvis done with without contrast

## 2024-01-10 NOTE — ASSESSMENT & PLAN NOTE
Lab Results   Component Value Date    SODIUM 139 01/04/2024    K 4.7 01/04/2024    CL 99 01/04/2024    CO2 23 01/04/2024    AGAP 8 04/11/2023    BUN 12 01/04/2024    CREATININE 0.92 01/04/2024    GLUC 83 01/04/2024    CALCIUM 9.1 04/11/2023    AST 39 01/04/2024    ALT 42 01/04/2024    TP 7.9 01/04/2024    TBILI 0.7 01/04/2024    EGFR 98 01/04/2024   Asymptomatic  Reviewed as above  Abnormal LFT resolved worse due to morbid obesity this resolves her blood drawn on January 4, 2024

## 2024-01-10 NOTE — ASSESSMENT & PLAN NOTE
Followed by urologist explained the side effect of testosterone causing BPH    Follow-up with the urology for now minimal symptoms of frequency of urination at nighttime

## 2024-01-10 NOTE — ASSESSMENT & PLAN NOTE
For now patient's symptoms controlled instructed about the diet    Continue present management as follows  Nexium 40 mg daily

## 2024-01-10 NOTE — PROGRESS NOTES
Dr. Hartley's Office Visit Note  01/10/24     Christopher Villela 53 y.o. male MRN: [id number]  : 1970    Assessment:     1. Psoriatic arthritis (HCC)  Assessment & Plan:  Patient on Enbrel 50 mg weekly injection symptoms controlled also to use ibuprofen as needed follow-up with rheumatology dermatology    Agree and continue present management as follows  Enbrel 50 mg weekly injection  Follow-up with the dermatology      2. Hyperprolactinemia (HCC)  Assessment & Plan:  Now has resolved no further intervention needed no further treatment needed      3. Right lower quadrant abdominal mass  Assessment & Plan:  Suspected fullness right lower quadrant right flank from mobile small mass suspected suspected for last 1 month denies any nausea vomiting diarrhea    Will get CT abdomen pelvis done with without contrast    Orders:  -     CT abdomen pelvis w contrast; Future; Expected date: 01/10/2024    4. Abnormal LFTs  Assessment & Plan:  Lab Results   Component Value Date    SODIUM 139 2024    K 4.7 2024    CL 99 2024    CO2 23 2024    AGAP 8 2023    BUN 12 2024    CREATININE 0.92 2024    GLUC 83 2024    CALCIUM 9.1 2023    AST 39 2024    ALT 42 2024    TP 7.9 2024    TBILI 0.7 2024    EGFR 98 2024   Asymptomatic  Reviewed as above  Abnormal LFT resolved worse due to morbid obesity this resolves her blood drawn on 2024    Orders:  -     CT abdomen pelvis w contrast; Future; Expected date: 01/10/2024    5. Benign prostatic hyperplasia with lower urinary tract symptoms, symptom details unspecified  Assessment & Plan:  Followed by urologist explained the side effect of testosterone causing BPH    Follow-up with the urology for now minimal symptoms of frequency of urination at nighttime      6. Right flank pain  Assessment & Plan:  Complains of right flank pain intermittently getting worse using Tylenol with some relief pain radiating to  right lower abdomen denies any associated nausea vomiting diarrhea    Will get CT abdomen pelvis with without contrast to make a diagnosis and if needed GI consult    Orders:  -     CT abdomen pelvis w contrast; Future; Expected date: 01/10/2024    7. Hypogonadism in male  Assessment & Plan:  omponent  Ref Range & Units 6 d ago   TESTOSTERONE TOTAL  264 - 916 ng/dL 206 Low    Comment: Adult male reference interval is based on a population of  healthy nonobese males (BMI <30) between 19 and 39 years old.  Luc et.al. JCEM 2017,102;1904-3906. PMID: 36473670.   Testosterone, Free  7.2 - 24.0 pg/mL 7.7   Above reviewed patient is on testosterone therapy prescribed by urology 1% 12.5 mg twice a day  About testosterone results reviewed discussed with the patient awaiting to be seen by urology            Discussion Summary and Plan:  Today's care plan and medications were reviewed with patient in detail and all their questions answered to their satisfaction.    Chief Complaint   Patient presents with   • Follow-up     Having pain on the right side for one month.      Subjective:  Patient came in follow-up chronic medical condition listed under visit diagnosis lost 13 pounds since the last visit trying to lose weight with diet exercise also on testosterone therapy for hypogonadism labs reviewed followed by urology awaiting to be seen by urology all the labs reviewed complaining of right flank pain off and on for last 1 month intermittently very severe somewhat so has to take Tylenol lasting about 2 hours happens about 4 days a week and also associated noticed some fullness in the right lower quadrant        The following portions of the patient's history were reviewed and updated as appropriate: allergies, current medications, past family history, past medical history, past social history, past surgical history and problem list.    Review of Systems   Constitutional:  Positive for activity change and fatigue. Negative  for appetite change, chills, diaphoresis, fever and unexpected weight change.   HENT:  Negative for congestion, dental problem, drooling, ear discharge, ear pain, facial swelling, hearing loss, mouth sores, nosebleeds, postnasal drip, rhinorrhea, sinus pressure, sneezing, sore throat, tinnitus, trouble swallowing and voice change.    Eyes:  Negative for photophobia, pain, discharge, redness, itching and visual disturbance.   Respiratory:  Negative for apnea, cough, choking, chest tightness, shortness of breath, wheezing and stridor.    Cardiovascular:  Negative for chest pain, palpitations and leg swelling.   Gastrointestinal:  Negative for abdominal distention, anal bleeding, blood in stool, constipation, diarrhea, nausea, rectal pain and vomiting.   Endocrine: Negative for cold intolerance, heat intolerance, polydipsia, polyphagia and polyuria.   Genitourinary:  Positive for flank pain. Negative for decreased urine volume, difficulty urinating, dysuria, enuresis, frequency, genital sores, hematuria and urgency.   Musculoskeletal:  Positive for arthralgias, gait problem and joint swelling. Negative for back pain, myalgias, neck pain and neck stiffness.   Skin:  Negative for color change, pallor, rash and wound.   Allergic/Immunologic: Negative.  Negative for environmental allergies, food allergies and immunocompromised state.   Neurological:  Negative for dizziness, tremors, seizures, syncope, facial asymmetry, speech difficulty, weakness, light-headedness, numbness and headaches.   Psychiatric/Behavioral:  Negative for agitation, behavioral problems, confusion, decreased concentration, dysphoric mood, hallucinations, self-injury, sleep disturbance and suicidal ideas. The patient is not nervous/anxious and is not hyperactive.          Historical Information   Patient Active Problem List   Diagnosis   • Sensorineural hearing loss (SNHL) of both ears   • Abnormal LFTs   • BPH (benign prostatic hyperplasia)   •  Psoriatic arthritis (HCC)   • GERD (gastroesophageal reflux disease)   • Mixed hyperlipidemia   • Complete tear of lateral collateral ligament of right elbow   • Post-operative state   • Sprain of right elbow   • S/P musculoskeletal system surgery   • Hypogonadism in male   • Primary hypertension   • Morbid obesity (HCC)   • Hyperprolactinemia (HCC)   • Right lower quadrant abdominal mass   • Right flank pain     Past Medical History:   Diagnosis Date   • GERD (gastroesophageal reflux disease)    • Hyperlipidemia    • Hypertension    • Psoriatic arthritis (HCC)      Past Surgical History:   Procedure Laterality Date   • COLLATERAL LIGAMENT REPAIR, ELBOW Right 7/7/2023    Procedure: LATERAL COLLATERAL LIGAMENT REPAIR WITH INTERNAL BRACE AUGMENTATION;  Surgeon: Tony Barraza;  Location:  MAIN OR;  Service: Orthopedics   • HIP SURGERY Bilateral      Social History     Substance and Sexual Activity   Alcohol Use Yes    Comment: weekly     Social History     Substance and Sexual Activity   Drug Use No     Social History     Tobacco Use   Smoking Status Never   • Passive exposure: Past   Smokeless Tobacco Never     Family History   Problem Relation Age of Onset   • No Known Problems Mother    • No Known Problems Father    • No Known Problems Sister    • No Known Problems Brother    • No Known Problems Daughter    • No Known Problems Maternal Aunt    • No Known Problems Maternal Uncle    • No Known Problems Paternal Aunt    • No Known Problems Paternal Uncle    • No Known Problems Maternal Grandmother    • No Known Problems Maternal Grandfather    • No Known Problems Paternal Grandmother    • No Known Problems Paternal Grandfather    • No Known Problems Cousin    • Alcohol abuse Neg Hx    • Arthritis Neg Hx    • Dementia Neg Hx    • Cancer Neg Hx    • COPD Neg Hx    • Depression Neg Hx    • Lupus Neg Hx    • Drug abuse Neg Hx    • Early death Neg Hx    • Hearing loss Neg Hx    • Hyperlipidemia Neg Hx    • Learning  "disabilities Neg Hx    • Substance Abuse Neg Hx    • Stroke Neg Hx    • Vision loss Neg Hx    • Pulmonary embolism Neg Hx    • Deep vein thrombosis Neg Hx    • Mental illness Neg Hx    • Crohn's disease Neg Hx    • Rheum arthritis Neg Hx    • Psoriasis Neg Hx    • Asthma Neg Hx      Health Maintenance Due   Topic   • COVID-19 Vaccine (1)   • Pneumococcal Vaccine: Pediatrics (0 to 5 Years) and At-Risk Patients (6 to 64 Years) (1 - PCV)   • HIV Screening    • Zoster Vaccine (1 of 2)   • Influenza Vaccine (1)   • Annual Physical       Meds/Allergies       Current Outpatient Medications:   •  esomeprazole (NexIUM) 40 MG capsule, Take 1 capsule (40 mg total) by mouth in the morning, Disp: 90 capsule, Rfl: 2  •  etanercept (Enbrel) 50 mg/mL SOSY, Inject 1 mL (50 mg) under the skin once a week, Disp: 4 mL, Rfl: 5  •  Evolocumab (Repatha SureClick) 140 MG/ML SOAJ, Inject 140 mg under the skin every 14 (fourteen) days, Disp: , Rfl:   •  folic acid (FOLVITE) 1 mg tablet, , Disp: , Rfl:   •  folic acid (FOLVITE) 1 mg tablet, Take 1 mg by mouth daily, Disp: , Rfl:   •  lisinopril (ZESTRIL) 5 mg tablet, Take 5 mg by mouth daily, Disp: , Rfl:   •  Testosterone 12.5 MG/ACT (1%) GEL, , Disp: , Rfl:   •  benzonatate (TESSALON) 200 MG capsule, Take 1 capsule (200 mg total) by mouth 3 (three) times a day as needed for cough (Patient not taking: Reported on 1/10/2024), Disp: 20 capsule, Rfl: 0  •  ergocalciferol (ERGOCALCIFEROL) 1.25 MG (27453 UT) capsule, Take 50,000 Units by mouth every 7 days, Disp: , Rfl:   •  fluticasone (FLONASE) 50 mcg/act nasal spray, 1 spray into each nostril 2 (two) times a day for 5 days (Patient not taking: Reported on 1/10/2024), Disp: 16 g, Rfl: 0      Objective:    Vitals:   /80   Pulse 60   Temp 98 °F (36.7 °C)   Ht 5' 9\" (1.753 m)   Wt 129 kg (283 lb 9.6 oz)   SpO2 100%   BMI 41.88 kg/m²   Body mass index is 41.88 kg/m².  Vitals:    01/10/24 1133   Weight: 129 kg (283 lb 9.6 oz) "       Physical Exam  Vitals and nursing note reviewed.   Constitutional:       General: He is not in acute distress.     Appearance: He is well-developed. He is not ill-appearing, toxic-appearing or diaphoretic.   HENT:      Head: Normocephalic and atraumatic.      Right Ear: External ear normal.      Left Ear: External ear normal.      Nose: Nose normal.      Mouth/Throat:      Pharynx: No oropharyngeal exudate.   Eyes:      General: Lids are normal. Lids are everted, no foreign bodies appreciated. No scleral icterus.        Right eye: No discharge.         Left eye: No discharge.      Conjunctiva/sclera: Conjunctivae normal.      Pupils: Pupils are equal, round, and reactive to light.   Neck:      Thyroid: No thyromegaly.      Vascular: Normal carotid pulses. No carotid bruit, hepatojugular reflux or JVD.      Trachea: No tracheal tenderness or tracheal deviation.   Cardiovascular:      Rate and Rhythm: Normal rate and regular rhythm.      Pulses: Normal pulses.      Heart sounds: Normal heart sounds. No murmur heard.     No friction rub. No gallop.   Pulmonary:      Effort: Pulmonary effort is normal. No respiratory distress.      Breath sounds: Normal breath sounds. No stridor. No wheezing or rales.   Chest:      Chest wall: No tenderness.   Abdominal:      General: Bowel sounds are normal. There is no distension.      Palpations: Abdomen is soft. There is no mass.      Tenderness: There is no abdominal tenderness. There is no guarding or rebound.      Comments: Mass soft moving slightly tender right lower quadrant abdomen   Musculoskeletal:         General: No tenderness or deformity. Normal range of motion.      Cervical back: Normal range of motion and neck supple. No edema, erythema or rigidity. No spinous process tenderness or muscular tenderness. Normal range of motion.   Lymphadenopathy:      Head:      Right side of head: No submental, submandibular, tonsillar, preauricular or posterior auricular  adenopathy.      Left side of head: No submental, submandibular, tonsillar, preauricular, posterior auricular or occipital adenopathy.      Cervical: No cervical adenopathy.      Right cervical: No superficial, deep or posterior cervical adenopathy.     Left cervical: No superficial, deep or posterior cervical adenopathy.      Upper Body:      Right upper body: No pectoral adenopathy.      Left upper body: No pectoral adenopathy.   Skin:     General: Skin is warm and dry.      Coloration: Skin is not pale.      Findings: No erythema or rash.   Neurological:      Mental Status: He is alert and oriented to person, place, and time.      Cranial Nerves: No cranial nerve deficit.      Sensory: No sensory deficit.      Motor: No tremor, abnormal muscle tone or seizure activity.      Coordination: Coordination normal.      Gait: Gait normal.      Deep Tendon Reflexes: Reflexes are normal and symmetric. Reflexes normal.   Psychiatric:         Behavior: Behavior normal.         Thought Content: Thought content normal.         Judgment: Judgment normal.         Lab Review   Orders Only on 01/04/2024   Component Date Value Ref Range Status   • White Blood Cell Count 01/04/2024 6.1  3.4 - 10.8 x10E3/uL Final   • Red Blood Cell Count 01/04/2024 5.15  4.14 - 5.80 x10E6/uL Final   • Hemoglobin 01/04/2024 15.2  13.0 - 17.7 g/dL Final   • HCT 01/04/2024 46.3  37.5 - 51.0 % Final   • MCV 01/04/2024 90  79 - 97 fL Final   • MCH 01/04/2024 29.5  26.6 - 33.0 pg Final   • MCHC 01/04/2024 32.8  31.5 - 35.7 g/dL Final   • RDW 01/04/2024 13.0  11.6 - 15.4 % Final   • Platelet Count 01/04/2024 225  150 - 450 x10E3/uL Final   • Neutrophils 01/04/2024 50  Not Estab. % Final   • Lymphocytes 01/04/2024 35  Not Estab. % Final   • Monocytes 01/04/2024 12  Not Estab. % Final   • Eosinophils 01/04/2024 3  Not Estab. % Final   • Basophils PCT 01/04/2024 0  Not Estab. % Final   • Neutrophils (Absolute) 01/04/2024 3.0  1.4 - 7.0 x10E3/uL Final   •  Lymphocytes (Absolute) 01/04/2024 2.1  0.7 - 3.1 x10E3/uL Final   • Monocytes (Absolute) 01/04/2024 0.7  0.1 - 0.9 x10E3/uL Final   • Eosinophils (Absolute) 01/04/2024 0.2  0.0 - 0.4 x10E3/uL Final   • Basophils ABS 01/04/2024 0.0  0.0 - 0.2 x10E3/uL Final   • Immature Granulocytes 01/04/2024 0  Not Estab. % Final   • Immature Granulocytes (Absolute) 01/04/2024 0.0  0.0 - 0.1 x10E3/uL Final    Comment: A hand-written panel/profile was received from your office. In  accordance with the [facility] Ambiguous Test Code Policy dated July 2003, we have assigned CBC with Differential/Platelet, Test Code  #[id number] to this request. If this is not the testing you wished to  receive on this specimen, please contact the [facility] Client Inquiry/  Technical Services Department to clarify the test order. We  appreciate your business.     • Glucose, Random 01/04/2024 85  70 - 99 mg/dL Final   • BUN 01/04/2024 11  6 - 24 mg/dL Final   • Creatinine 01/04/2024 0.97  0.76 - 1.27 mg/dL Final   • eGFR 01/04/2024 92  >59 mL/min/1.73 Final   • SL AMB BUN/CREATININE RATIO 01/04/2024 11  9 - 20 Final   • Sodium 01/04/2024 137  134 - 144 mmol/L Final   • Potassium 01/04/2024 4.4  3.5 - 5.2 mmol/L Final   • Chloride 01/04/2024 98  96 - 106 mmol/L Final   • CO2 01/04/2024 22  20 - 29 mmol/L Final   • CALCIUM 01/04/2024 9.9  8.7 - 10.2 mg/dL Final   • Protein, Total 01/04/2024 7.7  6.0 - 8.5 g/dL Final   • Albumin 01/04/2024 4.9  3.8 - 4.9 g/dL Final   • Globulin, Total 01/04/2024 2.8  1.5 - 4.5 g/dL Final   • Albumin/Globulin Ratio 01/04/2024 1.8  1.2 - 2.2 Final   • TOTAL BILIRUBIN 01/04/2024 0.7  0.0 - 1.2 mg/dL Final   • Alk Phos Isoenzymes 01/04/2024 49  44 - 121 IU/L Final   • AST 01/04/2024 44 (H)  0 - 40 IU/L Final   • ALT 01/04/2024 47 (H)  0 - 44 IU/L Final   • Specific Gravity 01/04/2024 1.024  1.005 - 1.030 Final   • Ph 01/04/2024 6.0  5.0 - 7.5 Final   • Color UA 01/04/2024 Yellow  Yellow Final   • Urine Appearance 01/04/2024 Clear   Clear Final   • Leukocyte Esterase 01/04/2024 Negative  Negative Final   • Protein 01/04/2024 Negative  Negative/Trace Final   • Glucose, 24 HR Urine 01/04/2024 Negative  Negative Final   • Ketone, Urine 01/04/2024 2+ (A)  Negative Final   • Blood, Urine 01/04/2024 Negative  Negative Final   • Bilirubin, Urine 01/04/2024 Positive (A)  Negative Final    Positive results have been confirmed.   • Urobilinogen Urine 01/04/2024 0.2  0.2 - 1.0 mg/dL Final   • Nitrites Urine 01/04/2024 Negative  Negative Final   • Microscopic Examination 01/04/2024 Comment   Final    Microscopic follows if indicated.   • Microscopic Examination 01/04/2024 See below:   Final    Microscopic was indicated and was performed.   • SL AMB WBC, URINE 01/04/2024 None seen  0 - 5 /hpf Final   • RBC, Urine 01/04/2024 0-2  0 - 2 /hpf Final   • Epithelial Cells (non renal) 01/04/2024 None seen  0 - 10 /hpf Final   • Casts 01/04/2024 None seen  None seen /lpf Final   • Bacteria, Urine 01/04/2024 None seen  None seen/Few Final   • Cholesterol, Total 01/04/2024 143  100 - 199 mg/dL Final   • Triglycerides 01/04/2024 114  0 - 149 mg/dL Final   • HDL 01/04/2024 63  >39 mg/dL Final   • LDL Calculated 01/04/2024 60  0 - 99 mg/dL Final   • Creatinine, Urine 01/04/2024 262.6  Not Estab. mg/dL Final   • Albumin,U,Random 01/04/2024 11.6  Not Estab. ug/mL Final   • Microalb/Creat Ratio 01/04/2024 4  0 - 29 mg/g creat Final    Comment:                        Normal:                0 -  29                         Moderately increased: 30 - 300                         Severely increased:       >300     • TESTOSTERONE TOTAL 01/04/2024 206 (L)  264 - 916 ng/dL Final    Comment: Adult male reference interval is based on a population of  healthy nonobese males (BMI <30) between 19 and 39 years old.  kasey Calle.al. JCEM 2017,102;7093-0674. PMID: [id number].     • Testosterone, Free 01/04/2024 7.7  7.2 - 24.0 pg/mL Final   • Hemoglobin A1C 01/04/2024 5.9 (H)  4.8 - 5.6 %  Final    Comment:          Prediabetes: 5.7 - 6.4           Diabetes: >6.4           Glycemic control for adults with diabetes: <7.0     • 25-HYDROXY VIT D 01/04/2024 36.5  30.0 - 100.0 ng/mL Final    Comment: Vitamin D deficiency has been defined by the [place] of  Medicine and an Endocrine Society practice guideline as a  level of serum 25-OH vitamin D less than 20 ng/mL (1,2).  The Endocrine Society went on to further define vitamin D  insufficiency as a level between 21 and 29 ng/mL (2).  1. IOM ([place] of [employer]). 2010. Dietary reference     intakes for calcium and D. [place]: The     National Academies Press.  2. Vishnu MF, Nitin NC, Mykel KIRAN, et al.     Evaluation, treatment, and prevention of vitamin D     deficiency: an Endocrine Society clinical practice     guideline. JCEM. 2011 Jul; 96(7):1911-30.     • Methylmalonic Acid, S 01/04/2024 119  0 - 378 nmol/L Final   • Interpretation 01/04/2024 Note   Final    Supplemental report is available.   Orders Only on 01/04/2024   Component Date Value Ref Range Status   • White Blood Cell Count 01/04/2024 5.8  3.4 - 10.8 x10E3/uL Final   • Red Blood Cell Count 01/04/2024 5.23  4.14 - 5.80 x10E6/uL Final   • Hemoglobin 01/04/2024 15.5  13.0 - 17.7 g/dL Final   • HCT 01/04/2024 46.4  37.5 - 51.0 % Final   • MCV 01/04/2024 89  79 - 97 fL Final   • MCH 01/04/2024 29.6  26.6 - 33.0 pg Final   • MCHC 01/04/2024 33.4  31.5 - 35.7 g/dL Final   • RDW 01/04/2024 13.0  11.6 - 15.4 % Final   • Platelet Count 01/04/2024 226  150 - 450 x10E3/uL Final   • Neutrophils 01/04/2024 52  Not Estab. % Final   • Lymphocytes 01/04/2024 34  Not Estab. % Final   • Monocytes 01/04/2024 10  Not Estab. % Final   • Eosinophils 01/04/2024 3  Not Estab. % Final   • Basophils PCT 01/04/2024 1  Not Estab. % Final   • Neutrophils (Absolute) 01/04/2024 3.0  1.4 - 7.0 x10E3/uL Final   • Lymphocytes (Absolute) 01/04/2024 2.0  0.7 - 3.1 x10E3/uL Final   • Monocytes (Absolute)  01/04/2024 0.6  0.1 - 0.9 x10E3/uL Final   • Eosinophils (Absolute) 01/04/2024 0.2  0.0 - 0.4 x10E3/uL Final   • Basophils ABS 01/04/2024 0.0  0.0 - 0.2 x10E3/uL Final   • Immature Granulocytes 01/04/2024 0  Not Estab. % Final   • Immature Granulocytes (Absolute) 01/04/2024 0.0  0.0 - 0.1 x10E3/uL Final    Comment: A hand-written panel/profile was received from your office. In  accordance with the [facility] Ambiguous Test Code Policy dated July 2003, we have assigned CBC with Differential/Platelet, Test Code  #[id number] to this request. If this is not the testing you wished to  receive on this specimen, please contact the [facility] Client Inquiry/  Technical Services Department to clarify the test order. We  appreciate your business.     • Glucose, Random 01/04/2024 83  70 - 99 mg/dL Final   • BUN 01/04/2024 12  6 - 24 mg/dL Final   • Creatinine 01/04/2024 0.92  0.76 - 1.27 mg/dL Final   • eGFR 01/04/2024 98  >59 mL/min/1.73 Final   • SL AMB BUN/CREATININE RATIO 01/04/2024 13  9 - 20 Final   • Sodium 01/04/2024 139  134 - 144 mmol/L Final   • Potassium 01/04/2024 4.7  3.5 - 5.2 mmol/L Final   • Chloride 01/04/2024 99  96 - 106 mmol/L Final   • CO2 01/04/2024 23  20 - 29 mmol/L Final   • CALCIUM 01/04/2024 9.9  8.7 - 10.2 mg/dL Final   • Protein, Total 01/04/2024 7.9  6.0 - 8.5 g/dL Final   • Albumin 01/04/2024 4.9  3.8 - 4.9 g/dL Final   • Globulin, Total 01/04/2024 3.0  1.5 - 4.5 g/dL Final   • Albumin/Globulin Ratio 01/04/2024 1.6  1.2 - 2.2 Final   • TOTAL BILIRUBIN 01/04/2024 0.7  0.0 - 1.2 mg/dL Final   • Alk Phos Isoenzymes 01/04/2024 45  44 - 121 IU/L Final   • AST 01/04/2024 39  0 - 40 IU/L Final   • ALT 01/04/2024 42  0 - 44 IU/L Final   • Sedimentation Rate 01/04/2024 27  0 - 30 mm/hr Final   • C-Reactive Protein, Quant 01/04/2024 5  0 - 10 mg/L Final         Patient Instructions   Hyperlipidemia   WHAT YOU NEED TO KNOW:   Hyperlipidemia is a high level of lipids (fats) in your blood. These lipids include  cholesterol or triglycerides. Lipids are made by your body. They also come from the foods you eat. Your body needs lipids to work properly, but high levels increase your risk for heart disease, heart attack, and stroke.  DISCHARGE INSTRUCTIONS:   Call your local emergency number (911 in the [place]) or have someone call if:   You have any of the following signs of a heart attack:      Squeezing, pressure, or pain in your chest    You may  also have any of the following:     Discomfort or pain in your back, neck, jaw, stomach, or arm    Shortness of breath    Nausea or vomiting    Lightheadedness or a sudden cold sweat    You have any of the following signs of a stroke:      Numbness or drooping on one side of your face     Weakness in an arm or leg    Confusion or difficulty speaking    Dizziness, a severe headache, or vision loss    Call your doctor if:   You have questions or concerns about your condition or care.      Medicines:   Medicines  may be given to reduce your cholesterol or triglyceride levels.    Take your medicine as directed.  Contact your healthcare provider if you think your medicine is not helping or if you have side effects. Tell your provider if you are allergic to any medicine. Keep a list of the medicines, vitamins, and herbs you take. Include the amounts, and when and why you take them. Bring the list or the pill bottles to follow-up visits. Carry your medicine list with you in case of an emergency.    Follow up with your doctor as directed:  You may need to return for more tests. Your healthcare provider may refer you to a dietitian or other specialist. Write down your questions so you remember to ask them during your visits.  Manage hyperlipidemia:  Your healthcare provider may first recommend that you make lifestyle changes to help decrease your lipid levels. Your provider may recommend you work with a team to manage hyperlipidemia. The team may include medical experts such as a dietitian, an  exercise or physical therapist, and a behavior therapist. Your family members may be included in helping you create lifestyle changes. You may also need to take medicine to lower your lipid levels. Some of the lifestyle changes you may need to make include the following:  Maintain a healthy weight.  Ask your healthcare provider what a healthy weight is for you. Ask your provider to help you create a weight loss plan, if needed. Weight loss can decrease your cholesterol and triglyceride levels.    Be physically active throughout the day.  Physical activity, such as exercise, lowers your cholesterol levels and helps you maintain a healthy weight. Get 30 minutes or more of aerobic exercise 4 to 6 days each week. You can split your exercise into four 10-minute workouts instead of 30 minutes at one time. Examples of aerobic exercises include walking briskly, swimming, or riding a bike. Work with your healthcare provider to plan the best exercise program for you. Also include strength training at least 2 times each week. Your healthcare providers can help you create a physical activity plan.            Do not smoke.  Nicotine and other chemicals in cigarettes and cigars can increase your risk for a heart attack and stroke. Ask your healthcare provider for information if you currently smoke and need help to quit. E-cigarettes or smokeless tobacco still contain nicotine. Talk to your healthcare provider before you use these products.    Eat heart-healthy foods.  A dietitian or your provider can give you more information on low-sodium plans or the DASH (Dietary Approaches to Stop Hypertension) eating plan. The DASH plan is low in sodium, processed sugar, unhealthy fats, and total fat. It is high in potassium, calcium, and fiber. It is high in potassium, calcium, and fiber. These can be found in vegetables, fruit, and whole-grain foods. The following are ways to get more heart-healthy foods:         Decrease the total amount  of fat you eat.  Choose lean meats, fat-free or 1% fat milk, and low-fat dairy products, such as yogurt and cheese. Limit or do not eat red meat. Red meats are high in fat and cholesterol.    Replace unhealthy fats with healthy fats.  Unhealthy fats include saturated fat, trans fat, and cholesterol. Choose soft margarines that are low in saturated fat and have little or no trans fat. Monounsaturated fats are healthy fats. These are found in olive oil, canola oil, avocado, and nuts. Polyunsaturated fats are also healthy. These are found in fish, flaxseed, walnuts, and soybeans.    Eat 5 or more servings of fruits and vegetables every day.  They are low in calories and fat and a good source of essential vitamins. Include dark green, red, and orange vegetables. Examples include spinach, kale, broccoli, and carrots.    Eat foods high in fiber.  Fiber can help lower your cholesterol levels. Choose whole grain, high-fiber foods. Good choices include whole-wheat breads or cereals, beans, peas, fruits, and vegetables.         Limit sodium (salt) as directed.  Too much sodium can affect your fluid balance and blood pressure. Your healthcare provider will tell you how much sodium and potassium are safe for you to have in a day. Your provider may recommend that you limit sodium to 2,300 mg a day. Your provider or a dietitian can help you find ways to limit sodium. For example, if you add salt while you cook, do not add more salt at the table. Check labels to find low-sodium or no-salt-added foods. Some low-sodium foods use potassium salts for flavor. Too much potassium can also cause health problems.       Ask your healthcare provider if it is okay for you to drink alcohol.  Alcohol can increase your cholesterol and triglyceride levels. Your provider can tell you how many drinks are okay to have within 24 hours and within 1 week. A drink of alcohol is 12 ounces of beer, 5 ounces of wine, or 1½ ounces of liquor.    © Copyright  "[employer] 2023 Information is for End User's use only and may not be sold, redistributed or otherwise used for commercial purposes.  The above information is an  only. It is not intended as medical advice for individual conditions or treatments. Talk to your doctor, nurse or pharmacist before following any medical regimen to see if it is safe and effective for you.       Stuart Hartley MD        \"This note has been constructed using a voice recognition system.Therefore there may be syntax, spelling, and/or grammatical errors. Please call if you have any questions. \"  "

## 2024-01-11 NOTE — TELEPHONE ENCOUNTER
Telephone call from patient trying to call Shagufta back. He states the voicemail she tried to leave cut out and he couldn't understand. He would like for Shagufta to give him a call back as soon as possible at [phone].

## 2024-01-11 NOTE — TELEPHONE ENCOUNTER
Pt called to see if Dr Hartley has reviewed the rest of his b/w results. Pt has concerns about his bilirubin and some other things and would like a call as soon as possible.

## 2024-01-15 ENCOUNTER — OFFICE VISIT (OUTPATIENT)
Dept: AUDIOLOGY | Facility: CLINIC | Age: 54
End: 2024-01-15
Payer: COMMERCIAL

## 2024-01-15 ENCOUNTER — OFFICE VISIT (OUTPATIENT)
Dept: OTOLARYNGOLOGY | Facility: CLINIC | Age: 54
End: 2024-01-15
Payer: COMMERCIAL

## 2024-01-15 VITALS — HEIGHT: 69 IN | TEMPERATURE: 97.3 F | WEIGHT: 283 LBS | BODY MASS INDEX: 41.92 KG/M2

## 2024-01-15 DIAGNOSIS — H90.3 SENSORINEURAL HEARING LOSS (SNHL) OF BOTH EARS: Primary | ICD-10-CM

## 2024-01-15 DIAGNOSIS — H90.3 SENSORINEURAL HEARING LOSS (SNHL), BILATERAL: Primary | ICD-10-CM

## 2024-01-15 PROCEDURE — 99213 OFFICE O/P EST LOW 20 MIN: CPT | Performed by: STUDENT IN AN ORGANIZED HEALTH CARE EDUCATION/TRAINING PROGRAM

## 2024-01-15 PROCEDURE — 92567 TYMPANOMETRY: CPT | Performed by: AUDIOLOGIST

## 2024-01-15 PROCEDURE — 92557 COMPREHENSIVE HEARING TEST: CPT | Performed by: AUDIOLOGIST

## 2024-01-15 NOTE — PROGRESS NOTES
ADULT ENT HEARING EVALUATION - [facility] AUDIOLOGY      Patient Name: Christopher Villela   MRN:  [id number]   :  1970   Age: 53 y.o.   Gender: male   DOS: 1/15/2024     HISTORY:     Christopher Villela, a 53 y.o. male, was seen on 1/15/2024 at the referral of ANNA MARIE Sterling,  for an evaluation of hearing as part of his ENT visit. Mr. Villela's primary complaint is bilateral hearing loss. He denied otalgia, otorrhea, aural fullness, tinnitus, and dizziness. Mr. Villela has had his hearing tested previously on 3/22/23, which demonstrated HFSNHL.    RESULTS:    Otoscopic Evaluation:   Right Ear: Unremarkable, canal clear   Left Ear: Unremarkable, canal clear    Tympanometry:   Right Ear: Type A; normal middle ear pressure and static compliance    Left Ear: Type A; normal middle ear pressure and static compliance     Audiometry:  Conventional pure tone audiometry from 250 - 8000 Hz  obtained with good reliability and revealed the following:     Right Ear: normal to severe sensorineural hearing loss (SNHL)   Left Ear: normal to severe sensorineural hearing loss (SNHL)     Speech Audiometry:    Speech Reception (SRT)   Right Ear: 10 dB HL   Left Ear: 20 dB HL    Word Recognition Scores (WRS):  Right Ear: excellent (90 % correct)     Left Ear: excellent (100 % correct)   Stimuli: [id number]    *see attached audiogram*      RECOMMENDATIONS:    1.) Follow-up with referring provider.  2.) Based on today's findings and patient symptoms, Mr. Villela is a candidate for a trial with amplification. A hearing aid evaluation to further discuss Mr. Villela's lifestyle, communication needs, and develop a treatment plan for their hearing loss is recommended. Mr. Villela is interested in learning more. He will call for scheduling.         It was a pleasure working with Mr. Villela today. Thank you for referring this patient.     Jon Murphy.  Clinical Audiologist    [facility] PROFESSIONAL [facility] AUDIOLOGY  [address]  [place] [id number]

## 2024-01-15 NOTE — PROGRESS NOTES
"Otolaryngology-- Head and Neck Surgery Follow up visit      Follow up:    3/2/23:  Shamika  Presents today as a new patient due to ear concerns.  Hearing gradually worsening. Difficulty with high pitched sounds.  Bilateral ears feel blocked.  Ringing tinnitus.  No otalgia or otorrhea.  No history of ear surgery.  No current hearing aids.  Moderate noise exposure over the years.   Sensorineural hearing loss (SNHL) of both ears  On exam, minimal cerumen on the right removed with suction, one loose hair removed on left with alligator forceps.  No procedure  Audiogram completed today indicated bilateral high frequency SNHL.  Tymps Type A bilaterall    1/15/24:  Today for annual hearing check.  Hearing tests performed today and showed:  Tympanogram bilateral type A  Audiogram severe HFSNHL, bilateal      Review of any relevant imaging:      Interval Review of systems: Pertinent review of systems documented in the HPI.    Interval Social History:  Social History     Socioeconomic History    Marital status: /Civil Union     Spouse name: Not on file    Number of children: Not on file    Years of education: Not on file    Highest education level: Not on file   Occupational History    Not on file   Tobacco Use    Smoking status: Never     Passive exposure: Past    Smokeless tobacco: Never   Vaping Use    Vaping status: Never Used   Substance and Sexual Activity    Alcohol use: Yes     Comment: weekly    Drug use: No    Sexual activity: Not on file   Other Topics Concern    Not on file   Social History Narrative    Not on file     Social Determinants of Health     Financial Resource Strain: Not on file   Food Insecurity: Not on file   Transportation Needs: Not on file   Physical Activity: Not on file   Stress: Not on file   Social Connections: Not on file   Intimate Partner Violence: Not on file   Housing Stability: Not on file       Interval Physical Examination:  Temp (!) 97.3 °F (36.3 °C) (Temporal)   Ht 5' 9\" (1.753 " m)   Wt 128 kg (283 lb)   BMI 41.79 kg/m²     Head: Atraumatic, no visible scalp lesions, parotid and submandibular salivary glands non-tender to palpation and without masses bilaterally.   Neck:  No visible or palpable cervical lesions or lymphadenopathy, thyroid gland is normal in size and symmetry and without masses, normal laryngeal elevation with swallowing.  Ears:    Right ear :  Auricles normal in appearance, mastoid prominence non-tender, external auditory canal clear. Tympanic membrane intact.   Left ear :  Auricles normal in appearance, mastoid prominence non-tender, external auditory canal clear. Tympanic membrane intact.   Nose/Sinuses:  External appearance unremarkable, no maxillary or frontal sinus tenderness to palpation bilaterally. Anterior rhinoscopy reveals:  Oral Cavity:  Moist mucus membranes, gums and dentition unremarkable, no oral mucosal masses or lesions, floor of mouth soft, tongue mobile without masses or lesions.   Oropharynx:  Base of tongue soft and without masses, tonsils bilaterally unremarkable, soft palate mucosa unremarkable.       Abdomen: Soft and lax  Extremities: No bruises   Eyes:  Extra-ocular movements intact, pupils equally round and reactive to light and accommodation, the lids and conjunctivae are normal in appearance.  Constitutional:  Well developed, well nourished and groomed, in no acute distress.   Cardiovascular:  Normal rate and rhythm, no palpable thrills, no jugulovenous distension observed.  Respiratory:  Normal respiratory effort without evidence of retractions or use of accessory muscles.  Neurologic:  Cranial nerves II-XII intact bilaterally.  Psychiatric:  Alert and oriented to time, place and person.      Procedure:      Assessment:  1. Sensorineural hearing loss (SNHL) of both ears            Plan:    Sensorineural hearing loss  Audiogram today indicated normal sloping to severe SNHL bilateral. Discussed the nature of maturity related hearing loss with  tinnitus in detail. Reviewed treatment options including acceptance, moving closer to speaker, and hearing amplification.  Recommend would be a good candidate for hearing amplification.  Discussed hearing aid options including costs, risks, and benefits.

## 2024-01-18 ENCOUNTER — HOSPITAL ENCOUNTER (OUTPATIENT)
Dept: RADIOLOGY | Facility: HOSPITAL | Age: 54
Discharge: HOME | End: 2024-01-18
Attending: INTERNAL MEDICINE
Payer: COMMERCIAL

## 2024-01-18 DIAGNOSIS — R10.9 RIGHT FLANK PAIN: ICD-10-CM

## 2024-01-18 DIAGNOSIS — R79.89 ABNORMAL LFTS: ICD-10-CM

## 2024-01-18 DIAGNOSIS — R19.03 RIGHT LOWER QUADRANT ABDOMINAL MASS: ICD-10-CM

## 2024-01-18 PROCEDURE — 74177 CT ABD & PELVIS W/CONTRAST: CPT

## 2024-01-18 PROCEDURE — G1004 CDSM NDSC: HCPCS

## 2024-01-18 RX ADMIN — IOHEXOL 100 ML: 350 INJECTION, SOLUTION INTRAVENOUS at 10:07

## 2024-02-07 ENCOUNTER — TELEPHONE (OUTPATIENT)
Age: 54
End: 2024-02-07

## 2024-02-07 ENCOUNTER — APPOINTMENT (OUTPATIENT)
Dept: RADIOLOGY | Facility: CLINIC | Age: 54
End: 2024-02-07
Payer: COMMERCIAL

## 2024-02-07 ENCOUNTER — OFFICE VISIT (OUTPATIENT)
Dept: OBGYN CLINIC | Facility: CLINIC | Age: 54
End: 2024-02-07
Payer: COMMERCIAL

## 2024-02-07 VITALS
HEIGHT: 69 IN | DIASTOLIC BLOOD PRESSURE: 92 MMHG | BODY MASS INDEX: 41.92 KG/M2 | HEART RATE: 90 BPM | SYSTOLIC BLOOD PRESSURE: 152 MMHG | WEIGHT: 283 LBS

## 2024-02-07 DIAGNOSIS — M25.511 RIGHT SHOULDER PAIN, UNSPECIFIED CHRONICITY: ICD-10-CM

## 2024-02-07 DIAGNOSIS — M25.512 LEFT SHOULDER PAIN, UNSPECIFIED CHRONICITY: ICD-10-CM

## 2024-02-07 DIAGNOSIS — M75.42 IMPINGEMENT SYNDROME OF LEFT SHOULDER REGION: Primary | ICD-10-CM

## 2024-02-07 DIAGNOSIS — M75.41 IMPINGEMENT SYNDROME OF RIGHT SHOULDER REGION: ICD-10-CM

## 2024-02-07 DIAGNOSIS — K40.90 NON-RECURRENT UNILATERAL INGUINAL HERNIA WITHOUT OBSTRUCTION OR GANGRENE: Primary | ICD-10-CM

## 2024-02-07 PROCEDURE — 20610 DRAIN/INJ JOINT/BURSA W/O US: CPT | Performed by: ORTHOPAEDIC SURGERY

## 2024-02-07 PROCEDURE — 99214 OFFICE O/P EST MOD 30 MIN: CPT | Performed by: ORTHOPAEDIC SURGERY

## 2024-02-07 PROCEDURE — 73030 X-RAY EXAM OF SHOULDER: CPT

## 2024-02-07 RX ORDER — LIDOCAINE HYDROCHLORIDE 10 MG/ML
4 INJECTION, SOLUTION INFILTRATION; PERINEURAL
Status: COMPLETED | OUTPATIENT
Start: 2024-02-07 | End: 2024-02-07

## 2024-02-07 RX ORDER — DEXAMETHASONE SODIUM PHOSPHATE 10 MG/ML
40 INJECTION, SOLUTION INTRAMUSCULAR; INTRAVENOUS
Status: COMPLETED | OUTPATIENT
Start: 2024-02-07 | End: 2024-02-07

## 2024-02-07 RX ADMIN — LIDOCAINE HYDROCHLORIDE 4 ML: 10 INJECTION, SOLUTION INFILTRATION; PERINEURAL at 09:00

## 2024-02-07 RX ADMIN — DEXAMETHASONE SODIUM PHOSPHATE 40 MG: 10 INJECTION, SOLUTION INTRAMUSCULAR; INTRAVENOUS at 09:00

## 2024-02-07 NOTE — TELEPHONE ENCOUNTER
Patient called in to follow up on CT abdomen results from 1/18/24. Requesting PCP please follow up today.

## 2024-02-07 NOTE — PROGRESS NOTES
"Assessment/Plan:  1. Impingement syndrome of left shoulder region  XR shoulder 2+ vw left    Large joint arthrocentesis: L subacromial bursa      2. Impingement syndrome of right shoulder region  XR shoulder 2+ vw right    Large joint arthrocentesis: R subacromial bursa        Scribe Attestation      I,:  Hector Mendez am acting as a scribe while in the presence of the attending physician.:       I,:  Isaias Gresham, DO personally performed the services described in this documentation    as scribed in my presence.:               Kuldeep is presenting with bilateral shoulder impingement.  The right is more symptomatic than the left though he clearly impinges on the left as well.  We discussed treatment options moving forward such as a physician directed home exercise program versus formal physical therapy.  He can benefit from steroid injection to decrease the inflamed bursa.  Kuldeep prefers to have a steroid injection for both the right and left shoulders today.  We discussed the risks and benefits of receiving a steroid injection.  He provided verbal consent to proceed.  Postinjection instructions were provided.  I do recommend that he limit his activity for the remainder of today to give the medication time to take effect.  I asked that he schedule an appointment with me for next week to discuss his knees.  Since he has found relief from Euflexxa injections in the past it is likely we will follow a similar treatment plan.  First I would like to have x-rays to document and assess the severity of his arthritis.    Large joint arthrocentesis: R subacromial bursa  Universal Protocol:  Consent: Verbal consent obtained.  Risks and benefits: risks, benefits and alternatives were discussed  Consent given by: patient  Time out: Immediately prior to procedure a \"time out\" was called to verify the correct patient, procedure, equipment, support staff and site/side marked as required.  Patient understanding: patient states " "understanding of the procedure being performed  Patient consent: the patient's understanding of the procedure matches consent given  Patient identity confirmed: verbally with patient  Supporting Documentation  Indications: pain   Procedure Details  Location: shoulder - R subacromial bursa  Needle size: 22 G  Ultrasound guidance: no  Approach: posterior  Medications administered: 4 mL lidocaine 1 %; 40 mg dexamethasone 100 mg/10 mL    Patient tolerance: patient tolerated the procedure well with no immediate complications  Dressing:  Sterile dressing applied      Large joint arthrocentesis: L subacromial bursa  Universal Protocol:  Consent: Verbal consent obtained.  Risks and benefits: risks, benefits and alternatives were discussed  Consent given by: patient  Time out: Immediately prior to procedure a \"time out\" was called to verify the correct patient, procedure, equipment, support staff and site/side marked as required.  Patient understanding: patient states understanding of the procedure being performed  Patient consent: the patient's understanding of the procedure matches consent given  Patient identity confirmed: verbally with patient  Supporting Documentation  Indications: pain   Procedure Details  Location: shoulder - L subacromial bursa  Preparation: Patient was prepped and draped in the usual sterile fashion  Needle size: 22 G  Ultrasound guidance: no  Approach: posterior  Medications administered: 4 mL lidocaine 1 %; 40 mg dexamethasone 100 mg/10 mL    Patient tolerance: patient tolerated the procedure well with no immediate complications  Dressing:  Sterile dressing applied          Subjective:   Christopher Villela is a 53 y.o. male who presents to the office today for evaluation of bilateral shoulder pain, the right more so than the left.  He denies recent injury though admits to a gradual onset of a severe ache about the lateral aspect of the right shoulder that worsens with active shoulder abduction and " reaching behind his back.  Overhead activities can be painful as well.  He states his right shoulder pain will wake him at night.  He is better with activity modification.  He states he heats his home with a wood-burning stove.  Managing firewood has become quite painful for him.  He states the left shoulder is similar but not as severe and more moderate.  That pain is described as an ache as well and located at the lateral aspect of the left shoulder.  This pain is nonradiating.  Overhead activities and reaching behind his back also exacerbate his shoulder pain.  He denies distal paresthesias for both upper extremities.  He admits to a history of psoriatic osteoarthritis.  He has had bilateral hip replacement and a known history of bilateral knee osteoarthritis.  He states he has received Euflexxa injections in the past for knee osteoarthritis which did provide relief.  He would like to open a discussion on future treatment for his knees.      Review of Systems   Constitutional:  Negative for chills, fever and unexpected weight change.   HENT:  Negative for hearing loss, nosebleeds and sore throat.    Eyes:  Negative for pain, redness and visual disturbance.   Respiratory:  Negative for cough, shortness of breath and wheezing.    Cardiovascular:  Negative for chest pain, palpitations and leg swelling.   Gastrointestinal:  Negative for abdominal pain, nausea and vomiting.   Endocrine: Negative for polyphagia and polyuria.   Genitourinary:  Negative for dysuria and hematuria.   Musculoskeletal:         See HPI   Skin:  Negative for rash and wound.   Neurological:  Negative for dizziness, numbness and headaches.   Psychiatric/Behavioral:  Negative for decreased concentration and suicidal ideas. The patient is not nervous/anxious.          Past Medical History:   Diagnosis Date    GERD (gastroesophageal reflux disease)     Hyperlipidemia     Hypertension     Psoriatic arthritis (HCC)        Past Surgical History:    Procedure Laterality Date    COLLATERAL LIGAMENT REPAIR, ELBOW Right 7/7/2023    Procedure: LATERAL COLLATERAL LIGAMENT REPAIR WITH INTERNAL BRACE AUGMENTATION;  Surgeon: Tony Barraza;  Location:  MAIN OR;  Service: Orthopedics    HIP SURGERY Bilateral        Family History   Problem Relation Age of Onset    No Known Problems Mother     No Known Problems Father     No Known Problems Sister     No Known Problems Brother     No Known Problems Daughter     No Known Problems Maternal Aunt     No Known Problems Maternal Uncle     No Known Problems Paternal Aunt     No Known Problems Paternal Uncle     No Known Problems Maternal Grandmother     No Known Problems Maternal Grandfather     No Known Problems Paternal Grandmother     No Known Problems Paternal Grandfather     No Known Problems Cousin     Alcohol abuse Neg Hx     Arthritis Neg Hx     Dementia Neg Hx     Cancer Neg Hx     COPD Neg Hx     Depression Neg Hx     Lupus Neg Hx     Drug abuse Neg Hx     Early death Neg Hx     Hearing loss Neg Hx     Hyperlipidemia Neg Hx     Learning disabilities Neg Hx     Substance Abuse Neg Hx     Stroke Neg Hx     Vision loss Neg Hx     Pulmonary embolism Neg Hx     Deep vein thrombosis Neg Hx     Mental illness Neg Hx     Crohn's disease Neg Hx     Rheum arthritis Neg Hx     Psoriasis Neg Hx     Asthma Neg Hx        Social History     Occupational History    Not on file   Tobacco Use    Smoking status: Never     Passive exposure: Past    Smokeless tobacco: Never   Vaping Use    Vaping status: Never Used   Substance and Sexual Activity    Alcohol use: Yes     Comment: weekly    Drug use: No    Sexual activity: Not on file         Current Outpatient Medications:     ergocalciferol (ERGOCALCIFEROL) 1.25 MG (67814 UT) capsule, Take 50,000 Units by mouth every 7 days, Disp: , Rfl:     esomeprazole (NexIUM) 40 MG capsule, Take 1 capsule (40 mg total) by mouth in the morning, Disp: 90 capsule, Rfl: 2    etanercept (Enbrel) 50 mg/mL  SOSY, Inject 1 mL (50 mg) under the skin once a week, Disp: 4 mL, Rfl: 5    Evolocumab (Repatha SureClick) 140 MG/ML SOAJ, Inject 140 mg under the skin every 14 (fourteen) days, Disp: , Rfl:     fluticasone (FLONASE) 50 mcg/act nasal spray, 1 spray into each nostril 2 (two) times a day for 5 days (Patient not taking: Reported on 1/10/2024), Disp: 16 g, Rfl: 0    folic acid (FOLVITE) 1 mg tablet, , Disp: , Rfl:     folic acid (FOLVITE) 1 mg tablet, Take 1 mg by mouth daily, Disp: , Rfl:     lisinopril (ZESTRIL) 5 mg tablet, Take 5 mg by mouth daily, Disp: , Rfl:     Testosterone 12.5 MG/ACT (1%) GEL, , Disp: , Rfl:     No Known Allergies    Objective:  Vitals:    02/07/24 0901   BP: 152/92   Pulse: 90       Right Shoulder Exam     Range of Motion   Active abduction:  160   External rotation:  90   Forward flexion:  170   Internal rotation 0 degrees:  L2     Muscle Strength   The patient has normal right shoulder strength.  Abduction: 5/5   Internal rotation: 5/5   External rotation: 5/5   Supraspinatus: 5/5   Subscapularis: 5/5   Biceps: 5/5     Tests   Impingement: positive    Other   Erythema: absent  Sensation: normal  Pulse: present (2+ radial)      Left Shoulder Exam     Range of Motion   Active abduction:  160   External rotation:  90   Forward flexion:  170   Internal rotation 0 degrees:  T12     Muscle Strength   Abduction: 5/5   Internal rotation: 5/5   External rotation: 5/5   Supraspinatus: 5/5   Subscapularis: 5/5   Biceps: 5/5     Tests   Impingement: positive    Other   Erythema: absent  Sensation: normal  Pulse: present (2+ radial)             Physical Exam  Vitals reviewed.   Constitutional:       Appearance: He is well-developed.   HENT:      Head: Normocephalic and atraumatic.   Eyes:      General:         Right eye: No discharge.         Left eye: No discharge.      Conjunctiva/sclera: Conjunctivae normal.   Cardiovascular:      Rate and Rhythm: Regular rhythm.   Pulmonary:      Effort: Pulmonary  effort is normal. No respiratory distress.   Musculoskeletal:      Cervical back: Normal range of motion and neck supple.   Skin:     General: Skin is warm and dry.   Neurological:      Mental Status: He is alert and oriented to person, place, and time.   Psychiatric:         Behavior: Behavior normal.         I have personally reviewed pertinent films in PACS and my interpretation is as follows:  X-rays of the right shoulder taken today show mild acromioclavicular osteoarthritis.  There is no evidence of acute fracture or other osseous abnormality.  X-rays of the left shoulder today demonstrate calcified bodies located at the inferior glenoid that is likely an incidental finding.  The glenohumeral and acromioclavicular joints are normal with no evidence of significant degeneration.  There is no evidence of acute fracture or other osseous abnormality.      This document was created using speech voice recognition software.   Grammatical errors, random word insertions, pronoun errors, and incomplete sentences are an occasional consequence of this system due to software limitations, ambient noise, and hardware issues.   Any formal questions or concerns about content, text, or information contained within the body of this dictation should be directly addressed to the provider for clarification.

## 2024-02-14 ENCOUNTER — APPOINTMENT (OUTPATIENT)
Dept: RADIOLOGY | Facility: CLINIC | Age: 54
End: 2024-02-14
Payer: COMMERCIAL

## 2024-02-14 ENCOUNTER — OFFICE VISIT (OUTPATIENT)
Dept: OBGYN CLINIC | Facility: CLINIC | Age: 54
End: 2024-02-14
Payer: COMMERCIAL

## 2024-02-14 VITALS
HEIGHT: 69 IN | BODY MASS INDEX: 41.92 KG/M2 | WEIGHT: 283 LBS | SYSTOLIC BLOOD PRESSURE: 146 MMHG | HEART RATE: 90 BPM | DIASTOLIC BLOOD PRESSURE: 92 MMHG

## 2024-02-14 DIAGNOSIS — M25.562 LEFT KNEE PAIN, UNSPECIFIED CHRONICITY: ICD-10-CM

## 2024-02-14 DIAGNOSIS — M17.12 PRIMARY LOCALIZED OSTEOARTHRITIS OF LEFT KNEE: Primary | ICD-10-CM

## 2024-02-14 DIAGNOSIS — M25.561 RIGHT KNEE PAIN, UNSPECIFIED CHRONICITY: ICD-10-CM

## 2024-02-14 DIAGNOSIS — M17.11 PRIMARY LOCALIZED OSTEOARTHRITIS OF RIGHT KNEE: ICD-10-CM

## 2024-02-14 PROCEDURE — 73562 X-RAY EXAM OF KNEE 3: CPT

## 2024-02-14 PROCEDURE — 99214 OFFICE O/P EST MOD 30 MIN: CPT | Performed by: ORTHOPAEDIC SURGERY

## 2024-02-14 NOTE — PROGRESS NOTES
Assessment/Plan:  1. Primary localized osteoarthritis of left knee  XR knee 3 vw left non injury      2. Primary localized osteoarthritis of right knee  XR knee 3 vw right non injury          Lm has bilateral knee pain consistent with severe medial compartment osteoarthritis.  He appears to have done very well with viscosupplement injections in the past and I would recommend continued another round of viscosupplement injections as these are most appropriate and beneficial for him at this time.  We discussed the benefits of weight loss as well.  He agreed to proceed with viscosupplement injections bilaterally and they were ordered today.  We will schedule him for these injections once they arrive at our office.    Subjective:   Christopher Villela is a 53 y.o. male who presents to the office for evaluation for bilateral knee pain.  He has a history of osteoarthritis in both knees and was previously under the care of Dr. Quiñonez in [facility].  He last completed a series of viscosupplement injections in November 2021.  He states he had been doing very well since those injections.  He had some mild discomfort following the injection followed by significant relief for several years.  He states in the last 6 months he has had increasing discomfort over the medial aspect of both knees.  It bothers him going up and down stairs he feels a general achiness within the knees with activity.  He denies any recent injury or trauma.  He denies any significant swelling.      Review of Systems   Constitutional:  Negative for chills, fever and unexpected weight change.   HENT:  Negative for hearing loss, nosebleeds and sore throat.    Eyes:  Negative for pain, redness and visual disturbance.   Respiratory:  Negative for cough, shortness of breath and wheezing.    Cardiovascular:  Negative for chest pain, palpitations and leg swelling.   Gastrointestinal:  Negative for abdominal pain, nausea and vomiting.   Endocrine: Negative for  polyphagia and polyuria.   Genitourinary:  Negative for dysuria and hematuria.   Musculoskeletal:         See HPI   Skin:  Negative for rash and wound.   Neurological:  Negative for dizziness, numbness and headaches.   Psychiatric/Behavioral:  Negative for decreased concentration and suicidal ideas. The patient is not nervous/anxious.          Past Medical History:   Diagnosis Date    GERD (gastroesophageal reflux disease)     Hyperlipidemia     Hypertension     Psoriatic arthritis (HCC)        Past Surgical History:   Procedure Laterality Date    COLLATERAL LIGAMENT REPAIR, ELBOW Right 7/7/2023    Procedure: LATERAL COLLATERAL LIGAMENT REPAIR WITH INTERNAL BRACE AUGMENTATION;  Surgeon: Tony Barraza;  Location:  MAIN OR;  Service: Orthopedics    HIP SURGERY Bilateral        Family History   Problem Relation Age of Onset    No Known Problems Mother     No Known Problems Father     No Known Problems Sister     No Known Problems Brother     No Known Problems Daughter     No Known Problems Maternal Aunt     No Known Problems Maternal Uncle     No Known Problems Paternal Aunt     No Known Problems Paternal Uncle     No Known Problems Maternal Grandmother     No Known Problems Maternal Grandfather     No Known Problems Paternal Grandmother     No Known Problems Paternal Grandfather     No Known Problems Cousin     Alcohol abuse Neg Hx     Arthritis Neg Hx     Dementia Neg Hx     Cancer Neg Hx     COPD Neg Hx     Depression Neg Hx     Lupus Neg Hx     Drug abuse Neg Hx     Early death Neg Hx     Hearing loss Neg Hx     Hyperlipidemia Neg Hx     Learning disabilities Neg Hx     Substance Abuse Neg Hx     Stroke Neg Hx     Vision loss Neg Hx     Pulmonary embolism Neg Hx     Deep vein thrombosis Neg Hx     Mental illness Neg Hx     Crohn's disease Neg Hx     Rheum arthritis Neg Hx     Psoriasis Neg Hx     Asthma Neg Hx        Social History     Occupational History    Not on file   Tobacco Use    Smoking status: Never      Passive exposure: Past    Smokeless tobacco: Never   Vaping Use    Vaping status: Never Used   Substance and Sexual Activity    Alcohol use: Yes     Comment: weekly    Drug use: No    Sexual activity: Not on file         Current Outpatient Medications:     ergocalciferol (ERGOCALCIFEROL) 1.25 MG (79847 UT) capsule, Take 50,000 Units by mouth every 7 days, Disp: , Rfl:     esomeprazole (NexIUM) 40 MG capsule, Take 1 capsule (40 mg total) by mouth in the morning, Disp: 90 capsule, Rfl: 2    etanercept (Enbrel) 50 mg/mL SOSY, Inject 1 mL (50 mg) under the skin once a week, Disp: 4 mL, Rfl: 5    Evolocumab (Repatha SureClick) 140 MG/ML SOAJ, Inject 140 mg under the skin every 14 (fourteen) days, Disp: , Rfl:     fluticasone (FLONASE) 50 mcg/act nasal spray, 1 spray into each nostril 2 (two) times a day for 5 days (Patient not taking: Reported on 1/10/2024), Disp: 16 g, Rfl: 0    folic acid (FOLVITE) 1 mg tablet, , Disp: , Rfl:     folic acid (FOLVITE) 1 mg tablet, Take 1 mg by mouth daily, Disp: , Rfl:     lisinopril (ZESTRIL) 5 mg tablet, Take 5 mg by mouth daily, Disp: , Rfl:     Testosterone 12.5 MG/ACT (1%) GEL, , Disp: , Rfl:     No Known Allergies    Objective:  Vitals:    02/14/24 0809   BP: 146/92   Pulse: 90     Pain Score:   4      Right Knee Exam     Tenderness   The patient is experiencing tenderness in the medial joint line.    Range of Motion   Extension:  normal   Flexion:  normal     Other   Erythema: absent  Sensation: normal  Pulse: present  Swelling: mild  Effusion: no effusion present      Left Knee Exam     Tenderness   The patient is experiencing tenderness in the medial joint line.    Range of Motion   Extension:  normal   Flexion:  normal     Other   Erythema: absent  Sensation: normal  Pulse: present  Swelling: mild  Effusion: no effusion present          Observations   Left Knee   Negative for effusion.     Right Knee   Negative for effusion.       Physical Exam  Vitals reviewed.    Constitutional:       Appearance: He is well-developed.   HENT:      Head: Normocephalic and atraumatic.   Eyes:      Conjunctiva/sclera: Conjunctivae normal.      Pupils: Pupils are equal, round, and reactive to light.   Cardiovascular:      Rate and Rhythm: Normal rate.      Pulses: Normal pulses.   Pulmonary:      Effort: Pulmonary effort is normal. No respiratory distress.   Musculoskeletal:      Cervical back: Normal range of motion and neck supple.      Right knee: No effusion.      Left knee: No effusion.      Comments: As noted in HPI   Skin:     General: Skin is warm and dry.   Neurological:      General: No focal deficit present.      Mental Status: He is alert and oriented to person, place, and time.   Psychiatric:         Mood and Affect: Mood normal.         Behavior: Behavior normal.         I have personally reviewed pertinent films in PACS and my interpretation is as follows:  Bilateral knee x-rays in the office today demonstrate severe medial compartment osteoarthritis and moderate patellofemoral arthritis.  Mild knee effusion bilaterally      This document was created using speech voice recognition software.   Grammatical errors, random word insertions, pronoun errors, and incomplete sentences are an occasional consequence of this system due to software limitations, ambient noise, and hardware issues.   Any formal questions or concerns about content, text, or information contained within the body of this dictation should be directly addressed to the provider for clarification.

## 2024-02-15 ENCOUNTER — OFFICE VISIT (OUTPATIENT)
Dept: AUDIOLOGY | Facility: CLINIC | Age: 54
End: 2024-02-15

## 2024-02-15 DIAGNOSIS — H90.3 SENSORINEURAL HEARING LOSS (SNHL), BILATERAL: Primary | ICD-10-CM

## 2024-02-15 NOTE — PROGRESS NOTES
Hearing Aid Evaluation  Name:  Christopher Villela  :  1970  Age:  53 y.o.  MRN:  [id number]  Date of Evaluation: 02/15/24     HISTORY:    Christopher Villela was seen today for a hearing aid evaluation following his audiometric testing. Christopher was unaccompanied to today's visit. Christopher was referred by Dr. Hartley.     RESULTS REVIEW:    The audiometric findings were reviewed with the patient . All of the patient's questions regarding his hearing status were addressed, and the importance of realistic expectations of hearing loss and amplification were discussed.      DEVICE REVIEW & RECOMMENDATION:     Strengths and limitations of amplification, including various hearing aid styles, technology, options, and accessories were discussed at length with patient. Hearing aids are assistive devices and are not designed to restore normal hearing. The patient was counseled on the importance of self-advocacy, motivation, as well as effective communication strategies that can be used to optimize hearing aid success. Based on the degree of his hearing loss, preferences, and lifestyle needs, the following hearing recommendations were made:    The first hearing aid recommendation is Gera Doreen ITCs.  The second hearing aid recommendation is [employer]on[employer] ZARATE RICs.    [facility] office policies regarding our hearing aid program were reviewed, including the 45 day trial period, non-refundable return fees, as well as the  warranties and service plan. Hearing aid cost, and payment, as well as insurance benefit (if applicable) were discussed with the patient.     *See attached quote sheet    DEVICE SELECTION:    At this time, patient wishes to defer the purchase of hearing aids.  Cost is his largest barrier. He will pursue coverage through [employer]. Referral paperwork given. 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

## 2024-02-26 ENCOUNTER — CONSULT (OUTPATIENT)
Dept: SURGERY | Facility: CLINIC | Age: 54
End: 2024-02-26
Payer: COMMERCIAL

## 2024-02-26 VITALS
OXYGEN SATURATION: 97 % | TEMPERATURE: 97 F | HEART RATE: 82 BPM | WEIGHT: 292 LBS | BODY MASS INDEX: 43.25 KG/M2 | SYSTOLIC BLOOD PRESSURE: 162 MMHG | DIASTOLIC BLOOD PRESSURE: 96 MMHG | HEIGHT: 69 IN

## 2024-02-26 DIAGNOSIS — K40.90 NON-RECURRENT UNILATERAL INGUINAL HERNIA WITHOUT OBSTRUCTION OR GANGRENE: ICD-10-CM

## 2024-02-26 PROCEDURE — 99203 OFFICE O/P NEW LOW 30 MIN: CPT | Performed by: SURGERY

## 2024-02-26 NOTE — PROGRESS NOTES
"[facility] History and Physical Note:    Assessment:  Asymptomatic right inguinal hernia and a small asymptomatic umbilical hernia.    Pertinent labs reviewed  Reviewed the CMP, CBC, and A1c (5.9) from 4 January 2024  Pertinent images and available reads personally reviewed  Reviewed the CT images and CT report from 18 January 2024  Pertinent notes reviewed  Reviewed the last PCP note by Dr. Hartley    Plan:  Discussed that patient has a large right inguinal hernia and small umbilical hernia.   We discussed that patient is currently asymptomatic from the right inguinal hernia and we recommend repair when it fits his schedule. Explained the R inguinal hernia will increase in size over time, and not improve. We discussed alarm symptoms such as groin swelling, bowel obstruction, groin erythema, scrotal swelling, if he experiences any of these he should report back to the surgery office.  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  Follow-up in November 2024 to schedule right inguinal hernia repair.        Chief Complaint:  \"I am here for the right inguinal hernia\"    HPI  Patient is a 53-year-old gentleman with morbid obesity (BMI 43) and hypertension referred to my office for a right inguinal hernia.  Patient is otherwise healthy.  Does not smoke.  Understands that he is overweight with a BMI of 43, and we encouraged exercise and weight loss.  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  Discussed the physiology and pathology of inguinal hernia, that this would not improve on its own and will only worsen with time.  Explained alarm symptoms, that he should report back with alarm symptoms for more urgent hernia repair.  Denies any other prior abdominal surgeries.  Denied history of heart attack or stroke.  Does not " take anticoagulation or antiplatelet therapy.    CT abdomen pelvis performed on 18 January 2024:  IMPRESSION:  1. No acute findings in the abdomen/pelvis.  2. Small fat-containing umbilical hernia and small fat-containing right inguinal hernia.    PMH:  Past Medical History:   Diagnosis Date   • GERD (gastroesophageal reflux disease)    • Hyperlipidemia    • Hypertension    • Psoriatic arthritis (HCC)        PSH:  Past Surgical History:   Procedure Laterality Date   • COLLATERAL LIGAMENT REPAIR, ELBOW Right 7/7/2023    Procedure: LATERAL COLLATERAL LIGAMENT REPAIR WITH INTERNAL BRACE AUGMENTATION;  Surgeon: Tony Barraza;  Location:  MAIN OR;  Service: Orthopedics   • HIP SURGERY Bilateral        Home Meds:  Current Outpatient Medications on File Prior to Visit   Medication Sig Dispense Refill   • ergocalciferol (ERGOCALCIFEROL) 1.25 MG (80982 UT) capsule Take 50,000 Units by mouth every 7 days     • esomeprazole (NexIUM) 40 MG capsule Take 1 capsule (40 mg total) by mouth in the morning 90 capsule 2   • etanercept (Enbrel) 50 mg/mL SOSY Inject 1 mL (50 mg) under the skin once a week 4 mL 5   • Evolocumab (Repatha SureClick) 140 MG/ML SOAJ Inject 140 mg under the skin every 14 (fourteen) days     • fluticasone (FLONASE) 50 mcg/act nasal spray 1 spray into each nostril 2 (two) times a day for 5 days (Patient not taking: Reported on 1/10/2024) 16 g 0   • folic acid (FOLVITE) 1 mg tablet  (Patient not taking: Reported on 1/15/2024)     • folic acid (FOLVITE) 1 mg tablet Take 1 mg by mouth daily     • lisinopril (ZESTRIL) 5 mg tablet Take 5 mg by mouth daily     • Testosterone 12.5 MG/ACT (1%) GEL        No current facility-administered medications on file prior to visit.       Allergies:  No Known Allergies    Social Hx:  Social History     Socioeconomic History   • Marital status: /Civil Union     Spouse name: Not on file   • Number of children: Not on file   • Years of education: Not on file   • Highest  education level: Not on file   Occupational History   • Not on file   Tobacco Use   • Smoking status: Never     Passive exposure: Past   • Smokeless tobacco: Never   Vaping Use   • Vaping status: Never Used   Substance and Sexual Activity   • Alcohol use: Yes     Comment: weekly   • Drug use: No   • Sexual activity: Not on file   Other Topics Concern   • Not on file   Social History Narrative   • Not on file     Social Determinants of Health     Financial Resource Strain: Not on file   Food Insecurity: Not on file   Transportation Needs: Not on file   Physical Activity: Not on file   Stress: Not on file   Social Connections: Not on file   Intimate Partner Violence: Not on file   Housing Stability: Not on file        Family Hx:    Family History   Problem Relation Age of Onset   • No Known Problems Mother    • No Known Problems Father    • No Known Problems Sister    • No Known Problems Brother    • No Known Problems Daughter    • No Known Problems Maternal Aunt    • No Known Problems Maternal Uncle    • No Known Problems Paternal Aunt    • No Known Problems Paternal Uncle    • No Known Problems Maternal Grandmother    • No Known Problems Maternal Grandfather    • No Known Problems Paternal Grandmother    • No Known Problems Paternal Grandfather    • No Known Problems Cousin    • Alcohol abuse Neg Hx    • Arthritis Neg Hx    • Dementia Neg Hx    • Cancer Neg Hx    • COPD Neg Hx    • Depression Neg Hx    • Lupus Neg Hx    • Drug abuse Neg Hx    • Early death Neg Hx    • Hearing loss Neg Hx    • Hyperlipidemia Neg Hx    • Learning disabilities Neg Hx    • Substance Abuse Neg Hx    • Stroke Neg Hx    • Vision loss Neg Hx    • Pulmonary embolism Neg Hx    • Deep vein thrombosis Neg Hx    • Mental illness Neg Hx    • Crohn's disease Neg Hx    • Rheum arthritis Neg Hx    • Psoriasis Neg Hx    • Asthma Neg Hx          Review of Systems   Constitutional:  Negative for chills and fever.   Respiratory: Negative.      Cardiovascular: Negative.    Gastrointestinal:         History of GERD   Genitourinary: Negative.    Musculoskeletal: Negative.    Neurological: Negative.    Hematological: Negative.    All other systems reviewed and are negative.    There were no vitals taken for this visit.    Physical Exam  Vitals reviewed.   Constitutional:       General: He is not in acute distress.     Appearance: He is obese.   HENT:      Head: Normocephalic and atraumatic.   Cardiovascular:      Rate and Rhythm: Normal rate and regular rhythm.   Pulmonary:      Effort: Pulmonary effort is normal. No respiratory distress.      Breath sounds: Normal breath sounds.   Abdominal:      General: There is no distension.      Palpations: Abdomen is soft.      Hernia: A hernia is present.      Comments: Palpable right inguinal hernia.    Musculoskeletal:         General: Normal range of motion.      Cervical back: Normal range of motion and neck supple.   Lymphadenopathy:      Cervical: No cervical adenopathy.   Skin:     General: Skin is warm and dry.   Neurological:      General: No focal deficit present.      Mental Status: He is alert.     Pertinent labs reviewed  Reviewed the CMP, CBC, and A1c (5.9) from 4 January 2024  Pertinent images and available reads personally reviewed  Reviewed the CT images and CT report from 18 January 2024  Pertinent notes reviewed  Reviewed the last PCP note by Dr. Naeem Mccain MD FACS  [facility]  [phone]

## 2024-02-28 ENCOUNTER — PROCEDURE VISIT (OUTPATIENT)
Dept: OBGYN CLINIC | Facility: CLINIC | Age: 54
End: 2024-02-28
Payer: COMMERCIAL

## 2024-02-28 DIAGNOSIS — L40.50 PSORIATIC ARTHRITIS (HCC): ICD-10-CM

## 2024-02-28 DIAGNOSIS — M17.12 PRIMARY LOCALIZED OSTEOARTHRITIS OF LEFT KNEE: ICD-10-CM

## 2024-02-28 DIAGNOSIS — M17.11 PRIMARY LOCALIZED OSTEOARTHRITIS OF RIGHT KNEE: Primary | ICD-10-CM

## 2024-02-28 PROCEDURE — 20610 DRAIN/INJ JOINT/BURSA W/O US: CPT | Performed by: ORTHOPAEDIC SURGERY

## 2024-02-28 RX ORDER — ETANERCEPT 50 MG/ML
SOLUTION SUBCUTANEOUS
Qty: 4 ML | Refills: 5 | Status: SHIPPED | OUTPATIENT
Start: 2024-02-28

## 2024-02-28 RX ORDER — HYALURONATE SODIUM 10 MG/ML
20 SYRINGE (ML) INTRAARTICULAR
Status: COMPLETED | OUTPATIENT
Start: 2024-02-28 | End: 2024-02-28

## 2024-02-28 RX ADMIN — Medication 20 MG: at 14:30

## 2024-02-28 NOTE — TELEPHONE ENCOUNTER
Reason for call:   [x] Refill   [] Prior Auth  [] Other:     Office:   [] PCP/Provider -   [x] Specialty/Provider - rheum    Emerald Tong MD  Rheumatology       Medication: etanercept (Enbrel) 50 mg/mL SOSY       Pharmacy: [facility]      Does the patient have enough for 3 days?   [] Yes   [x] No - Send as HP to POD

## 2024-02-28 NOTE — TELEPHONE ENCOUNTER
Pt called states he received a call from [facility] pharmacy that they sent a fax to the office but they never received it back for pt's enbrel. Pt is concerned that he needs his medication. Please call pt and send his medication to them asap. Thank you.

## 2024-02-28 NOTE — PROGRESS NOTES
Assessment/Plan:  1. Primary localized osteoarthritis of right knee  Large joint arthrocentesis: R knee      2. Primary localized osteoarthritis of left knee  Large joint arthrocentesis: L knee          Lm tolerated his first Euflexxa injection bilaterally today.  Follow-up in 1 week for the next injection.    Subjective:   Christopher Villela is a 53 y.o. male who presents for his first Euflexxa injection bilaterally today.         Past Medical History:   Diagnosis Date    GERD (gastroesophageal reflux disease)     Hyperlipidemia     Hypertension     Psoriatic arthritis (HCC)        Past Surgical History:   Procedure Laterality Date    COLLATERAL LIGAMENT REPAIR, ELBOW Right 7/7/2023    Procedure: LATERAL COLLATERAL LIGAMENT REPAIR WITH INTERNAL BRACE AUGMENTATION;  Surgeon: Tony Barraza;  Location: [facility];  Service: Orthopedics    HIP SURGERY Bilateral        Family History   Problem Relation Age of Onset    No Known Problems Mother     No Known Problems Father     No Known Problems Sister     No Known Problems Brother     No Known Problems Daughter     No Known Problems Maternal Aunt     No Known Problems Maternal Uncle     No Known Problems Paternal Aunt     No Known Problems Paternal Uncle     No Known Problems Maternal Grandmother     No Known Problems Maternal Grandfather     No Known Problems Paternal Grandmother     No Known Problems Paternal Grandfather     No Known Problems Cousin     Alcohol abuse Neg Hx     Arthritis Neg Hx     Dementia Neg Hx     Cancer Neg Hx     COPD Neg Hx     Depression Neg Hx     Lupus Neg Hx     Drug abuse Neg Hx     Early death Neg Hx     Hearing loss Neg Hx     Hyperlipidemia Neg Hx     Learning disabilities Neg Hx     Substance Abuse Neg Hx     Stroke Neg Hx     Vision loss Neg Hx     Pulmonary embolism Neg Hx     Deep vein thrombosis Neg Hx     Mental illness Neg Hx     Crohn's disease Neg Hx     Rheum arthritis Neg Hx     Psoriasis Neg Hx     Asthma Neg Hx        Social  History     Occupational History    Not on file   Tobacco Use    Smoking status: Never     Passive exposure: Past    Smokeless tobacco: Never   Vaping Use    Vaping status: Never Used   Substance and Sexual Activity    Alcohol use: Yes     Comment: weekly    Drug use: No    Sexual activity: Not on file         Current Outpatient Medications:     ergocalciferol (ERGOCALCIFEROL) 1.25 MG (28186 UT) capsule, Take 50,000 Units by mouth every 7 days, Disp: , Rfl:     esomeprazole (NexIUM) 40 MG capsule, Take 1 capsule (40 mg total) by mouth in the morning, Disp: 90 capsule, Rfl: 2    etanercept (Enbrel) 50 mg/mL SOSY, Inject 1 mL (50 mg) under the skin once a week, Disp: 4 mL, Rfl: 5    Evolocumab (Repatha SureClick) 140 MG/ML SOAJ, Inject 140 mg under the skin every 14 (fourteen) days, Disp: , Rfl:     fluticasone (FLONASE) 50 mcg/act nasal spray, 1 spray into each nostril 2 (two) times a day for 5 days (Patient not taking: Reported on 1/10/2024), Disp: 16 g, Rfl: 0    folic acid (FOLVITE) 1 mg tablet, , Disp: , Rfl:     folic acid (FOLVITE) 1 mg tablet, Take 1 mg by mouth daily, Disp: , Rfl:     lisinopril (ZESTRIL) 5 mg tablet, Take 5 mg by mouth daily, Disp: , Rfl:     Testosterone 12.5 MG/ACT (1%) GEL, , Disp: , Rfl:     No Known Allergies    Objective:  There were no vitals filed for this visit.  Pain Score:   4      Ortho Exam    Physical Exam    Large joint arthrocentesis: R knee  Universal Protocol:  Consent: Verbal consent obtained.  Risks and benefits: risks, benefits and alternatives were discussed  Consent given by: patient  Site marked: the operative site was marked  Supporting Documentation  Indications: pain   Procedure Details  Location: knee - R knee  Needle size: 22 G  Ultrasound guidance: no  Approach: anterolateral  Medications administered: 20 mg Sodium Hyaluronate (Viscosup) 20 MG/2ML  Specialty Pharmacy Supplied: received medications from pharmacy  Patient tolerance: patient tolerated the procedure  well with no immediate complications  Dressing:  Sterile dressing applied      Large joint arthrocentesis: L knee  Universal Protocol:  Consent: Verbal consent obtained.  Risks and benefits: risks, benefits and alternatives were discussed  Consent given by: patient  Site marked: the operative site was marked  Supporting Documentation  Indications: pain   Procedure Details  Location: knee - L knee  Needle size: 22 G  Ultrasound guidance: no  Approach: anterolateral  Medications administered: 20 mg Sodium Hyaluronate (Viscosup) 20 MG/2ML  Specialty Pharmacy Supplied: received medications from pharmacy  Patient tolerance: patient tolerated the procedure well with no immediate complications  Dressing:  Sterile dressing applied            This document was created using speech voice recognition software.   Grammatical errors, random word insertions, pronoun errors, and incomplete sentences are an occasional consequence of this system due to software limitations, ambient noise, and hardware issues.   Any formal questions or concerns about content, text, or information contained within the body of this dictation should be directly addressed to the provider for clarification.

## 2024-03-05 ENCOUNTER — TELEPHONE (OUTPATIENT)
Age: 54
End: 2024-03-05

## 2024-03-05 NOTE — TELEPHONE ENCOUNTER
Caller: Patient    Doctor: Mp    Reason for call: Patient is calling because he has his 2nd visco injection tomorrow at 930. 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    Call back#: [phone]

## 2024-03-06 ENCOUNTER — PROCEDURE VISIT (OUTPATIENT)
Dept: OBGYN CLINIC | Facility: CLINIC | Age: 54
End: 2024-03-06
Payer: COMMERCIAL

## 2024-03-06 DIAGNOSIS — M17.11 PRIMARY LOCALIZED OSTEOARTHRITIS OF RIGHT KNEE: Primary | ICD-10-CM

## 2024-03-06 DIAGNOSIS — M17.12 PRIMARY LOCALIZED OSTEOARTHRITIS OF LEFT KNEE: ICD-10-CM

## 2024-03-06 PROCEDURE — 20610 DRAIN/INJ JOINT/BURSA W/O US: CPT | Performed by: ORTHOPAEDIC SURGERY

## 2024-03-06 RX ORDER — HYALURONATE SODIUM 10 MG/ML
20 SYRINGE (ML) INTRAARTICULAR
Status: COMPLETED | OUTPATIENT
Start: 2024-03-06 | End: 2024-03-06

## 2024-03-06 RX ADMIN — Medication 20 MG: at 09:30

## 2024-03-06 NOTE — PROGRESS NOTES
Assessment/Plan:  1. Primary localized osteoarthritis of right knee  Large joint arthrocentesis: R knee      2. Primary localized osteoarthritis of left knee  Large joint arthrocentesis: L knee        Scribe Attestation      I,:  Hector Mendez am acting as a scribe while in the presence of the attending physician.:       I,:  Isaias Gresham DO personally performed the services described in this documentation    as scribed in my presence.:               Lm tolerated his second Euflexxa injection bilaterally today.  Follow-up in 1 week for the next injection    Subjective:   Christopher Villela is a 53 y.o. male who presents for his second Euflexxa injection bilaterally today.      Review of Systems      Past Medical History:   Diagnosis Date    GERD (gastroesophageal reflux disease)     Hyperlipidemia     Hypertension     Psoriatic arthritis (HCC)        Past Surgical History:   Procedure Laterality Date    COLLATERAL LIGAMENT REPAIR, ELBOW Right 7/7/2023    Procedure: LATERAL COLLATERAL LIGAMENT REPAIR WITH INTERNAL BRACE AUGMENTATION;  Surgeon: Tony Barraza;  Location: [facility];  Service: Orthopedics    HIP SURGERY Bilateral        Family History   Problem Relation Age of Onset    No Known Problems Mother     No Known Problems Father     No Known Problems Sister     No Known Problems Brother     No Known Problems Daughter     No Known Problems Maternal Aunt     No Known Problems Maternal Uncle     No Known Problems Paternal Aunt     No Known Problems Paternal Uncle     No Known Problems Maternal Grandmother     No Known Problems Maternal Grandfather     No Known Problems Paternal Grandmother     No Known Problems Paternal Grandfather     No Known Problems Cousin     Alcohol abuse Neg Hx     Arthritis Neg Hx     Dementia Neg Hx     Cancer Neg Hx     COPD Neg Hx     Depression Neg Hx     Lupus Neg Hx     Drug abuse Neg Hx     Early death Neg Hx     Hearing loss Neg Hx     Hyperlipidemia Neg Hx     Learning disabilities Neg  "Hx     Substance Abuse Neg Hx     Stroke Neg Hx     Vision loss Neg Hx     Pulmonary embolism Neg Hx     Deep vein thrombosis Neg Hx     Mental illness Neg Hx     Crohn's disease Neg Hx     Rheum arthritis Neg Hx     Psoriasis Neg Hx     Asthma Neg Hx        Social History     Occupational History    Not on file   Tobacco Use    Smoking status: Never     Passive exposure: Past    Smokeless tobacco: Never   Vaping Use    Vaping status: Never Used   Substance and Sexual Activity    Alcohol use: Yes     Comment: weekly    Drug use: No    Sexual activity: Not on file         Current Outpatient Medications:     ergocalciferol (ERGOCALCIFEROL) 1.25 MG (33366 UT) capsule, Take 50,000 Units by mouth every 7 days, Disp: , Rfl:     esomeprazole (NexIUM) 40 MG capsule, Take 1 capsule (40 mg total) by mouth in the morning, Disp: 90 capsule, Rfl: 2    etanercept (Enbrel) 50 mg/mL SOSY, Inject 1 mL (50 mg) under the skin once a week, Disp: 4 mL, Rfl: 5    Evolocumab (Repatha SureClick) 140 MG/ML SOAJ, Inject 140 mg under the skin every 14 (fourteen) days, Disp: , Rfl:     fluticasone (FLONASE) 50 mcg/act nasal spray, 1 spray into each nostril 2 (two) times a day for 5 days (Patient not taking: Reported on 1/10/2024), Disp: 16 g, Rfl: 0    folic acid (FOLVITE) 1 mg tablet, , Disp: , Rfl:     folic acid (FOLVITE) 1 mg tablet, Take 1 mg by mouth daily, Disp: , Rfl:     lisinopril (ZESTRIL) 5 mg tablet, Take 5 mg by mouth daily, Disp: , Rfl:     Testosterone 12.5 MG/ACT (1%) GEL, , Disp: , Rfl:     No Known Allergies    Objective:  There were no vitals filed for this visit.    Ortho Exam    Physical Exam    Large joint arthrocentesis: R knee  Universal Protocol:  Consent: Verbal consent obtained.  Risks and benefits: risks, benefits and alternatives were discussed  Consent given by: patient  Time out: Immediately prior to procedure a \"time out\" was called to verify the correct patient, procedure, equipment, support staff and " "site/side marked as required.  Patient understanding: patient states understanding of the procedure being performed  Patient consent: the patient's understanding of the procedure matches consent given  Patient identity confirmed: verbally with patient  Supporting Documentation  Indications: pain   Procedure Details  Location: knee - R knee  Preparation: Patient was prepped and draped in the usual sterile fashion  Needle size: 22 G  Ultrasound guidance: no  Approach: anterolateral  Medications administered: 20 mg Sodium Hyaluronate (Viscosup) 20 MG/2ML  Specialty Pharmacy Supplied: received medications from pharmacy  Patient tolerance: patient tolerated the procedure well with no immediate complications  Dressing:  Sterile dressing applied      Large joint arthrocentesis: L knee  Universal Protocol:  Consent: Verbal consent obtained.  Risks and benefits: risks, benefits and alternatives were discussed  Consent given by: patient  Time out: Immediately prior to procedure a \"time out\" was called to verify the correct patient, procedure, equipment, support staff and site/side marked as required.  Patient understanding: patient states understanding of the procedure being performed  Patient consent: the patient's understanding of the procedure matches consent given  Patient identity confirmed: verbally with patient  Supporting Documentation  Indications: pain   Procedure Details  Location: knee - L knee  Preparation: Patient was prepped and draped in the usual sterile fashion  Needle size: 22 G  Ultrasound guidance: no  Approach: anterolateral  Medications administered: 20 mg Sodium Hyaluronate (Viscosup) 20 MG/2ML  Specialty Pharmacy Supplied: received medications from pharmacy  Patient tolerance: patient tolerated the procedure well with no immediate complications  Dressing:  Sterile dressing applied             This document was created using speech voice recognition software.   Grammatical errors, random word " insertions, pronoun errors, and incomplete sentences are an occasional consequence of this system due to software limitations, ambient noise, and hardware issues.   Any formal questions or concerns about content, text, or information contained within the body of this dictation should be directly addressed to the provider for clarification.

## 2024-03-13 ENCOUNTER — PROCEDURE VISIT (OUTPATIENT)
Dept: OBGYN CLINIC | Facility: CLINIC | Age: 54
End: 2024-03-13
Payer: COMMERCIAL

## 2024-03-13 DIAGNOSIS — M17.12 PRIMARY LOCALIZED OSTEOARTHRITIS OF LEFT KNEE: ICD-10-CM

## 2024-03-13 DIAGNOSIS — M17.11 PRIMARY LOCALIZED OSTEOARTHRITIS OF RIGHT KNEE: Primary | ICD-10-CM

## 2024-03-13 PROCEDURE — 20610 DRAIN/INJ JOINT/BURSA W/O US: CPT | Performed by: ORTHOPAEDIC SURGERY

## 2024-03-13 RX ORDER — DICLOFENAC SODIUM 75 MG/1
75 TABLET, DELAYED RELEASE ORAL 2 TIMES DAILY
Qty: 60 TABLET | Refills: 0 | Status: SHIPPED | OUTPATIENT
Start: 2024-03-13

## 2024-03-13 RX ORDER — HYALURONATE SODIUM 10 MG/ML
20 SYRINGE (ML) INTRAARTICULAR
Status: COMPLETED | OUTPATIENT
Start: 2024-03-13 | End: 2024-03-13

## 2024-03-13 RX ADMIN — Medication 20 MG: at 15:30

## 2024-03-13 NOTE — PROGRESS NOTES
Assessment/Plan:  1. Primary localized osteoarthritis of right knee  diclofenac (VOLTAREN) 75 mg EC tablet      2. Primary localized osteoarthritis of left knee          Lm tolerated bilateral  Euflexxa injections today.  We could repeat these injections in 6 months if clinically indicated.  We will try anti-inflammatories and ice for his increased inflammatory pain in the right lower extremity.    Subjective:   Christopher Villela is a 53 y.o. male who presents for his third Euflexxa injection bilaterally today.  He has been feeling increased pain over the lateral portion of the right leg and fibular head region.  Denies any trauma.  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       Past Medical History:   Diagnosis Date    GERD (gastroesophageal reflux disease)     Hyperlipidemia     Hypertension     Psoriatic arthritis (HCC)        Past Surgical History:   Procedure Laterality Date    COLLATERAL LIGAMENT REPAIR, ELBOW Right 7/7/2023    Procedure: LATERAL COLLATERAL LIGAMENT REPAIR WITH INTERNAL BRACE AUGMENTATION;  Surgeon: Tony Barraza;  Location: [facility];  Service: Orthopedics    HIP SURGERY Bilateral        Family History   Problem Relation Age of Onset    No Known Problems Mother     No Known Problems Father     No Known Problems Sister     No Known Problems Brother     No Known Problems Daughter     No Known Problems Maternal Aunt     No Known Problems Maternal Uncle     No Known Problems Paternal Aunt     No Known Problems Paternal Uncle     No Known Problems Maternal Grandmother     No Known Problems Maternal Grandfather     No Known Problems Paternal Grandmother     No Known Problems Paternal Grandfather     No Known Problems Cousin     Alcohol abuse Neg Hx     Arthritis Neg Hx     Dementia Neg Hx     Cancer Neg Hx     COPD Neg Hx     Depression Neg Hx     Lupus Neg Hx     Drug abuse Neg Hx     Early death Neg Hx     Hearing loss Neg Hx     Hyperlipidemia Neg Hx     Learning disabilities Neg Hx      Substance Abuse Neg Hx     Stroke Neg Hx     Vision loss Neg Hx     Pulmonary embolism Neg Hx     Deep vein thrombosis Neg Hx     Mental illness Neg Hx     Crohn's disease Neg Hx     Rheum arthritis Neg Hx     Psoriasis Neg Hx     Asthma Neg Hx        Social History     Occupational History    Not on file   Tobacco Use    Smoking status: Never     Passive exposure: Past    Smokeless tobacco: Never   Vaping Use    Vaping status: Never Used   Substance and Sexual Activity    Alcohol use: Yes     Comment: weekly    Drug use: No    Sexual activity: Not on file         Current Outpatient Medications:     diclofenac (VOLTAREN) 75 mg EC tablet, Take 1 tablet (75 mg total) by mouth 2 (two) times a day, Disp: 60 tablet, Rfl: 0    ergocalciferol (ERGOCALCIFEROL) 1.25 MG (19298 UT) capsule, Take 50,000 Units by mouth every 7 days, Disp: , Rfl:     esomeprazole (NexIUM) 40 MG capsule, Take 1 capsule (40 mg total) by mouth in the morning, Disp: 90 capsule, Rfl: 2    etanercept (Enbrel) 50 mg/mL SOSY, Inject 1 mL (50 mg) under the skin once a week, Disp: 4 mL, Rfl: 5    Evolocumab (Repatha SureClick) 140 MG/ML SOAJ, Inject 140 mg under the skin every 14 (fourteen) days, Disp: , Rfl:     fluticasone (FLONASE) 50 mcg/act nasal spray, 1 spray into each nostril 2 (two) times a day for 5 days (Patient not taking: Reported on 1/10/2024), Disp: 16 g, Rfl: 0    folic acid (FOLVITE) 1 mg tablet, , Disp: , Rfl:     folic acid (FOLVITE) 1 mg tablet, Take 1 mg by mouth daily, Disp: , Rfl:     lisinopril (ZESTRIL) 5 mg tablet, Take 5 mg by mouth daily, Disp: , Rfl:     Testosterone 12.5 MG/ACT (1%) GEL, , Disp: , Rfl:     No Known Allergies    Objective:  There were no vitals filed for this visit.         Right Knee Exam     Tenderness   Right knee tenderness location: Tenderness along fibular head and Sue's tubercle.    Other   Effusion: no effusion present          Observations     Right Knee   Negative for effusion.       Physical  Exam  Vitals reviewed.   Constitutional:       Appearance: He is well-developed.   HENT:      Head: Normocephalic and atraumatic.   Eyes:      Conjunctiva/sclera: Conjunctivae normal.      Pupils: Pupils are equal, round, and reactive to light.   Cardiovascular:      Rate and Rhythm: Normal rate.      Pulses: Normal pulses.   Pulmonary:      Effort: Pulmonary effort is normal. No respiratory distress.   Musculoskeletal:      Cervical back: Normal range of motion and neck supple.      Right knee: No effusion.      Comments: As noted in HPI   Skin:     General: Skin is warm and dry.   Neurological:      General: No focal deficit present.      Mental Status: He is alert and oriented to person, place, and time.   Psychiatric:         Mood and Affect: Mood normal.         Behavior: Behavior normal.         Large joint arthrocentesis: R knee  Universal Protocol:  Consent: Verbal consent obtained.  Risks and benefits: risks, benefits and alternatives were discussed  Consent given by: patient  Site marked: the operative site was marked  Supporting Documentation  Indications: pain   Procedure Details  Location: knee - R knee  Needle size: 22 G  Ultrasound guidance: no  Approach: anterolateral  Medications administered: 20 mg Sodium Hyaluronate (Viscosup) 20 MG/2ML  Specialty Pharmacy Supplied: received medications from pharmacy  Patient tolerance: patient tolerated the procedure well with no immediate complications  Dressing:  Sterile dressing applied      Large joint arthrocentesis: L knee  Universal Protocol:  Consent: Verbal consent obtained.  Risks and benefits: risks, benefits and alternatives were discussed  Consent given by: patient  Site marked: the operative site was marked  Supporting Documentation  Indications: pain   Procedure Details  Location: knee - L knee  Needle size: 22 G  Ultrasound guidance: no  Approach: anterolateral  Medications administered: 20 mg Sodium Hyaluronate (Viscosup) 20 MG/2ML  Specialty  Pharmacy Supplied: received medications from pharmacy  Patient tolerance: patient tolerated the procedure well with no immediate complications  Dressing:  Sterile dressing applied            This document was created using speech voice recognition software.   Grammatical errors, random word insertions, pronoun errors, and incomplete sentences are an occasional consequence of this system due to software limitations, ambient noise, and hardware issues.   Any formal questions or concerns about content, text, or information contained within the body of this dictation should be directly addressed to the provider for clarification.

## 2024-04-08 ENCOUNTER — TELEPHONE (OUTPATIENT)
Age: 54
End: 2024-04-08

## 2024-04-26 ENCOUNTER — TELEPHONE (OUTPATIENT)
Age: 54
End: 2024-04-26

## 2024-04-26 DIAGNOSIS — L40.50 PSORIATIC ARTHRITIS (HCC): ICD-10-CM

## 2024-04-26 RX ORDER — ETANERCEPT 50 MG/ML
SOLUTION SUBCUTANEOUS
Qty: 4 ML | Refills: 5 | Status: SHIPPED | OUTPATIENT
Start: 2024-04-26

## 2024-04-26 NOTE — TELEPHONE ENCOUNTER
Incoming call:    Patient called pharmacy and they stated they need further information which was described as clinical necessity.     Requesting Silvia call him back.   [phone]

## 2024-04-26 NOTE — TELEPHONE ENCOUNTER
Reason for call:   [] Refill   [x] Prior Auth  [] Other:     Office:   [] PCP/Provider -   [x] Specialty/Provider - fara Church    Medication:           Pharmacy:   [facility]

## 2024-04-26 NOTE — TELEPHONE ENCOUNTER
PHARMACY STATING NO REFILLS  Patient called because pharmacy told him that they have no more refills for him and that they had been trying to get a hold of the office for 2 weeks to get refills with no response. I informed pt that 4mL with 5 refills were sent 2/28 which is a 6 month supply and that there are no records of them reaching out to us. I informed pt that we could send in another 6 month supply and hopefully in the future he does not run into this issue again. I also told him to reach out to the pharmacy and inform them of the 6 month supply sent on 2/28 to see if they could find it. I apologized for any inconvenience.    Reason for call:   [x] Refill   [] Prior Auth  [] Other:     Office:   [] PCP/Provider -   [x] Specialty/Provider - Rheum    Medication:   etanercept (Enbrel) 50 mg/mL SOSY - Inject 1 mL (50 mg) under the skin once a week     Pharmacy:  [place] - [facility]     Does the patient have enough for 3 days?   [] Yes   [x] No - Send as HP to POD

## 2024-04-26 NOTE — TELEPHONE ENCOUNTER
PA for Enbrel 50mg    Submitted via    [x]CMM-KEY [id number]  [][employer]-Case ID #   []Faxed to plan   []Other website   []Phone call Case ID #     Office notes sent, clinical questions answered. Awaiting determination    Turnaround time for your insurance to make a decision on your Prior Authorization can take 7-21 business days.

## 2024-04-30 NOTE — TELEPHONE ENCOUNTER
Patient called stating insurance is saying they never received the PA form. Claimed they faxed it to the office 15 times.Can the patient be updated, he is getting nervous that he will run out of his medication.

## 2024-05-01 NOTE — TELEPHONE ENCOUNTER
Patient calling for Enbrel prior auth update.     Provided fax number to ensure correct.  [phone]

## 2024-05-01 NOTE — TELEPHONE ENCOUNTER
Reshaund with [employer] calling for fax number. She will be faxing over documents pertaining to [employer].

## 2024-05-02 NOTE — TELEPHONE ENCOUNTER
PA for Enbrel    Submitted via    []CMM-KEY   [][employer]-Case ID #   [x]Faxed to plan [phone] Urgent  []Other website   []Phone call Case ID #     Office notes sent, clinical questions answered. Awaiting determination    Turnaround time for your insurance to make a decision on your Prior Authorization can take 7-21 business days.      Called pt and advised

## 2024-05-06 NOTE — TELEPHONE ENCOUNTER
Called to follow up on [employer] submitted on 05/02. Per [employer] they did not receive the auth . I faxed again today to both numbers provided. They are unable to take verbal auth over the phone

## 2024-05-07 NOTE — TELEPHONE ENCOUNTER
PA for Enbrel Approved     Date(s) approved until 05/02/2024      Patient advised by          [] MyChart Message  [x] Phone call   []LMOM  []L/M to call office as no active Communication consent on file  []Unable to leave detailed message as VM not approved on Communication consent       Pharmacy advised by    [x]Fax  []Phone call    Approval letter scanned into Media Yes    Medication sent to pharmacy on 04/26/2024. No further action needed

## 2024-06-03 ENCOUNTER — OFFICE VISIT (OUTPATIENT)
Dept: INTERNAL MEDICINE CLINIC | Facility: CLINIC | Age: 54
End: 2024-06-03
Payer: COMMERCIAL

## 2024-06-03 VITALS
OXYGEN SATURATION: 97 % | HEART RATE: 67 BPM | SYSTOLIC BLOOD PRESSURE: 118 MMHG | BODY MASS INDEX: 39.4 KG/M2 | DIASTOLIC BLOOD PRESSURE: 70 MMHG | WEIGHT: 266 LBS | HEIGHT: 69 IN

## 2024-06-03 DIAGNOSIS — E66.01 MORBID OBESITY (HCC): ICD-10-CM

## 2024-06-03 PROCEDURE — 99213 OFFICE O/P EST LOW 20 MIN: CPT | Performed by: INTERNAL MEDICINE

## 2024-06-03 RX ORDER — CEFADROXIL 1000 MG/1
1 TABLET ORAL 2 TIMES DAILY
Qty: 20 TABLET | Refills: 0 | Status: SHIPPED | OUTPATIENT
Start: 2024-06-03 | End: 2024-06-13

## 2024-06-03 NOTE — ASSESSMENT & PLAN NOTE
BMI now 39.28 losing weight significantly    Counseling done as follows    BMI Counseling:      The BMI is above normal. Know Body weight goal    Weigh yourself daily or weekly as per your doctor    Nutrition recommendations include decreasing portion sizes, encouraging healthy choices of fruits and vegetables, decreasing     fast food intake, consuming healthier snacks, limiting drinks that contain sugar, moderation in carbohydrate intake, increasing     intake of lean protein, reducing intake of saturated and trans fat and reducing intake of cholesterol  Discussed options of HealthyCORE-Intensive Lifestyle Intervention Program, Very Low Calorie Diet-VLCD and Conservative Program and the role of weight loss medications.  - Explained the importance of making lifestyle changes in addition to starting anti-obesity medications.   -

## 2024-06-03 NOTE — ASSESSMENT & PLAN NOTE
Redness swelling tenderness around umbilical area around for 2 days    Will treat with Duricef 1 g twice a day

## 2024-06-03 NOTE — PROGRESS NOTES
Dr. Hartley's Office Visit Note  24     Christopher Villela 53 y.o. male MRN: [id number]  : 1970    Assessment:     1. Omphalitis  Assessment & Plan:  Redness swelling tenderness around umbilical area around for 2 days    Will treat with Duricef 1 g twice a day  Orders:  -     cefadroxil (DURICEF) 1000 mg tablet; Take 1 tablet (1,000 mg total) by mouth 2 (two) times a day for 10 days  2. Morbid obesity (HCC)  Assessment & Plan:  BMI now 39.28 losing weight significantly    Counseling done as follows    BMI Counseling:      The BMI is above normal. Know Body weight goal    Weigh yourself daily or weekly as per your doctor    Nutrition recommendations include decreasing portion sizes, encouraging healthy choices of fruits and vegetables, decreasing     fast food intake, consuming healthier snacks, limiting drinks that contain sugar, moderation in carbohydrate intake, increasing     intake of lean protein, reducing intake of saturated and trans fat and reducing intake of cholesterol  Discussed options of HealthyCORE-Intensive Lifestyle Intervention Program, Very Low Calorie Diet-VLCD and Conservative Program and the role of weight loss medications.  - Explained the importance of making lifestyle changes in addition to starting anti-obesity medications.   -          Discussion Summary and Plan:  Today's care plan and medications were reviewed with patient in detail and all their questions answered to their satisfaction.    Chief Complaint   Patient presents with   • belly button swollen and sore.     Belly button swollen and sore started two days ago.      Subjective:  Came in with painful swelling redness around the umbilical area denies any injury or insect bite no nausea vomiting diarrhea fever chills        The following portions of the patient's history were reviewed and updated as appropriate: allergies, current medications, past family history, past medical history, past social history, past surgical history  and problem list.    Review of Systems   Constitutional:  Positive for activity change and fatigue. Negative for appetite change, chills, diaphoresis, fever and unexpected weight change.   HENT:  Negative for congestion, dental problem, drooling, ear discharge, ear pain, facial swelling, hearing loss, mouth sores, nosebleeds, postnasal drip, rhinorrhea, sinus pressure, sneezing, sore throat, tinnitus, trouble swallowing and voice change.    Eyes:  Negative for photophobia, pain, discharge, redness, itching and visual disturbance.   Respiratory:  Negative for apnea, cough, choking, chest tightness, shortness of breath, wheezing and stridor.    Cardiovascular:  Negative for chest pain, palpitations and leg swelling.   Gastrointestinal:  Negative for abdominal distention, anal bleeding, blood in stool, constipation, diarrhea, nausea, rectal pain and vomiting.   Endocrine: Negative for cold intolerance, heat intolerance, polydipsia, polyphagia and polyuria.   Genitourinary:  Positive for flank pain. Negative for decreased urine volume, difficulty urinating, dysuria, enuresis, frequency, genital sores, hematuria and urgency.   Musculoskeletal:  Positive for arthralgias and gait problem. Negative for back pain, myalgias, neck pain and neck stiffness.   Skin:  Negative for color change, pallor, rash and wound.   Allergic/Immunologic: Negative.  Negative for environmental allergies, food allergies and immunocompromised state.   Neurological:  Negative for dizziness, tremors, seizures, syncope, facial asymmetry, speech difficulty, weakness, light-headedness, numbness and headaches.   Psychiatric/Behavioral:  Negative for agitation, behavioral problems, confusion, decreased concentration, dysphoric mood, hallucinations, self-injury, sleep disturbance and suicidal ideas. The patient is not nervous/anxious and is not hyperactive.          Historical Information   Patient Active Problem List   Diagnosis   • Sensorineural hearing  loss (SNHL) of both ears   • Abnormal LFTs   • BPH (benign prostatic hyperplasia)   • Psoriatic arthritis (HCC)   • GERD (gastroesophageal reflux disease)   • Mixed hyperlipidemia   • Complete tear of lateral collateral ligament of right elbow   • Post-operative state   • Sprain of right elbow   • S/P musculoskeletal system surgery   • Hypogonadism in male   • Primary hypertension   • Morbid obesity (HCC)   • Hyperprolactinemia (HCC)   • Right lower quadrant abdominal mass   • Right flank pain   • Omphalitis     Past Medical History:   Diagnosis Date   • GERD (gastroesophageal reflux disease)    • Hyperlipidemia    • Hypertension    • Psoriatic arthritis (HCC)      Past Surgical History:   Procedure Laterality Date   • COLLATERAL LIGAMENT REPAIR, ELBOW Right 7/7/2023    Procedure: LATERAL COLLATERAL LIGAMENT REPAIR WITH INTERNAL BRACE AUGMENTATION;  Surgeon: Tony Barraza;  Location:  MAIN OR;  Service: Orthopedics   • HIP SURGERY Bilateral      Social History     Substance and Sexual Activity   Alcohol Use Yes    Comment: weekly     Social History     Substance and Sexual Activity   Drug Use No     Social History     Tobacco Use   Smoking Status Never   • Passive exposure: Past   Smokeless Tobacco Never     Family History   Problem Relation Age of Onset   • No Known Problems Mother    • No Known Problems Father    • No Known Problems Sister    • No Known Problems Brother    • No Known Problems Daughter    • No Known Problems Maternal Aunt    • No Known Problems Maternal Uncle    • No Known Problems Paternal Aunt    • No Known Problems Paternal Uncle    • No Known Problems Maternal Grandmother    • No Known Problems Maternal Grandfather    • No Known Problems Paternal Grandmother    • No Known Problems Paternal Grandfather    • No Known Problems Cousin    • Alcohol abuse Neg Hx    • Arthritis Neg Hx    • Dementia Neg Hx    • Cancer Neg Hx    • COPD Neg Hx    • Depression Neg Hx    • Lupus Neg Hx    • Drug abuse Neg  Hx    • Early death Neg Hx    • Hearing loss Neg Hx    • Hyperlipidemia Neg Hx    • Learning disabilities Neg Hx    • Substance Abuse Neg Hx    • Stroke Neg Hx    • Vision loss Neg Hx    • Pulmonary embolism Neg Hx    • Deep vein thrombosis Neg Hx    • Mental illness Neg Hx    • Crohn's disease Neg Hx    • Rheum arthritis Neg Hx    • Psoriasis Neg Hx    • Asthma Neg Hx      Health Maintenance Due   Topic   • COVID-19 Vaccine (1)   • Pneumococcal Vaccine: Pediatrics (0 to 5 Years) and At-Risk Patients (6 to 64 Years) (1 of 2 - PCV)   • HIV Screening    • Zoster Vaccine (1 of 2)   • Annual Physical    • Influenza Vaccine (Season Ended)      Meds/Allergies       Current Outpatient Medications:   •  cefadroxil (DURICEF) 1000 mg tablet, Take 1 tablet (1,000 mg total) by mouth 2 (two) times a day for 10 days, Disp: 20 tablet, Rfl: 0  •  esomeprazole (NexIUM) 40 MG capsule, Take 1 capsule (40 mg total) by mouth in the morning, Disp: 90 capsule, Rfl: 2  •  etanercept (Enbrel) 50 mg/mL SOSY, Inject 1 mL (50 mg) under the skin once a week, Disp: 4 mL, Rfl: 5  •  Evolocumab (Repatha SureClick) 140 MG/ML SOAJ, Inject 140 mg under the skin every 14 (fourteen) days, Disp: , Rfl:   •  folic acid (FOLVITE) 1 mg tablet, Take 1 mg by mouth daily, Disp: , Rfl:   •  lisinopril (ZESTRIL) 5 mg tablet, Take 5 mg by mouth daily, Disp: , Rfl:   •  Testosterone 12.5 MG/ACT (1%) GEL, , Disp: , Rfl:   •  diclofenac (VOLTAREN) 75 mg EC tablet, Take 1 tablet (75 mg total) by mouth 2 (two) times a day (Patient not taking: Reported on 6/3/2024), Disp: 60 tablet, Rfl: 0  •  ergocalciferol (ERGOCALCIFEROL) 1.25 MG (76538 UT) capsule, Take 50,000 Units by mouth every 7 days (Patient not taking: Reported on 6/3/2024), Disp: , Rfl:   •  fluticasone (FLONASE) 50 mcg/act nasal spray, 1 spray into each nostril 2 (two) times a day for 5 days (Patient not taking: Reported on 6/3/2024), Disp: 16 g, Rfl: 0  •  folic acid (FOLVITE) 1 mg tablet, , Disp: , Rfl:  "      Objective:    Vitals:   /70   Pulse 67   Ht 5' 9\" (1.753 m)   Wt 121 kg (266 lb)   SpO2 97%   BMI 39.28 kg/m²   Body mass index is 39.28 kg/m².  Vitals:    06/03/24 1557   Weight: 121 kg (266 lb)       Physical Exam  Vitals and nursing note reviewed.   Constitutional:       General: He is not in acute distress.     Appearance: He is well-developed. He is obese. He is not ill-appearing, toxic-appearing or diaphoretic.   HENT:      Head: Normocephalic and atraumatic.      Right Ear: External ear normal.      Left Ear: External ear normal.      Nose: Nose normal.      Mouth/Throat:      Pharynx: No oropharyngeal exudate.   Eyes:      General: Lids are normal. Lids are everted, no foreign bodies appreciated. No scleral icterus.        Right eye: No discharge.         Left eye: No discharge.      Conjunctiva/sclera: Conjunctivae normal.      Pupils: Pupils are equal, round, and reactive to light.   Neck:      Thyroid: No thyromegaly.      Vascular: Normal carotid pulses. No carotid bruit, hepatojugular reflux or JVD.      Trachea: No tracheal tenderness or tracheal deviation.   Cardiovascular:      Rate and Rhythm: Normal rate and regular rhythm.      Pulses: Normal pulses.      Heart sounds: Normal heart sounds. No murmur heard.     No friction rub. No gallop.   Pulmonary:      Effort: Pulmonary effort is normal. No respiratory distress.      Breath sounds: Normal breath sounds. No stridor. No wheezing or rales.   Chest:      Chest wall: No tenderness.   Abdominal:      General: Bowel sounds are normal. There is no distension.      Palpations: Abdomen is soft. There is no mass.      Tenderness: There is no abdominal tenderness. There is no guarding or rebound.   Musculoskeletal:         General: No tenderness or deformity. Normal range of motion.      Cervical back: Normal range of motion and neck supple. No edema, erythema or rigidity. No spinous process tenderness or muscular tenderness. Normal range " of motion.   Lymphadenopathy:      Head:      Right side of head: No submental, submandibular, tonsillar, preauricular or posterior auricular adenopathy.      Left side of head: No submental, submandibular, tonsillar, preauricular, posterior auricular or occipital adenopathy.      Cervical: No cervical adenopathy.      Right cervical: No superficial, deep or posterior cervical adenopathy.     Left cervical: No superficial, deep or posterior cervical adenopathy.      Upper Body:      Right upper body: No pectoral adenopathy.      Left upper body: No pectoral adenopathy.   Skin:     General: Skin is warm and dry.      Coloration: Skin is not pale.      Findings: No erythema or rash.   Neurological:      General: No focal deficit present.      Mental Status: He is alert and oriented to person, place, and time.      Cranial Nerves: No cranial nerve deficit.      Sensory: No sensory deficit.      Motor: No tremor, abnormal muscle tone or seizure activity.      Coordination: Coordination normal.      Gait: Gait normal.      Deep Tendon Reflexes: Reflexes are normal and symmetric. Reflexes normal.   Psychiatric:         Behavior: Behavior normal.         Thought Content: Thought content normal.         Judgment: Judgment normal.         Lab Review   No visits with results within 2 Month(s) from this visit.   Latest known visit with results is:   Orders Only on 01/04/2024   Component Date Value Ref Range Status   • White Blood Cell Count 01/04/2024 6.1  3.4 - 10.8 x10E3/uL Final   • Red Blood Cell Count 01/04/2024 5.15  4.14 - 5.80 x10E6/uL Final   • Hemoglobin 01/04/2024 15.2  13.0 - 17.7 g/dL Final   • HCT 01/04/2024 46.3  37.5 - 51.0 % Final   • MCV 01/04/2024 90  79 - 97 fL Final   • MCH 01/04/2024 29.5  26.6 - 33.0 pg Final   • MCHC 01/04/2024 32.8  31.5 - 35.7 g/dL Final   • RDW 01/04/2024 13.0  11.6 - 15.4 % Final   • Platelet Count 01/04/2024 225  150 - 450 x10E3/uL Final   • Neutrophils 01/04/2024 50  Not Estab. %  Final   • Lymphocytes 01/04/2024 35  Not Estab. % Final   • Monocytes 01/04/2024 12  Not Estab. % Final   • Eosinophils 01/04/2024 3  Not Estab. % Final   • Basophils PCT 01/04/2024 0  Not Estab. % Final   • Neutrophils (Absolute) 01/04/2024 3.0  1.4 - 7.0 x10E3/uL Final   • Lymphocytes (Absolute) 01/04/2024 2.1  0.7 - 3.1 x10E3/uL Final   • Monocytes (Absolute) 01/04/2024 0.7  0.1 - 0.9 x10E3/uL Final   • Eosinophils (Absolute) 01/04/2024 0.2  0.0 - 0.4 x10E3/uL Final   • Basophils ABS 01/04/2024 0.0  0.0 - 0.2 x10E3/uL Final   • Immature Granulocytes 01/04/2024 0  Not Estab. % Final   • Immature Granulocytes (Absolute) 01/04/2024 0.0  0.0 - 0.1 x10E3/uL Final    Comment: A hand-written panel/profile was received from your office. In  accordance with the [facility] Ambiguous Test Code Policy dated July 2003, we have assigned CBC with Differential/Platelet, Test Code  #[id number] to this request. If this is not the testing you wished to  receive on this specimen, please contact the [facility] Client Inquiry/  Technical Services Department to clarify the test order. We  appreciate your business.     • Glucose, Random 01/04/2024 85  70 - 99 mg/dL Final   • BUN 01/04/2024 11  6 - 24 mg/dL Final   • Creatinine 01/04/2024 0.97  0.76 - 1.27 mg/dL Final   • eGFR 01/04/2024 92  >59 mL/min/1.73 Final   • SL AMB BUN/CREATININE RATIO 01/04/2024 11  9 - 20 Final   • Sodium 01/04/2024 137  134 - 144 mmol/L Final   • Potassium 01/04/2024 4.4  3.5 - 5.2 mmol/L Final   • Chloride 01/04/2024 98  96 - 106 mmol/L Final   • CO2 01/04/2024 22  20 - 29 mmol/L Final   • CALCIUM 01/04/2024 9.9  8.7 - 10.2 mg/dL Final   • Protein, Total 01/04/2024 7.7  6.0 - 8.5 g/dL Final   • Albumin 01/04/2024 4.9  3.8 - 4.9 g/dL Final   • Globulin, Total 01/04/2024 2.8  1.5 - 4.5 g/dL Final   • Albumin/Globulin Ratio 01/04/2024 1.8  1.2 - 2.2 Final   • TOTAL BILIRUBIN 01/04/2024 0.7  0.0 - 1.2 mg/dL Final   • Alk Phos Isoenzymes 01/04/2024 49  44 - 121 IU/L Final    • AST 01/04/2024 44 (H)  0 - 40 IU/L Final   • ALT 01/04/2024 47 (H)  0 - 44 IU/L Final   • Specific Gravity 01/04/2024 1.024  1.005 - 1.030 Final   • Ph 01/04/2024 6.0  5.0 - 7.5 Final   • Color UA 01/04/2024 Yellow  Yellow Final   • Urine Appearance 01/04/2024 Clear  Clear Final   • Leukocyte Esterase 01/04/2024 Negative  Negative Final   • Protein 01/04/2024 Negative  Negative/Trace Final   • Glucose, 24 HR Urine 01/04/2024 Negative  Negative Final   • Ketone, Urine 01/04/2024 2+ (A)  Negative Final   • Blood, Urine 01/04/2024 Negative  Negative Final   • Bilirubin, Urine 01/04/2024 Positive (A)  Negative Final    Positive results have been confirmed.   • Urobilinogen Urine 01/04/2024 0.2  0.2 - 1.0 mg/dL Final   • Nitrites Urine 01/04/2024 Negative  Negative Final   • Microscopic Examination 01/04/2024 Comment   Final    Microscopic follows if indicated.   • Microscopic Examination 01/04/2024 See below:   Final    Microscopic was indicated and was performed.   • SL AMB WBC, URINE 01/04/2024 None seen  0 - 5 /hpf Final   • RBC, Urine 01/04/2024 0-2  0 - 2 /hpf Final   • Epithelial Cells (non renal) 01/04/2024 None seen  0 - 10 /hpf Final   • Casts 01/04/2024 None seen  None seen /lpf Final   • Bacteria, Urine 01/04/2024 None seen  None seen/Few Final   • Cholesterol, Total 01/04/2024 143  100 - 199 mg/dL Final   • Triglycerides 01/04/2024 114  0 - 149 mg/dL Final   • HDL 01/04/2024 63  >39 mg/dL Final   • LDL Calculated 01/04/2024 60  0 - 99 mg/dL Final   • Creatinine, Urine 01/04/2024 262.6  Not Estab. mg/dL Final   • Albumin,U,Random 01/04/2024 11.6  Not Estab. ug/mL Final   • Microalb/Creat Ratio 01/04/2024 4  0 - 29 mg/g creat Final    Comment:                        Normal:                0 -  29                         Moderately increased: 30 - 300                         Severely increased:       >300     • TESTOSTERONE TOTAL 01/04/2024 206 (L)  264 - 916 ng/dL Final    Comment: Adult male reference  interval is based on a population of  healthy nonobese males (BMI <30) between 19 and 39 years old.  Luc, et.al. JCEM 2017,102;8603-7708. PMID: [id number].     • Testosterone, Free 01/04/2024 7.7  7.2 - 24.0 pg/mL Final   • Hemoglobin A1C 01/04/2024 5.9 (H)  4.8 - 5.6 % Final    Comment:          Prediabetes: 5.7 - 6.4           Diabetes: >6.4           Glycemic control for adults with diabetes: <7.0     • 25-HYDROXY VIT D 01/04/2024 36.5  30.0 - 100.0 ng/mL Final    Comment: Vitamin D deficiency has been defined by the [place] [employer] and an Endocrine Society practice guideline as a  level of serum 25-OH vitamin D less than 20 ng/mL (1,2).  The Endocrine Society went on to further define vitamin D  insufficiency as a level between 21 and 29 ng/mL (2).  1. IOM ([place] [employer]). 2010. Dietary reference     intakes for calcium and D. [place]: The     National Academies Press.  2. Vishnu MF, Nitin BOYCE, Mykel KIRAN, et al.     Evaluation, treatment, and prevention of vitamin D     deficiency: an Endocrine Society clinical practice     guideline. JCEM. 2011 Jul; 96(7):1911-30.     • Methylmalonic Acid, S 01/04/2024 119  0 - 378 nmol/L Final   • Interpretation 01/04/2024 Note   Final    Supplemental report is available.         Patient Instructions   Obesity   AMBULATORY CARE:   Obesity  means your body mass index (BMI) is greater than 30. Your healthcare provider will use your age, height, and weight to measure your BMI.  The risks of obesity include  many health problems, including injuries or physical disability.  Diabetes (high blood sugar level)    High blood pressure or high cholesterol    Heart disease or heart failure    Stroke    Gallbladder or liver disease    Cancer of the colon, breast, prostate, liver, or kidney    Sleep apnea    Arthritis or gout    Screening  is done to check for health conditions before you have signs or symptoms. If you are 35 to 70 years old, your blood  sugar level may be checked every 3 years for signs of prediabetes or diabetes. Your healthcare provider will check your blood pressure at each visit. High blood pressure can lead to a stroke or other problems. Your provider may check for signs of heart disease, cancer, or other health problems.  Seek care immediately if:   You have a severe headache, confusion, or difficulty speaking.    You have weakness on one side of your body.    You have chest pain, sweating, or shortness of breath.    Call your doctor if:   You have symptoms of gallbladder or liver disease, such as pain in your upper abdomen.    You have knee or hip pain and discomfort while walking.    You have symptoms of diabetes, such as intense hunger and thirst, and frequent urination.    You have symptoms of sleep apnea, such as snoring or daytime sleepiness.    You have questions or concerns about your condition or care.    Treatment for obesity  focuses on helping you lose weight to improve your health. Even a small decrease in BMI can reduce the risk for many health problems. Your healthcare provider will help you set a weight-loss goal.  Lifestyle changes  are the first step in treating obesity. These include making healthy food choices and getting regular physical activity. Your healthcare provider may suggest a weight-loss program that involves coaching, education, and therapy.    Medicine  may help you lose weight when it is used with a healthy foods and physical activity.    Surgery  can help you lose weight if you have obesity along with other health problems. Several types of weight-loss surgery are available. Ask your healthcare provider for more information.    Tips for safe weight loss:   Set small, realistic goals.  An example of a small goal is to walk for 20 minutes 5 days a week. Anther goal is to lose 5% of your body weight.    Ask for support.  Tell friends, family members, and coworkers about your goals. Ask someone to lose weight  with you. You may also want to join a weight-loss support group.    Identify foods or triggers that may cause you to overeat.  Remove tempting high-calorie foods from your home and workplace. Place a bowl of fresh fruit on your kitchen counter. If stress causes you to eat, find other ways to cope with stress. A counselor or therapist may be able to help you.    Track your daily calories and activity.  Write down what you eat and drink. Also write down how many minutes of physical activity you do each day.     Track your weekly weight.  Weigh yourself in the morning, before you eat or drink anything but after you use the bathroom. Use the same scale, in the same place, and in similar clothing each time. Only weigh yourself 1 to 2 times each week, or as directed. You may become discouraged if you weigh yourself every day.    Eating changes:  You will need to eat 500 to 1,000 fewer calories each day than you currently eat to lose 1 to 2 pounds a week. The following changes will help you cut calories:  Eat smaller portions.  Use small plates, no larger than 9 inches in diameter. Fill your plate half full of fruits and vegetables. Measure your food using measuring cups until you know what a serving size looks like.         Eat 3 meals and 1 or 2 snacks each day.  Plan your meals in advance. Cook and eat at home most of the time. Eat slowly. Do not skip meals. Skipping meals can lead to overeating later in the day. This can make it harder for you to lose weight. Talk with a dietitian to help you make a meal plan and schedule that is right for you.    Eat fruits and vegetables at every meal.  They are low in calories and high in fiber, which makes you feel full. Do not add butter, margarine, or cream sauce to vegetables. Use herbs to season steamed vegetables.    Eat less fat and fewer fried foods.  Eat more baked or grilled chicken and fish. These protein sources are lower in calories and fat than red meat. Limit fast  "food. Dress your salads with olive oil and vinegar instead of bottled dressing.    Limit the amount of sugar you eat.  Do not drink sugary beverages. Limit alcohol.       Activity changes:  Physical activity is good for your body in many ways. It helps you burn calories and build strong muscles. It decreases stress and depression, and improves your mood. It can also help you sleep better. Talk to your healthcare provider before you begin an exercise program.  Exercise for at least 30 minutes 5 days a week.  Start slowly. Set aside time each day for physical activity that you enjoy and that is convenient for you. It is best to do both weight training and an activity that increases your heart rate, such as walking, bicycling, or swimming.            Find ways to be more active.  Do yard work and housecleaning. Walk up the stairs instead of using elevators. Spend your leisure time going to events that require walking, such as outdoor festivals or fairs. This extra physical activity can help you lose weight and keep it off.       Follow up with your doctor as directed:  You may need to meet with a dietitian. Write down your questions so you remember to ask them during your visits.  © Copyright [employer] 2023 Information is for End User's use only and may not be sold, redistributed or otherwise used for commercial purposes.  The above information is an  only. It is not intended as medical advice for individual conditions or treatments. Talk to your doctor, nurse or pharmacist before following any medical regimen to see if it is safe and effective for you.       Stuart Hartley MD        \"This note has been constructed using a voice recognition system.Therefore there may be syntax, spelling, and/or grammatical errors. Please call if you have any questions. \"  "

## 2024-07-01 ENCOUNTER — RA CDI HCC (OUTPATIENT)
Dept: OTHER | Facility: HOSPITAL | Age: 54
End: 2024-07-01

## 2024-07-01 NOTE — PROGRESS NOTES
HCC coding opportunities          Chart Reviewed number of suggestions sent to Provider: 1  E66.01     Patients Insurance        Commercial Insurance: [employer]

## 2024-08-07 ENCOUNTER — OFFICE VISIT (OUTPATIENT)
Dept: INTERNAL MEDICINE CLINIC | Facility: CLINIC | Age: 54
End: 2024-08-07
Payer: COMMERCIAL

## 2024-08-07 VITALS
SYSTOLIC BLOOD PRESSURE: 108 MMHG | BODY MASS INDEX: 36.73 KG/M2 | HEIGHT: 69 IN | WEIGHT: 248 LBS | OXYGEN SATURATION: 98 % | HEART RATE: 74 BPM | DIASTOLIC BLOOD PRESSURE: 70 MMHG

## 2024-08-07 DIAGNOSIS — I10 PRIMARY HYPERTENSION: Primary | ICD-10-CM

## 2024-08-07 DIAGNOSIS — E78.2 MIXED HYPERLIPIDEMIA: ICD-10-CM

## 2024-08-07 DIAGNOSIS — K21.9 GASTROESOPHAGEAL REFLUX DISEASE WITHOUT ESOPHAGITIS: ICD-10-CM

## 2024-08-07 PROCEDURE — 99213 OFFICE O/P EST LOW 20 MIN: CPT | Performed by: INTERNAL MEDICINE

## 2024-08-07 NOTE — ASSESSMENT & PLAN NOTE
Asymptomatic    Pressure very well-controlled    Agree and continue management medication as follows    Lisinopril 5 mg daily with a low-salt diet exercise diet to lose weight  
Last LDL reviewed normal 54    Agree and continue manage medication as follows    Exercise diet to lose weight low-fat low-cholesterol diet  
Symptoms controlled agree and continue manage medication as follows    Nexium 40 mg daily  
RN

## 2024-08-07 NOTE — PROGRESS NOTES
Dr. Hartley's Office Visit Note  24     Christopher Villela 53 y.o. male MRN: [id number]  : 1970    Assessment:     1. Primary hypertension  Assessment & Plan:  Asymptomatic    Pressure very well-controlled    Agree and continue management medication as follows    Lisinopril 5 mg daily with a low-salt diet exercise diet to lose weight  2. Gastroesophageal reflux disease without esophagitis  Assessment & Plan:  Symptoms controlled agree and continue manage medication as follows    Nexium 40 mg daily  3. Mixed hyperlipidemia  Assessment & Plan:  Last LDL reviewed normal 54    Agree and continue manage medication as follows    Exercise diet to lose weight low-fat low-cholesterol diet        Discussion Summary and Plan:  Today's care plan and medications were reviewed with patient in detail and all their questions answered to their satisfaction.    No chief complaint on file.     Subjective:  Came in follow-up chronic medical condition listed visit diagnosis denies any chest pain difficulty breathing awaiting for the hernia repair in the month of December labs done at [facility] reviewed unremarkable no chest pain difficulty breathing        The following portions of the patient's history were reviewed and updated as appropriate: allergies, current medications, past family history, past medical history, past social history, past surgical history and problem list.    Review of Systems   Constitutional:  Negative for activity change, appetite change, chills, diaphoresis, fatigue, fever and unexpected weight change.   HENT:  Negative for congestion, dental problem, drooling, ear discharge, ear pain, facial swelling, hearing loss, mouth sores, nosebleeds, postnasal drip, rhinorrhea, sinus pressure, sneezing, sore throat, tinnitus, trouble swallowing and voice change.    Eyes:  Negative for photophobia, pain, discharge, redness, itching and visual disturbance.   Respiratory:  Negative for apnea, cough, choking, chest  tightness, shortness of breath, wheezing and stridor.    Cardiovascular:  Negative for chest pain, palpitations and leg swelling.   Gastrointestinal:  Negative for abdominal distention, abdominal pain, anal bleeding, blood in stool, constipation, diarrhea, nausea, rectal pain and vomiting.   Endocrine: Negative for cold intolerance, heat intolerance, polydipsia, polyphagia and polyuria.   Genitourinary:  Negative for decreased urine volume, difficulty urinating, dysuria, enuresis, flank pain, frequency, genital sores, hematuria and urgency.   Musculoskeletal:  Negative for arthralgias, back pain, gait problem, joint swelling, myalgias, neck pain and neck stiffness.   Skin:  Negative for color change, pallor, rash and wound.   Allergic/Immunologic: Negative.  Negative for environmental allergies, food allergies and immunocompromised state.   Neurological:  Negative for dizziness, tremors, seizures, syncope, facial asymmetry, speech difficulty, weakness, light-headedness, numbness and headaches.   Psychiatric/Behavioral:  Negative for agitation, behavioral problems, confusion, decreased concentration, dysphoric mood, hallucinations, self-injury, sleep disturbance and suicidal ideas. The patient is not nervous/anxious and is not hyperactive.          Historical Information   Patient Active Problem List   Diagnosis   • Sensorineural hearing loss (SNHL) of both ears   • Abnormal LFTs   • BPH (benign prostatic hyperplasia)   • Psoriatic arthritis (HCC)   • GERD (gastroesophageal reflux disease)   • Mixed hyperlipidemia   • Complete tear of lateral collateral ligament of right elbow   • Post-operative state   • Sprain of right elbow   • S/P musculoskeletal system surgery   • Hypogonadism in male   • Primary hypertension   • Morbid obesity (HCC)   • Hyperprolactinemia ([facility])   • Right lower quadrant abdominal mass   • Right flank pain   • Omphalitis     Past Medical History:   Diagnosis Date   • GERD (gastroesophageal reflux  disease)    • Hyperlipidemia    • Hypertension    • Psoriatic arthritis (HCC)      Past Surgical History:   Procedure Laterality Date   • COLLATERAL LIGAMENT REPAIR, ELBOW Right 7/7/2023    Procedure: LATERAL COLLATERAL LIGAMENT REPAIR WITH INTERNAL BRACE AUGMENTATION;  Surgeon: Tony Barraza;  Location:  MAIN OR;  Service: Orthopedics   • HIP SURGERY Bilateral      Social History     Substance and Sexual Activity   Alcohol Use Yes    Comment: weekly     Social History     Substance and Sexual Activity   Drug Use No     Social History     Tobacco Use   Smoking Status Never   • Passive exposure: Past   Smokeless Tobacco Never     Family History   Problem Relation Age of Onset   • No Known Problems Mother    • No Known Problems Father    • No Known Problems Sister    • No Known Problems Brother    • No Known Problems Daughter    • No Known Problems Maternal Aunt    • No Known Problems Maternal Uncle    • No Known Problems Paternal Aunt    • No Known Problems Paternal Uncle    • No Known Problems Maternal Grandmother    • No Known Problems Maternal Grandfather    • No Known Problems Paternal Grandmother    • No Known Problems Paternal Grandfather    • No Known Problems Cousin    • Alcohol abuse Neg Hx    • Arthritis Neg Hx    • Dementia Neg Hx    • Cancer Neg Hx    • COPD Neg Hx    • Depression Neg Hx    • Lupus Neg Hx    • Drug abuse Neg Hx    • Early death Neg Hx    • Hearing loss Neg Hx    • Hyperlipidemia Neg Hx    • Learning disabilities Neg Hx    • Substance Abuse Neg Hx    • Stroke Neg Hx    • Vision loss Neg Hx    • Pulmonary embolism Neg Hx    • Deep vein thrombosis Neg Hx    • Mental illness Neg Hx    • Crohn's disease Neg Hx    • Rheum arthritis Neg Hx    • Psoriasis Neg Hx    • Asthma Neg Hx      Health Maintenance Due   Topic   • COVID-19 Vaccine (1)   • Pneumococcal Vaccine: Pediatrics (0 to 5 Years) and At-Risk Patients (6 to 64 Years) (1 of 2 - PCV)   • HIV Screening    • Zoster Vaccine (1 of 2)   •  "Annual Physical    • Influenza Vaccine (1)      Meds/Allergies       Current Outpatient Medications:   •  esomeprazole (NexIUM) 40 MG capsule, Take 1 capsule (40 mg total) by mouth in the morning, Disp: 90 capsule, Rfl: 2  •  etanercept (Enbrel) 50 mg/mL SOSY, Inject 1 mL (50 mg) under the skin once a week, Disp: 4 mL, Rfl: 5  •  Evolocumab (Repatha SureClick) 140 MG/ML SOAJ, Inject 140 mg under the skin every 14 (fourteen) days, Disp: , Rfl:   •  folic acid (FOLVITE) 1 mg tablet, Take 1 mg by mouth daily, Disp: , Rfl:   •  lisinopril (ZESTRIL) 5 mg tablet, Take 5 mg by mouth daily, Disp: , Rfl:   •  Testosterone 12.5 MG/ACT (1%) GEL, , Disp: , Rfl:       Objective:    Vitals:   /70   Pulse 74   Ht 5' 9\" (1.753 m)   Wt 112 kg (248 lb)   SpO2 98%   BMI 36.62 kg/m²   Body mass index is 36.62 kg/m².  Vitals:    08/07/24 0947   Weight: 112 kg (248 lb)       Physical Exam  Vitals and nursing note reviewed.   Constitutional:       General: He is not in acute distress.     Appearance: He is well-developed. He is obese. He is not ill-appearing, toxic-appearing or diaphoretic.   HENT:      Head: Normocephalic and atraumatic.      Right Ear: External ear normal.      Left Ear: External ear normal.      Nose: Nose normal.      Mouth/Throat:      Pharynx: No oropharyngeal exudate.   Eyes:      General: Lids are normal. Lids are everted, no foreign bodies appreciated. No scleral icterus.        Right eye: No discharge.         Left eye: No discharge.      Conjunctiva/sclera: Conjunctivae normal.      Pupils: Pupils are equal, round, and reactive to light.   Neck:      Thyroid: No thyromegaly.      Vascular: Normal carotid pulses. No carotid bruit, hepatojugular reflux or JVD.      Trachea: No tracheal tenderness or tracheal deviation.   Cardiovascular:      Rate and Rhythm: Normal rate and regular rhythm.      Pulses: Normal pulses.      Heart sounds: Normal heart sounds. No murmur heard.     No friction rub. No " gallop.   Pulmonary:      Effort: Pulmonary effort is normal. No respiratory distress.      Breath sounds: Normal breath sounds. No stridor. No wheezing or rales.   Chest:      Chest wall: No tenderness.   Abdominal:      General: Bowel sounds are normal. There is no distension.      Palpations: Abdomen is soft. There is no mass.      Tenderness: There is no abdominal tenderness. There is no guarding or rebound.   Musculoskeletal:         General: No tenderness or deformity. Normal range of motion.      Cervical back: Normal range of motion and neck supple. No edema, erythema or rigidity. No spinous process tenderness or muscular tenderness. Normal range of motion.   Lymphadenopathy:      Head:      Right side of head: No submental, submandibular, tonsillar, preauricular or posterior auricular adenopathy.      Left side of head: No submental, submandibular, tonsillar, preauricular, posterior auricular or occipital adenopathy.      Cervical: No cervical adenopathy.      Right cervical: No superficial, deep or posterior cervical adenopathy.     Left cervical: No superficial, deep or posterior cervical adenopathy.      Upper Body:      Right upper body: No pectoral adenopathy.      Left upper body: No pectoral adenopathy.   Skin:     General: Skin is warm and dry.      Coloration: Skin is not pale.      Findings: No erythema or rash.   Neurological:      Mental Status: He is alert and oriented to person, place, and time.      Cranial Nerves: No cranial nerve deficit.      Sensory: No sensory deficit.      Motor: No tremor, abnormal muscle tone or seizure activity.      Coordination: Coordination normal.      Gait: Gait normal.      Deep Tendon Reflexes: Reflexes are normal and symmetric. Reflexes normal.   Psychiatric:         Behavior: Behavior normal.         Thought Content: Thought content normal.         Judgment: Judgment normal.         Lab Review   No visits with results within 2 Month(s) from this visit.    Latest known visit with results is:   Orders Only on 01/04/2024   Component Date Value Ref Range Status   • White Blood Cell Count 01/04/2024 6.1  3.4 - 10.8 x10E3/uL Final   • Red Blood Cell Count 01/04/2024 5.15  4.14 - 5.80 x10E6/uL Final   • Hemoglobin 01/04/2024 15.2  13.0 - 17.7 g/dL Final   • HCT 01/04/2024 46.3  37.5 - 51.0 % Final   • MCV 01/04/2024 90  79 - 97 fL Final   • MCH 01/04/2024 29.5  26.6 - 33.0 pg Final   • MCHC 01/04/2024 32.8  31.5 - 35.7 g/dL Final   • RDW 01/04/2024 13.0  11.6 - 15.4 % Final   • Platelet Count 01/04/2024 225  150 - 450 x10E3/uL Final   • Neutrophils 01/04/2024 50  Not Estab. % Final   • Lymphocytes 01/04/2024 35  Not Estab. % Final   • Monocytes 01/04/2024 12  Not Estab. % Final   • Eosinophils 01/04/2024 3  Not Estab. % Final   • Basophils PCT 01/04/2024 0  Not Estab. % Final   • Neutrophils (Absolute) 01/04/2024 3.0  1.4 - 7.0 x10E3/uL Final   • Lymphocytes (Absolute) 01/04/2024 2.1  0.7 - 3.1 x10E3/uL Final   • Monocytes (Absolute) 01/04/2024 0.7  0.1 - 0.9 x10E3/uL Final   • Eosinophils (Absolute) 01/04/2024 0.2  0.0 - 0.4 x10E3/uL Final   • Basophils ABS 01/04/2024 0.0  0.0 - 0.2 x10E3/uL Final   • Immature Granulocytes 01/04/2024 0  Not Estab. % Final   • Immature Granulocytes (Absolute) 01/04/2024 0.0  0.0 - 0.1 x10E3/uL Final    Comment: A hand-written panel/profile was received from your office. In  accordance with the [facility] Ambiguous Test Code Policy dated July 2003, we have assigned CBC with Differential/Platelet, Test Code  #[id number] to this request. If this is not the testing you wished to  receive on this specimen, please contact the [facility] Client Inquiry/  Technical Services Department to clarify the test order. We  appreciate your business.     • Glucose, Random 01/04/2024 85  70 - 99 mg/dL Final   • BUN 01/04/2024 11  6 - 24 mg/dL Final   • Creatinine 01/04/2024 0.97  0.76 - 1.27 mg/dL Final   • eGFR 01/04/2024 92  >59 mL/min/1.73 Final   • SL AMB  BUN/CREATININE RATIO 01/04/2024 11  9 - 20 Final   • Sodium 01/04/2024 137  134 - 144 mmol/L Final   • Potassium 01/04/2024 4.4  3.5 - 5.2 mmol/L Final   • Chloride 01/04/2024 98  96 - 106 mmol/L Final   • CO2 01/04/2024 22  20 - 29 mmol/L Final   • CALCIUM 01/04/2024 9.9  8.7 - 10.2 mg/dL Final   • Protein, Total 01/04/2024 7.7  6.0 - 8.5 g/dL Final   • Albumin 01/04/2024 4.9  3.8 - 4.9 g/dL Final   • Globulin, Total 01/04/2024 2.8  1.5 - 4.5 g/dL Final   • Albumin/Globulin Ratio 01/04/2024 1.8  1.2 - 2.2 Final   • TOTAL BILIRUBIN 01/04/2024 0.7  0.0 - 1.2 mg/dL Final   • Alk Phos Isoenzymes 01/04/2024 49  44 - 121 IU/L Final   • AST 01/04/2024 44 (H)  0 - 40 IU/L Final   • ALT 01/04/2024 47 (H)  0 - 44 IU/L Final   • Specific Gravity 01/04/2024 1.024  1.005 - 1.030 Final   • Ph 01/04/2024 6.0  5.0 - 7.5 Final   • Color UA 01/04/2024 Yellow  Yellow Final   • Urine Appearance 01/04/2024 Clear  Clear Final   • Leukocyte Esterase 01/04/2024 Negative  Negative Final   • Protein 01/04/2024 Negative  Negative/Trace Final   • Glucose, 24 HR Urine 01/04/2024 Negative  Negative Final   • Ketone, Urine 01/04/2024 2+ (A)  Negative Final   • Blood, Urine 01/04/2024 Negative  Negative Final   • Bilirubin, Urine 01/04/2024 Positive (A)  Negative Final    Positive results have been confirmed.   • Urobilinogen Urine 01/04/2024 0.2  0.2 - 1.0 mg/dL Final   • Nitrites Urine 01/04/2024 Negative  Negative Final   • Microscopic Examination 01/04/2024 Comment   Final    Microscopic follows if indicated.   • Microscopic Examination 01/04/2024 See below:   Final    Microscopic was indicated and was performed.   • SL AMB WBC, URINE 01/04/2024 None seen  0 - 5 /hpf Final   • RBC, Urine 01/04/2024 0-2  0 - 2 /hpf Final   • Epithelial Cells (non renal) 01/04/2024 None seen  0 - 10 /hpf Final   • Casts 01/04/2024 None seen  None seen /lpf Final   • Bacteria, Urine 01/04/2024 None seen  None seen/Few Final   • Cholesterol, Total 01/04/2024 143   100 - 199 mg/dL Final   • Triglycerides 01/04/2024 114  0 - 149 mg/dL Final   • HDL 01/04/2024 63  >39 mg/dL Final   • LDL Calculated 01/04/2024 60  0 - 99 mg/dL Final   • Creatinine, Urine 01/04/2024 262.6  Not Estab. mg/dL Final   • Albumin,U,Random 01/04/2024 11.6  Not Estab. ug/mL Final   • Microalb/Creat Ratio 01/04/2024 4  0 - 29 mg/g creat Final    Comment:                        Normal:                0 -  29                         Moderately increased: 30 - 300                         Severely increased:       >300     • TESTOSTERONE TOTAL 01/04/2024 206 (L)  264 - 916 ng/dL Final    Comment: Adult male reference interval is based on a population of  healthy nonobese males (BMI <30) between 19 and 39 years old.  Luc et.al. JCEM 2017,102;3461-9708. PMID: [id number].     • Testosterone, Free 01/04/2024 7.7  7.2 - 24.0 pg/mL Final   • Hemoglobin A1C 01/04/2024 5.9 (H)  4.8 - 5.6 % Final    Comment:          Prediabetes: 5.7 - 6.4           Diabetes: >6.4           Glycemic control for adults with diabetes: <7.0     • 25-HYDROXY VIT D 01/04/2024 36.5  30.0 - 100.0 ng/mL Final    Comment: Vitamin D deficiency has been defined by the [employer] of  Medicine and an Endocrine Society practice guideline as a  level of serum 25-OH vitamin D less than 20 ng/mL (1,2).  The Endocrine Society went on to further define vitamin D  insufficiency as a level between 21 and 29 ng/mL (2).  1. IOM ([employer] of Medicine). 2010. Dietary reference     intakes for calcium and D. [place]: The     National Academies Press.  2. Vishnu MF, Nitin NC, Mykel KIRAN, et al.     Evaluation, treatment, and prevention of vitamin D     deficiency: an Endocrine Society clinical practice     guideline. JCEM. 2011 Jul; 96(7):1911-30.     • Methylmalonic Acid, S 01/04/2024 119  0 - 378 nmol/L Final   • Interpretation 01/04/2024 Note   Final    Supplemental report is available.         Patient Instructions   Pt is symptom free  "for this problem.  This diagnosis or problem is stable/well controlled  Patient is advised to continue same meds as outlined in medicine list          Stuart Hartley MD        \"This note has been constructed using a voice recognition system.Therefore there may be syntax, spelling, and/or grammatical errors. Please call if you have any questions. \"  "

## 2024-09-11 ENCOUNTER — NURSE TRIAGE (OUTPATIENT)
Age: 54
End: 2024-09-11

## 2024-09-11 NOTE — TELEPHONE ENCOUNTER
Regarding: pink eye; red, crusty, itchy eyes  ----- Message from Yana PALACIOS sent at 9/11/2024  1:01 PM EDT -----  Pink eye in both eyes. Symptoms started approx 2 days ago  Itchy, crusty eyes    Call back # [phone]

## 2024-09-11 NOTE — TELEPHONE ENCOUNTER
Pt contacted.  Pt advised he must be evaluated in office - pt declined appt.  Advised to go to UC.  Pt agreeable.  No further questions at this time.

## 2024-09-11 NOTE — TELEPHONE ENCOUNTER
Pt called back in for an update. Please have provider in office advise. Pt states that he cannot leave work do you scheduling issues. Advised UC and pt states that they are closed before he leave work. Please advise.

## 2024-09-11 NOTE — TELEPHONE ENCOUNTER
"Called patient . 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 Unable to obtain most questions in assessment questions.  States he can't come in for an appt. 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 Offered today and tomorrow but declined due to work schedule. Asking for medication, uses [facility]  Reason for Disposition   Eye with yellow/green discharge or eyelashes stick together, but NO standing order to call in antibiotic eye drops    Answer Assessment - Initial Assessment Questions  1. EYE DISCHARGE: \"Is the discharge in one or both eyes?\" \"What color is it?\" \"How much is there?\" \"When did the discharge start?\"       both  2. REDNESS OF SCLERA: \"Is the redness in one or both eyes?\" \"When did the redness start?\"       yes  3. EYELIDS: \"Are the eyelids red or swollen?\" If Yes, ask: \"How much?\"       N/a  4. VISION: \"Is there any difficulty seeing clearly?\"       N/a  5. PAIN: \"Is there any pain? If Yes, ask: \"How bad is it?\" (Scale 1-10; or mild, moderate, severe)     - MILD (1-3): doesn't interfere with normal activities      - MODERATE (4-7): interferes with normal activities or awakens from sleep     - SEVERE (8-10): excruciating pain, unable to do any normal activities        N/a  6. CONTACT LENS: \"Do you wear contacts?\"      unknown  7. OTHER SYMPTOMS: \"Do you have any other symptoms?\" (e.g., fever, runny nose, cough)      unknown  8. PREGNANCY: \"Is there any chance you are pregnant?\" \"When was your last menstrual period?\"      N/a    Protocols used: Eye - Pus or Discharge-ADULT-[place]    "

## 2024-11-11 PROBLEM — M15.0 PRIMARY GENERALIZED (OSTEO)ARTHRITIS: Status: ACTIVE | Noted: 2024-11-11

## 2024-11-11 PROBLEM — L40.9 PSORIASIS: Status: ACTIVE | Noted: 2024-11-11

## 2024-11-11 PROBLEM — Z79.60 LONG-TERM USE OF IMMUNOSUPPRESSANT MEDICATION: Status: ACTIVE | Noted: 2024-11-11

## 2024-11-12 ENCOUNTER — TELEMEDICINE (OUTPATIENT)
Dept: RHEUMATOLOGY | Facility: CLINIC | Age: 54
End: 2024-11-12
Payer: COMMERCIAL

## 2024-11-12 ENCOUNTER — TELEPHONE (OUTPATIENT)
Age: 54
End: 2024-11-12

## 2024-11-12 ENCOUNTER — TELEPHONE (OUTPATIENT)
Dept: RHEUMATOLOGY | Facility: CLINIC | Age: 54
End: 2024-11-12

## 2024-11-12 DIAGNOSIS — Z11.1 SCREENING-PULMONARY TB: ICD-10-CM

## 2024-11-12 DIAGNOSIS — Z79.60 LONG-TERM USE OF IMMUNOSUPPRESSANT MEDICATION: ICD-10-CM

## 2024-11-12 DIAGNOSIS — M15.0 PRIMARY GENERALIZED (OSTEO)ARTHRITIS: ICD-10-CM

## 2024-11-12 DIAGNOSIS — L40.9 PSORIASIS: ICD-10-CM

## 2024-11-12 DIAGNOSIS — L40.50 PSORIATIC ARTHRITIS (HCC): Primary | ICD-10-CM

## 2024-11-12 PROCEDURE — 99214 OFFICE O/P EST MOD 30 MIN: CPT | Performed by: INTERNAL MEDICINE

## 2024-11-12 NOTE — PROGRESS NOTES
Refill approved as requested.   Virtual Regular Visit  Name: Christopher Villela      : 1970      MRN: [id number]  Encounter Provider: Emerald Tong MD  Encounter Date: 2024   Encounter department: [facility] [place]    Verification of patient location:    Patient is located at Home in the following state in which I hold an active license [place]    Assessment & Plan  Psoriatic arthritis (HCC)  Mr. Villela is a 53-year-old male with history significant for psoriasis and psoriatic arthritis who presents for a follow up.  He is currently on subcutaneous Enbrel 50 mg weekly.     # Psoriatic arthritis and psoriasis  - Christopher presents today for a follow-up of psoriatic arthritis and psoriasis which is currently managed with subcutaneous Enbrel 50 mg once weekly and meloxicam 15 mg daily as needed which he takes very sparingly.  He seems to be stable overall without any significant complaints and as a result I recommend continuing the same regimen unchanged.  He will update high-risk medication lab monitoring to assess for drug toxicities.     Orders:    C-reactive protein; Future    Sedimentation rate, automated; Future    C-reactive protein    Sedimentation rate, automated      Psoriasis    Orders:    C-reactive protein; Future    Sedimentation rate, automated; Future    C-reactive protein    Sedimentation rate, automated      Long-term use of immunosuppressant medication    Orders:    CBC and differential; Future    Comprehensive metabolic panel; Future    CBC and differential    Comprehensive metabolic panel      Primary generalized (osteo)arthritis           Screening-pulmonary TB    Orders:    QuantiFERON-TB Gold Plus [facility]; Future    QuantiFERON-TB Gold Plus [facility]        Patient's rheumatologic disease(s) threaten long-term function if not appropriately managed.      Encounter provider Emerald Tong MD    The patient was identified by name and date of birth. Christopher Villela was informed that this is a telemedicine  visit and that the visit is being conducted through the Epic Embedded platform. He agrees to proceed..  My office door was closed. No one else was in the room.  He acknowledged consent and understanding of privacy and security of the video platform. The patient has agreed to participate and understands they can discontinue the visit at any time.    Patient is aware this is a billable service.     History of Present Illness     INITIAL VISIT NOTE:  Mr. Villela is a 48-year-old male with history significant for psoriatic arthritis and obesity, who presents for further management of psoriatic arthritis.  He is transitioning care from Dr. Pro to [facility].  He is currently on subcutaneous Enbrel 50 mg weekly.     Patient states he was diagnosed with psoriatic arthritis in the mid 1990s, when he presented with severe pain and swelling of his feet.  He states the symptoms progressed rapidly over time to the point where he was using crutches for ambulating.  He was seen by multiple specialists including Podiatry and Orthopedics, and treated for plantar fasciitis without any relief.  He also underwent an EMG/NCS study of his lower extremities which was unrevealing.  Eventually he was referred to a rheumatologist and immediately diagnosed with psoriatic arthritis.  He states initially he was started on an oral DMARD but cannot recall the name.  He states it made him very sun sensitive so it was discontinued.  Following this he was started on subcutaneous Enbrel 50 mg weekly which he has been maintained on since then.  He has been tolerating the Enbrel well without any noticeable side effects and there have been no concerns for recurrent infections.  He states briefly he was switched from Enbrel to Humira for a short period of time, but for various reasons he disliked the Humira and was eventually transitioned back to Enbrel.  He states the change was made due to concerns for continued inflammation at a  few finger joints.     Currently he denies any joint pains, and states he does not experience any frequent flare-ups.  On occasion the only joints that do bother him is his left hand thumb, index and middle fingers.  He states the pain resolves spontaneously.  Recently he did have an episode of right knee swelling after prolonged activities and was seen by his orthopedic doctor who thought it was related to the activities he was doing.  He was administered an intra-articular cortisone injection which helped him.  Since then he has not experienced any joint swelling.  He does experience morning stiffness which affects him diffusely but more commonly affects his back, which he states lasts a few minutes.  There has been no requirement for him to take any over-the-counter pain medications.  He has also not had any significant flare ups in the psoriasis.  He denies any other complaints at today's visit.        4/16/2019:  Patient presents for follow up of psoriatic arthritis.  He is currently on subcutaneous Enbrel 50 mg weekly.  We reviewed the tests done following his prior office visit which showed an unremarkable CBC, CMP, ESR, chronic hepatitis panel, QuantiFERON, rheumatoid factor, anti-CCP antibody and uric acid.  CRP was mildly elevated at 5.9.  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      He has been tolerating the Enbrel well without any concerns for side effects or recurrent infections.  He denies fevers, weight loss, new skin rash or mouth/nose ulcers.  He has a chronic itchy skin rash on his right lower leg which he states even though is present, seems to be better controlled on the Enbrel.  This has previously been attributed to psoriasis.  No other skin rashes noted.     His joint pains have overall been well  controlled, although intermittently he will experience pain affecting his hands (predominantly the left thumb) and more recently his right elbow.  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  The symptoms do not occur everyday.  No other significant joint pains noted.  No swelling noted.  He reports morning stiffness which affects him diffusely and lasts a few minutes.         10/3/2019:  Patient presents for a follow-up of psoriatic arthritis.  He is currently on subcutaneous Enbrel 50 milligram weekly.     Patient reports he is currently doing well, and denies any joint pains, swelling or significant morning stiffness.  He does experience some mild diffuse stiffness when he first wakes up that improves after he takes a hot shower.  He did take the meloxicam as needed for the right elbow epicondylitis, and states that his symptoms significantly improved.  He was experiencing a flare-up of pain affecting his right knee and was seen by Orthopedics and underwent an MRI.  I do not have the reports available to me, but he was advised that there may be a very minor hairline fracture.  Surgical options were discussed with him, but patient opted not to pursue this during his work season.  Currently he is being managed with meloxicam 15 milligram daily as needed, and states that this has significantly been helping with the right knee and back pain.  He is doing well at this time.  He has been tolerating the Enbrel well without any concerns for side effects or infections.        1/27/2020:  Patient presents for a follow-up of psoriatic arthritis.  He is currently on subcutaneous Enbrel 50 mg once weekly.  I reviewed his recently done CBC and CMP which were unremarkable.     Patient reports he has not had any significant flare-up in his joint pains since the last office visit.  He continues to experience  occasional pain and swelling at his bilateral thumbs, which is not new, and is slightly improved from the last office visit.  There have been no new joint pains, swelling or stiffness.  Recently he has been experiencing periodic flare-ups of neck pain associated with stiffness.  He is known to have mild degenerative arthritis of his cervical spine.  He was seen by his primary care physician and prescribed a Medrol Dosepak which did help.  He mentions he will continue with conservative measures at home to manage his neck pain, and contact the office if he would like repeat evaluation with neck imaging or referral to the [facility].     He has been tolerating the Enbrel well without any concerns for side effects or recurrent infections.  No new complaints noted today.        1/21/2021:  Patient presents for a follow-up of psoriatic arthritis.  He is currently on subcutaneous Enbrel 50 mg once weekly.  I reviewed his recently done CBC, CMP, ESR and CRP which were unremarkable.  The lipid panel showed a mildly elevated LDL of 138.     He reports over the past few months he feels like his joint pains have been flaring up.  He particularly experienced symptoms affecting his bilateral knees for which he followed with Orthopedics.  He had intra-articular cortisone injections administered which did not help.  He completed a series of viscosupplementation which has been helping.  There are plans to delay surgery as much as possible for the knee osteoarthritis.  He reports intermittent pain that still affect his left thumb.  He was also seeing Podiatry for pain on the anterior and lateral aspect of his right foot with concerns for a stress fracture.  He reports occasional pain that also affect his bilateral shoulders.  He denies any joint pains throughout his hands otherwise, wrists, elbows, hips, ankles or left foot.  He denies swollen joints.  He does experience morning stiffness which affects him diffusely and takes about  1 hour to improve.  He has been utilizing the meloxicam very sparingly and has not been on any recent steroids.  He reports the psoriasis has been stable but occasionally the lesion on his right lower leg may flare-up and he may notice small patches on other areas of his body.     He has been tolerating the Enbrel well without any concerns for side effects or infections.        2/4/2022:  Patient presents for a follow-up of psoriatic arthritis and psoriasis.  He is currently on subcutaneous Enbrel 50 mg once weekly.     He reports overall he has been stable without any significant flare ups.  He still feels stiff in the morning and states on occasion this can last more than 1 hour.  No worsening joint pains or swelling.  He has had a flare up in the psoriasis.       He has been tolerating the Enbrel well without any concerns for side effects or infections.        10/27/2022:  Patient presents for a follow-up of psoriatic arthritis and psoriasis.  He is currently on subcutaneous Enbrel 50 mg once weekly.     He reports overall he has been stable without any significant flare ups.  He still feels stiff in the morning and states on occasion this can last more than 1 hour.  No worsening joint pains or swelling.  He takes meloxicam 15 mg once a day as needed which helps him and this has been infrequently.  He has a psoriasis patch on his leg but no new patches.     He has been tolerating the Enbrel well without any concerns for side effects or infections.        10/30/2023:  Patient presents for a follow-up of psoriatic arthritis and psoriasis.  He is currently on subcutaneous Enbrel 50 mg once weekly.  I reviewed his labs from January which showed an unremarkable CBC and stable CMP with slight elevation in his liver enzymes.  A C-reactive protein was normal.  An ESR was slightly elevated at 43.     He reports overall he has been stable without any significant flare ups.  He still feels stiff in the morning and states on  occasion this can last more than 1 hour.  No worsening joint pains or swelling.  He takes meloxicam 15 mg once a day as needed which helps him and this has been infrequently.  He gets occasional psoriasis patches but nothing that is consistent.  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      He has been tolerating the Enbrel well without any concerns for side effects or infections.      11/12/2024:  Patient presents for a follow-up of psoriatic arthritis and psoriasis.  He is currently on subcutaneous Enbrel 50 mg once weekly.  I reviewed his labs done in January which showed an unremarkable CBC, CMP, ESR, CRP and lipid panel.    He reports overall he has been stable without any significant flare ups.  He still feels stiff in the morning and states on occasion this can last more than 1 hour.  No concerning joint pains or swelling.  He takes meloxicam 15 mg once a day as needed which helps him and this has been infrequently.  He gets occasional psoriasis patches but nothing that is consistent.       He has been tolerating the Enbrel well without any concerns for side effects or infections.        Review of Systems  Constitutional: Negative for weight change, fevers, chills, night sweats, fatigue.  ENT/Mouth: Negative for hearing changes, ear pain, nasal congestion, sinus pain, hoarseness, sore throat, rhinorrhea, swallowing difficulty.   Eyes: Negative for pain, redness, discharge, vision changes.   Cardiovascular: Negative for chest pain, SOB, palpitations.   Respiratory: Negative for cough, sputum, wheezing, dyspnea.   Gastrointestinal: Negative for nausea, vomiting, diarrhea, constipation, pain, heartburn.  Genitourinary: Negative for dysuria, urinary frequency, hematuria.   Musculoskeletal: As per HPI.  Skin: Negative for skin rash currently, color changes.   Neuro: Negative for weakness, numbness, tingling, loss of  consciousness.   Psych: Negative for anxiety, depression.   Heme/Lymph: Negative for easy bruising, bleeding, lymphadenopathy.      Past Medical History   Past Medical History:   Diagnosis Date    GERD (gastroesophageal reflux disease)     Hyperlipidemia     Hypertension     Psoriatic arthritis (HCC)      Past Surgical History:   Procedure Laterality Date    COLLATERAL LIGAMENT REPAIR, ELBOW Right 7/7/2023    Procedure: LATERAL COLLATERAL LIGAMENT REPAIR WITH INTERNAL BRACE AUGMENTATION;  Surgeon: Tony Barraza;  Location:  MAIN OR;  Service: Orthopedics    HIP SURGERY Bilateral      Family History   Problem Relation Age of Onset    No Known Problems Mother     No Known Problems Father     No Known Problems Sister     No Known Problems Brother     No Known Problems Daughter     No Known Problems Maternal Aunt     No Known Problems Maternal Uncle     No Known Problems Paternal Aunt     No Known Problems Paternal Uncle     No Known Problems Maternal Grandmother     No Known Problems Maternal Grandfather     No Known Problems Paternal Grandmother     No Known Problems Paternal Grandfather     No Known Problems Cousin     Alcohol abuse Neg Hx     Arthritis Neg Hx     Dementia Neg Hx     Cancer Neg Hx     COPD Neg Hx     Depression Neg Hx     Lupus Neg Hx     Drug abuse Neg Hx     Early death Neg Hx     Hearing loss Neg Hx     Hyperlipidemia Neg Hx     Learning disabilities Neg Hx     Substance Abuse Neg Hx     Stroke Neg Hx     Vision loss Neg Hx     Pulmonary embolism Neg Hx     Deep vein thrombosis Neg Hx     Mental illness Neg Hx     Crohn's disease Neg Hx     Rheum arthritis Neg Hx     Psoriasis Neg Hx     Asthma Neg Hx      Current Outpatient Medications on File Prior to Visit   Medication Sig Dispense Refill    esomeprazole (NexIUM) 40 MG capsule Take 1 capsule (40 mg total) by mouth in the morning 90 capsule 2    etanercept (Enbrel) 50 mg/mL SOSY Inject 1 mL (50 mg) under the skin once a week 4 mL 5     Evolocumab (Repatha SureClick) 140 MG/ML SOAJ Inject 140 mg under the skin every 14 (fourteen) days      folic acid (FOLVITE) 1 mg tablet Take 1 mg by mouth daily      lisinopril (ZESTRIL) 5 mg tablet Take 5 mg by mouth daily      Testosterone 12.5 MG/ACT (1%) GEL        No current facility-administered medications on file prior to visit.   No Known Allergies   Current Outpatient Medications on File Prior to Visit   Medication Sig Dispense Refill    esomeprazole (NexIUM) 40 MG capsule Take 1 capsule (40 mg total) by mouth in the morning 90 capsule 2    etanercept (Enbrel) 50 mg/mL SOSY Inject 1 mL (50 mg) under the skin once a week 4 mL 5    Evolocumab (Repatha SureClick) 140 MG/ML SOAJ Inject 140 mg under the skin every 14 (fourteen) days      folic acid (FOLVITE) 1 mg tablet Take 1 mg by mouth daily      lisinopril (ZESTRIL) 5 mg tablet Take 5 mg by mouth daily      Testosterone 12.5 MG/ACT (1%) GEL        No current facility-administered medications on file prior to visit.      Social History     Tobacco Use    Smoking status: Never     Passive exposure: Past    Smokeless tobacco: Never   Vaping Use    Vaping status: Never Used   Substance and Sexual Activity    Alcohol use: Yes     Comment: weekly    Drug use: No    Sexual activity: Not on file         Objective     There were no vitals taken for this visit.  Physical Exam  General: Well appearing, well nourished, in no distress. Oriented x 3, normal mood and affect.  Ambulating without difficulty.  Skin: Good turgor, no rash, unusual bruising or prominent lesions.  Hair: Normal texture and distribution.  HEENT:  Head: Normocephalic, atraumatic.  Eyes: Conjunctiva clear, sclera non-icteric, EOM intact.  Nose: No external lesions.  Neck: Supple.  Neurologic: Alert and oriented. No focal neurological deficits appreciated.   Psychiatric: Normal mood and affect.     Visit Time  Total Visit Duration: 21 minutes.

## 2024-11-12 NOTE — ASSESSMENT & PLAN NOTE
Mr. Villela is a 53-year-old male with history significant for psoriasis and psoriatic arthritis who presents for a follow up.  He is currently on subcutaneous Enbrel 50 mg weekly.     # Psoriatic arthritis and psoriasis  - Christopher presents today for a follow-up of psoriatic arthritis and psoriasis which is currently managed with subcutaneous Enbrel 50 mg once weekly and meloxicam 15 mg daily as needed which he takes very sparingly.  He seems to be stable overall without any significant complaints and as a result I recommend continuing the same regimen unchanged.  He will update high-risk medication lab monitoring to assess for drug toxicities.     Orders:    C-reactive protein; Future    Sedimentation rate, automated; Future    C-reactive protein    Sedimentation rate, automated

## 2024-11-12 NOTE — TELEPHONE ENCOUNTER
Pt calls and states that He can not seem to check in for his appt and wanted to know if we can check him in. He states he is ready for his appt.

## 2024-11-12 NOTE — TELEPHONE ENCOUNTER
Please schedule him for 1 year follow up, can be VV or in person per his preference.   detailed exam

## 2024-11-13 ENCOUNTER — TELEPHONE (OUTPATIENT)
Age: 54
End: 2024-11-13

## 2024-11-13 DIAGNOSIS — E22.1 HYPERPROLACTINEMIA (HCC): ICD-10-CM

## 2024-11-13 DIAGNOSIS — E55.9 VITAMIN D DEFICIENCY: ICD-10-CM

## 2024-11-13 DIAGNOSIS — L40.50 PSORIATIC ARTHRITIS (HCC): ICD-10-CM

## 2024-11-13 DIAGNOSIS — I10 PRIMARY HYPERTENSION: ICD-10-CM

## 2024-11-13 DIAGNOSIS — R73.02 GLUCOSE INTOLERANCE (IMPAIRED GLUCOSE TOLERANCE): ICD-10-CM

## 2024-11-13 DIAGNOSIS — E78.2 MIXED HYPERLIPIDEMIA: ICD-10-CM

## 2024-11-13 DIAGNOSIS — E29.1 HYPOGONADISM IN MALE: ICD-10-CM

## 2024-11-13 DIAGNOSIS — R79.89 ABNORMAL LFTS: Primary | ICD-10-CM

## 2024-11-13 DIAGNOSIS — Z13.0 SCREENING FOR DEFICIENCY ANEMIA: ICD-10-CM

## 2024-11-13 NOTE — TELEPHONE ENCOUNTER
Patient calling asking for complete blood work. He stated that he wants full panels for his upcoming appt with Dr. Bustamante.  Elva Maddox

## 2024-11-13 NOTE — TELEPHONE ENCOUNTER
Patient is heading over to the office now and is requesting if he can have all active bloodwork panels printed out for him when he gets there including the ones just ordered by Dr. Tong as well.

## 2024-11-14 ENCOUNTER — TELEPHONE (OUTPATIENT)
Dept: RHEUMATOLOGY | Facility: CLINIC | Age: 54
End: 2024-11-14

## 2024-11-15 ENCOUNTER — RESULTS FOLLOW-UP (OUTPATIENT)
Dept: INTERNAL MEDICINE CLINIC | Facility: CLINIC | Age: 54
End: 2024-11-15

## 2024-11-15 LAB
ALBUMIN SERPL-MCNC: 4.7 G/DL (ref 3.8–4.9)
ALP SERPL-CCNC: 49 IU/L (ref 44–121)
ALT SERPL-CCNC: 15 IU/L (ref 0–44)
APPEARANCE UR: CLEAR
AST SERPL-CCNC: 22 IU/L (ref 0–40)
BACTERIA URNS QL MICRO: NORMAL
BASOPHILS # BLD AUTO: 0 X10E3/UL (ref 0–0.2)
BASOPHILS NFR BLD AUTO: 0 %
BILIRUB SERPL-MCNC: 1 MG/DL (ref 0–1.2)
BILIRUB UR QL STRIP: NEGATIVE
BUN SERPL-MCNC: 10 MG/DL (ref 6–24)
BUN/CREAT SERPL: 10 (ref 9–20)
CALCIUM SERPL-MCNC: 9.8 MG/DL (ref 8.7–10.2)
CASTS URNS QL MICRO: NORMAL /LPF
CHLORIDE SERPL-SCNC: 96 MMOL/L (ref 96–106)
CHOLEST SERPL-MCNC: 139 MG/DL (ref 100–199)
CO2 SERPL-SCNC: 24 MMOL/L (ref 20–29)
COLOR UR: YELLOW
CREAT SERPL-MCNC: 0.98 MG/DL (ref 0.76–1.27)
EGFR: 91 ML/MIN/1.73
EOSINOPHIL # BLD AUTO: 0.1 X10E3/UL (ref 0–0.4)
EOSINOPHIL NFR BLD AUTO: 1 %
EPI CELLS #/AREA URNS HPF: NORMAL /HPF (ref 0–10)
ERYTHROCYTE [DISTWIDTH] IN BLOOD BY AUTOMATED COUNT: 13.1 % (ref 11.6–15.4)
EST. AVERAGE GLUCOSE BLD GHB EST-MCNC: 111 MG/DL
GLOBULIN SER-MCNC: 2.8 G/DL (ref 1.5–4.5)
GLUCOSE SERPL-MCNC: 78 MG/DL (ref 70–99)
GLUCOSE UR QL: NEGATIVE
HBA1C MFR BLD: 5.5 % (ref 4.8–5.6)
HCT VFR BLD AUTO: 45.6 % (ref 37.5–51)
HDLC SERPL-MCNC: 76 MG/DL
HGB BLD-MCNC: 14.9 G/DL (ref 13–17.7)
HGB UR QL STRIP: NEGATIVE
IMM GRANULOCYTES # BLD: 0 X10E3/UL (ref 0–0.1)
IMM GRANULOCYTES NFR BLD: 0 %
KETONES UR QL STRIP: ABNORMAL
LDLC SERPL CALC-MCNC: 49 MG/DL (ref 0–99)
LDLC/HDLC SERPL: 0.6 RATIO (ref 0–3.6)
LEUKOCYTE ESTERASE UR QL STRIP: ABNORMAL
LYMPHOCYTES # BLD AUTO: 1.9 X10E3/UL (ref 0.7–3.1)
LYMPHOCYTES NFR BLD AUTO: 26 %
MCH RBC QN AUTO: 30.6 PG (ref 26.6–33)
MCHC RBC AUTO-ENTMCNC: 32.7 G/DL (ref 31.5–35.7)
MCV RBC AUTO: 94 FL (ref 79–97)
MICRO URNS: ABNORMAL
MONOCYTES # BLD AUTO: 0.7 X10E3/UL (ref 0.1–0.9)
MONOCYTES NFR BLD AUTO: 9 %
NEUTROPHILS # BLD AUTO: 4.5 X10E3/UL (ref 1.4–7)
NEUTROPHILS NFR BLD AUTO: 64 %
NITRITE UR QL STRIP: NEGATIVE
PH UR STRIP: 5.5 [PH] (ref 5–7.5)
PLATELET # BLD AUTO: 244 X10E3/UL (ref 150–450)
POTASSIUM SERPL-SCNC: 4.8 MMOL/L (ref 3.5–5.2)
PROT SERPL-MCNC: 7.5 G/DL (ref 6–8.5)
PROT UR QL STRIP: NEGATIVE
RBC # BLD AUTO: 4.87 X10E6/UL (ref 4.14–5.8)
RBC #/AREA URNS HPF: NORMAL /HPF (ref 0–2)
SL AMB VLDL CHOLESTEROL CALC: 14 MG/DL (ref 5–40)
SODIUM SERPL-SCNC: 136 MMOL/L (ref 134–144)
SP GR UR: 1.01 (ref 1–1.03)
TESTOST FREE SERPL-MCNC: 10.8 PG/ML (ref 7.2–24)
TESTOST SERPL-MCNC: 344 NG/DL (ref 264–916)
TRIGL SERPL-MCNC: 72 MG/DL (ref 0–149)
UROBILINOGEN UR STRIP-ACNC: 1 MG/DL (ref 0.2–1)
WBC # BLD AUTO: 7.1 X10E3/UL (ref 3.4–10.8)
WBC #/AREA URNS HPF: NORMAL /HPF (ref 0–5)

## 2024-11-16 ENCOUNTER — RESULTS FOLLOW-UP (OUTPATIENT)
Dept: RHEUMATOLOGY | Facility: CLINIC | Age: 54
End: 2024-11-16

## 2024-11-16 LAB
ALBUMIN SERPL-MCNC: 4.7 G/DL (ref 3.8–4.9)
ALP SERPL-CCNC: 49 IU/L (ref 44–121)
ALT SERPL-CCNC: 19 IU/L (ref 0–44)
AST SERPL-CCNC: 22 IU/L (ref 0–40)
BASOPHILS # BLD AUTO: 0 X10E3/UL (ref 0–0.2)
BASOPHILS NFR BLD AUTO: 0 %
BILIRUB SERPL-MCNC: 1 MG/DL (ref 0–1.2)
BUN SERPL-MCNC: 11 MG/DL (ref 6–24)
BUN/CREAT SERPL: 12 (ref 9–20)
CALCIUM SERPL-MCNC: 9.9 MG/DL (ref 8.7–10.2)
CHLORIDE SERPL-SCNC: 96 MMOL/L (ref 96–106)
CO2 SERPL-SCNC: 22 MMOL/L (ref 20–29)
CREAT SERPL-MCNC: 0.94 MG/DL (ref 0.76–1.27)
CRP SERPL-MCNC: 2 MG/L (ref 0–10)
EGFR: 96 ML/MIN/1.73
EOSINOPHIL # BLD AUTO: 0.1 X10E3/UL (ref 0–0.4)
EOSINOPHIL NFR BLD AUTO: 1 %
ERYTHROCYTE [DISTWIDTH] IN BLOOD BY AUTOMATED COUNT: 13.1 % (ref 11.6–15.4)
ERYTHROCYTE [SEDIMENTATION RATE] IN BLOOD BY WESTERGREN METHOD: 6 MM/HR (ref 0–30)
GAMMA INTERFERON BACKGROUND BLD IA-ACNC: 0 IU/ML
GLOBULIN SER-MCNC: 3 G/DL (ref 1.5–4.5)
GLUCOSE SERPL-MCNC: 80 MG/DL (ref 70–99)
HCT VFR BLD AUTO: 46.1 % (ref 37.5–51)
HGB BLD-MCNC: 14.9 G/DL (ref 13–17.7)
IMM GRANULOCYTES # BLD: 0 X10E3/UL (ref 0–0.1)
IMM GRANULOCYTES NFR BLD: 0 %
LYMPHOCYTES # BLD AUTO: 1.9 X10E3/UL (ref 0.7–3.1)
LYMPHOCYTES NFR BLD AUTO: 27 %
M TB IFN-G CD4+ T-CELLS BLD-ACNC: 0 IU/ML
M TB IFN-G CD4+ T-CELLS BLD-ACNC: 0 IU/ML
MCH RBC QN AUTO: 30.2 PG (ref 26.6–33)
MCHC RBC AUTO-ENTMCNC: 32.3 G/DL (ref 31.5–35.7)
MCV RBC AUTO: 94 FL (ref 79–97)
MITOGEN IGNF BLD-ACNC: >10 IU/ML
MONOCYTES # BLD AUTO: 0.6 X10E3/UL (ref 0.1–0.9)
MONOCYTES NFR BLD AUTO: 9 %
NEUTROPHILS # BLD AUTO: 4.5 X10E3/UL (ref 1.4–7)
NEUTROPHILS NFR BLD AUTO: 63 %
PLATELET # BLD AUTO: 244 X10E3/UL (ref 150–450)
POTASSIUM SERPL-SCNC: 5 MMOL/L (ref 3.5–5.2)
PROT SERPL-MCNC: 7.7 G/DL (ref 6–8.5)
QUANTIFERON INCUBATION COMMENT: NORMAL
QUANTIFERON-TB GOLD PLUS: NEGATIVE
RBC # BLD AUTO: 4.93 X10E6/UL (ref 4.14–5.8)
SERVICE CMNT-IMP: NORMAL
SODIUM SERPL-SCNC: 137 MMOL/L (ref 134–144)
WBC # BLD AUTO: 7.1 X10E3/UL (ref 3.4–10.8)

## 2024-11-18 ENCOUNTER — OFFICE VISIT (OUTPATIENT)
Dept: SURGERY | Facility: CLINIC | Age: 54
End: 2024-11-18
Payer: COMMERCIAL

## 2024-11-18 VITALS
BODY MASS INDEX: 37.33 KG/M2 | HEIGHT: 69 IN | WEIGHT: 252 LBS | DIASTOLIC BLOOD PRESSURE: 81 MMHG | TEMPERATURE: 97.4 F | HEART RATE: 91 BPM | OXYGEN SATURATION: 98 % | SYSTOLIC BLOOD PRESSURE: 133 MMHG

## 2024-11-18 DIAGNOSIS — K42.9 UMBILICAL HERNIA: ICD-10-CM

## 2024-11-18 DIAGNOSIS — Z01.818 PREOPERATIVE EXAMINATION: ICD-10-CM

## 2024-11-18 DIAGNOSIS — K40.90 NON-RECURRENT UNILATERAL INGUINAL HERNIA WITHOUT OBSTRUCTION OR GANGRENE: Primary | ICD-10-CM

## 2024-11-18 PROCEDURE — 99203 OFFICE O/P NEW LOW 30 MIN: CPT | Performed by: SURGERY

## 2024-11-18 RX ORDER — CEFAZOLIN SODIUM 2 G/50ML
2000 SOLUTION INTRAVENOUS ONCE
OUTPATIENT
Start: 2024-11-18 | End: 2024-11-18

## 2024-11-18 NOTE — H&P (VIEW-ONLY)
"[facility] History and Physical Note:    Assessment:  Right inguinal hernia, enlarging and causing some symptoms.  Tiny umbilical hernia that is asymptomatic    Pertinent labs reviewed  CMP and CBC 14 November 2024 largely unremarkable  Pertinent images and available reads personally reviewed  Pertinent notes reviewed  I reviewed the last note by his PCP, Dr. Goyal, as well as cardiology.    Plan:  Will schedule for right inguinal hernia repair.  No need to repair tiny umbilical hernia since it is asymptomatic.    Chief Complaint:  \"I am here to schedule surgery\"    HPI  Patient is a 53-year-old gentleman who was seen previously by me in February 2024 for a right inguinal hernia and a small asymptomatic umbilical hernia.  At that time his BMI was 43 and he desired to first attempt to lose weight prior to consideration of surgery.  His BMI is now 37 and he desires repair.    PMH:  Past Medical History:   Diagnosis Date    GERD (gastroesophageal reflux disease)     Hyperlipidemia     Hypertension     Psoriatic arthritis (HCC)        PSH:  Past Surgical History:   Procedure Laterality Date    COLLATERAL LIGAMENT REPAIR, ELBOW Right 7/7/2023    Procedure: LATERAL COLLATERAL LIGAMENT REPAIR WITH INTERNAL BRACE AUGMENTATION;  Surgeon: Tony Barraza;  Location: [facility];  Service: Orthopedics    HIP SURGERY Bilateral        Home Meds:  Current Outpatient Medications on File Prior to Visit   Medication Sig Dispense Refill    esomeprazole (NexIUM) 40 MG capsule Take 1 capsule (40 mg total) by mouth in the morning 90 capsule 2    etanercept (Enbrel) 50 mg/mL SOSY Inject 1 mL (50 mg) under the skin once a week 4 mL 5    Evolocumab (Repatha SureClick) 140 MG/ML SOAJ Inject 140 mg under the skin every 14 (fourteen) days      folic acid (FOLVITE) 1 mg tablet Take 1 mg by mouth daily      lisinopril (ZESTRIL) 5 mg tablet Take 5 mg by mouth daily      Testosterone 12.5 MG/ACT (1%) GEL        No current " facility-administered medications on file prior to visit.       Allergies:  No Known Allergies    Social Hx:  Social History     Socioeconomic History    Marital status: /Civil Union     Spouse name: Not on file    Number of children: Not on file    Years of education: Not on file    Highest education level: Not on file   Occupational History    Not on file   Tobacco Use    Smoking status: Never     Passive exposure: Past    Smokeless tobacco: Never   Vaping Use    Vaping status: Never Used   Substance and Sexual Activity    Alcohol use: Yes     Comment: weekly    Drug use: No    Sexual activity: Not on file   Other Topics Concern    Not on file   Social History Narrative    Not on file     Social Drivers of Health     Financial Resource Strain: Not on file   Food Insecurity: Not on file   Transportation Needs: Not on file   Physical Activity: Not on file   Stress: Not on file   Social Connections: Not on file   Intimate Partner Violence: Not on file   Housing Stability: Not on file        Family Hx:    Family History   Problem Relation Age of Onset    No Known Problems Mother     No Known Problems Father     No Known Problems Sister     No Known Problems Brother     No Known Problems Daughter     No Known Problems Maternal Aunt     No Known Problems Maternal Uncle     No Known Problems Paternal Aunt     No Known Problems Paternal Uncle     No Known Problems Maternal Grandmother     No Known Problems Maternal Grandfather     No Known Problems Paternal Grandmother     No Known Problems Paternal Grandfather     No Known Problems Cousin     Alcohol abuse Neg Hx     Arthritis Neg Hx     Dementia Neg Hx     Cancer Neg Hx     COPD Neg Hx     Depression Neg Hx     Lupus Neg Hx     Drug abuse Neg Hx     Early death Neg Hx     Hearing loss Neg Hx     Hyperlipidemia Neg Hx     Learning disabilities Neg Hx     Substance Abuse Neg Hx     Stroke Neg Hx     Vision loss Neg Hx     Pulmonary embolism Neg Hx     Deep vein  "thrombosis Neg Hx     Mental illness Neg Hx     Crohn's disease Neg Hx     Rheum arthritis Neg Hx     Psoriasis Neg Hx     Asthma Neg Hx          Review of Systems   Constitutional:  Negative for chills and fever.   Respiratory: Negative.     Cardiovascular:         Hypertension under treatment   Gastrointestinal: Negative.    Genitourinary: Negative.    Neurological: Negative.    Hematological: Negative.      /81 (BP Location: Left arm, Patient Position: Sitting, Cuff Size: Large)   Pulse 91   Temp (!) 97.4 °F (36.3 °C) (Core)   Ht 5' 9\" (1.753 m)   Wt 114 kg (252 lb)   SpO2 98%   BMI 37.21 kg/m²       Physical Exam  Vitals reviewed.   Constitutional:       General: He is not in acute distress.  HENT:      Head: Normocephalic and atraumatic.   Cardiovascular:      Rate and Rhythm: Normal rate and regular rhythm.   Pulmonary:      Effort: Pulmonary effort is normal. No respiratory distress.      Breath sounds: Normal breath sounds.   Abdominal:      General: Abdomen is flat. There is no distension.      Palpations: Abdomen is soft.      Comments: Small umbilical hernia.  Small reducible right inguinal hernia.  No left inguinal hernia   Musculoskeletal:         General: Normal range of motion.   Skin:     General: Skin is warm and dry.   Neurological:      General: No focal deficit present.      Mental Status: He is alert.         Pertinent labs reviewed  CMP and CBC 14 November 2024 largely unremarkable  Pertinent images and available reads personally reviewed  Pertinent notes reviewed  I reviewed the last note by his PCP, Dr. Goyal, as well as cardiology.     Salbador Mccain MD FACS  [facility]  [phone]          "

## 2024-11-18 NOTE — H&P
"[facility] History and Physical Note:    Assessment:  Right inguinal hernia, enlarging and causing some symptoms.  Tiny umbilical hernia that is asymptomatic    Pertinent labs reviewed  CMP and CBC 14 November 2024 largely unremarkable  Pertinent images and available reads personally reviewed  Pertinent notes reviewed  I reviewed the last note by his PCP, Dr. Goyal, as well as cardiology.    Plan:  Will schedule for right inguinal hernia repair.  No need to repair tiny umbilical hernia since it is asymptomatic.    Chief Complaint:  \"I am here to schedule surgery\"    HPI  Patient is a 53-year-old gentleman who was seen previously by me in February 2024 for a right inguinal hernia and a small asymptomatic umbilical hernia.  At that time his BMI was 43 and he desired to first attempt to lose weight prior to consideration of surgery.  His BMI is now 37 and he desires repair.    PMH:  Past Medical History:   Diagnosis Date    GERD (gastroesophageal reflux disease)     Hyperlipidemia     Hypertension     Psoriatic arthritis (HCC)        PSH:  Past Surgical History:   Procedure Laterality Date    COLLATERAL LIGAMENT REPAIR, ELBOW Right 7/7/2023    Procedure: LATERAL COLLATERAL LIGAMENT REPAIR WITH INTERNAL BRACE AUGMENTATION;  Surgeon: Tony Barraza;  Location: [facility];  Service: Orthopedics    HIP SURGERY Bilateral        Home Meds:  Current Outpatient Medications on File Prior to Visit   Medication Sig Dispense Refill    esomeprazole (NexIUM) 40 MG capsule Take 1 capsule (40 mg total) by mouth in the morning 90 capsule 2    etanercept (Enbrel) 50 mg/mL SOSY Inject 1 mL (50 mg) under the skin once a week 4 mL 5    Evolocumab (Repatha SureClick) 140 MG/ML SOAJ Inject 140 mg under the skin every 14 (fourteen) days      folic acid (FOLVITE) 1 mg tablet Take 1 mg by mouth daily      lisinopril (ZESTRIL) 5 mg tablet Take 5 mg by mouth daily      Testosterone 12.5 MG/ACT (1%) GEL        No current " facility-administered medications on file prior to visit.       Allergies:  No Known Allergies    Social Hx:  Social History     Socioeconomic History    Marital status: /Civil Union     Spouse name: Not on file    Number of children: Not on file    Years of education: Not on file    Highest education level: Not on file   Occupational History    Not on file   Tobacco Use    Smoking status: Never     Passive exposure: Past    Smokeless tobacco: Never   Vaping Use    Vaping status: Never Used   Substance and Sexual Activity    Alcohol use: Yes     Comment: weekly    Drug use: No    Sexual activity: Not on file   Other Topics Concern    Not on file   Social History Narrative    Not on file     Social Drivers of Health     Financial Resource Strain: Not on file   Food Insecurity: Not on file   Transportation Needs: Not on file   Physical Activity: Not on file   Stress: Not on file   Social Connections: Not on file   Intimate Partner Violence: Not on file   Housing Stability: Not on file        Family Hx:    Family History   Problem Relation Age of Onset    No Known Problems Mother     No Known Problems Father     No Known Problems Sister     No Known Problems Brother     No Known Problems Daughter     No Known Problems Maternal Aunt     No Known Problems Maternal Uncle     No Known Problems Paternal Aunt     No Known Problems Paternal Uncle     No Known Problems Maternal Grandmother     No Known Problems Maternal Grandfather     No Known Problems Paternal Grandmother     No Known Problems Paternal Grandfather     No Known Problems Cousin     Alcohol abuse Neg Hx     Arthritis Neg Hx     Dementia Neg Hx     Cancer Neg Hx     COPD Neg Hx     Depression Neg Hx     Lupus Neg Hx     Drug abuse Neg Hx     Early death Neg Hx     Hearing loss Neg Hx     Hyperlipidemia Neg Hx     Learning disabilities Neg Hx     Substance Abuse Neg Hx     Stroke Neg Hx     Vision loss Neg Hx     Pulmonary embolism Neg Hx     Deep vein  "thrombosis Neg Hx     Mental illness Neg Hx     Crohn's disease Neg Hx     Rheum arthritis Neg Hx     Psoriasis Neg Hx     Asthma Neg Hx          Review of Systems   Constitutional:  Negative for chills and fever.   Respiratory: Negative.     Cardiovascular:         Hypertension under treatment   Gastrointestinal: Negative.    Genitourinary: Negative.    Neurological: Negative.    Hematological: Negative.      /81 (BP Location: Left arm, Patient Position: Sitting, Cuff Size: Large)   Pulse 91   Temp (!) 97.4 °F (36.3 °C) (Core)   Ht 5' 9\" (1.753 m)   Wt 114 kg (252 lb)   SpO2 98%   BMI 37.21 kg/m²       Physical Exam  Vitals reviewed.   Constitutional:       General: He is not in acute distress.  HENT:      Head: Normocephalic and atraumatic.   Cardiovascular:      Rate and Rhythm: Normal rate and regular rhythm.   Pulmonary:      Effort: Pulmonary effort is normal. No respiratory distress.      Breath sounds: Normal breath sounds.   Abdominal:      General: Abdomen is flat. There is no distension.      Palpations: Abdomen is soft.      Comments: Small umbilical hernia.  Small reducible right inguinal hernia.  No left inguinal hernia   Musculoskeletal:         General: Normal range of motion.   Skin:     General: Skin is warm and dry.   Neurological:      General: No focal deficit present.      Mental Status: He is alert.         Pertinent labs reviewed  CMP and CBC 14 November 2024 largely unremarkable  Pertinent images and available reads personally reviewed  Pertinent notes reviewed  I reviewed the last note by his PCP, Dr. Goyal, as well as cardiology.     Salbador Mccain MD FACS  [facility]  [phone]          "

## 2024-11-18 NOTE — PROGRESS NOTES
"[facility] History and Physical Note:    Assessment:  Right inguinal hernia, enlarging and causing some symptoms.  Tiny umbilical hernia that is asymptomatic    Pertinent labs reviewed  CMP and CBC 14 November 2024 largely unremarkable  Pertinent images and available reads personally reviewed  Pertinent notes reviewed  I reviewed the last note by his PCP, Dr. Goyal, as well as cardiology.    Plan:  Will schedule for right inguinal hernia repair.  No need to repair tiny umbilical hernia since it is asymptomatic.    Chief Complaint:  \"I am here to schedule surgery\"    HPI  Patient is a 53-year-old gentleman who was seen previously by me in February 2024 for a right inguinal hernia and a small asymptomatic umbilical hernia.  At that time his BMI was 43 and he desired to first attempt to lose weight prior to consideration of surgery.  His BMI is now 37 and he desires repair.    PMH:  Past Medical History:   Diagnosis Date    GERD (gastroesophageal reflux disease)     Hyperlipidemia     Hypertension     Psoriatic arthritis (HCC)        PSH:  Past Surgical History:   Procedure Laterality Date    COLLATERAL LIGAMENT REPAIR, ELBOW Right 7/7/2023    Procedure: LATERAL COLLATERAL LIGAMENT REPAIR WITH INTERNAL BRACE AUGMENTATION;  Surgeon: Tony Barraza;  Location: [facility];  Service: Orthopedics    HIP SURGERY Bilateral        Home Meds:  Current Outpatient Medications on File Prior to Visit   Medication Sig Dispense Refill    esomeprazole (NexIUM) 40 MG capsule Take 1 capsule (40 mg total) by mouth in the morning 90 capsule 2    etanercept (Enbrel) 50 mg/mL SOSY Inject 1 mL (50 mg) under the skin once a week 4 mL 5    Evolocumab (Repatha SureClick) 140 MG/ML SOAJ Inject 140 mg under the skin every 14 (fourteen) days      folic acid (FOLVITE) 1 mg tablet Take 1 mg by mouth daily      lisinopril (ZESTRIL) 5 mg tablet Take 5 mg by mouth daily      Testosterone 12.5 MG/ACT (1%) GEL        No current " facility-administered medications on file prior to visit.       Allergies:  No Known Allergies    Social Hx:  Social History     Socioeconomic History    Marital status: /Civil Union     Spouse name: Not on file    Number of children: Not on file    Years of education: Not on file    Highest education level: Not on file   Occupational History    Not on file   Tobacco Use    Smoking status: Never     Passive exposure: Past    Smokeless tobacco: Never   Vaping Use    Vaping status: Never Used   Substance and Sexual Activity    Alcohol use: Yes     Comment: weekly    Drug use: No    Sexual activity: Not on file   Other Topics Concern    Not on file   Social History Narrative    Not on file     Social Drivers of Health     Financial Resource Strain: Not on file   Food Insecurity: Not on file   Transportation Needs: Not on file   Physical Activity: Not on file   Stress: Not on file   Social Connections: Not on file   Intimate Partner Violence: Not on file   Housing Stability: Not on file        Family Hx:    Family History   Problem Relation Age of Onset    No Known Problems Mother     No Known Problems Father     No Known Problems Sister     No Known Problems Brother     No Known Problems Daughter     No Known Problems Maternal Aunt     No Known Problems Maternal Uncle     No Known Problems Paternal Aunt     No Known Problems Paternal Uncle     No Known Problems Maternal Grandmother     No Known Problems Maternal Grandfather     No Known Problems Paternal Grandmother     No Known Problems Paternal Grandfather     No Known Problems Cousin     Alcohol abuse Neg Hx     Arthritis Neg Hx     Dementia Neg Hx     Cancer Neg Hx     COPD Neg Hx     Depression Neg Hx     Lupus Neg Hx     Drug abuse Neg Hx     Early death Neg Hx     Hearing loss Neg Hx     Hyperlipidemia Neg Hx     Learning disabilities Neg Hx     Substance Abuse Neg Hx     Stroke Neg Hx     Vision loss Neg Hx     Pulmonary embolism Neg Hx     Deep vein  "thrombosis Neg Hx     Mental illness Neg Hx     Crohn's disease Neg Hx     Rheum arthritis Neg Hx     Psoriasis Neg Hx     Asthma Neg Hx          Review of Systems   Constitutional:  Negative for chills and fever.   Respiratory: Negative.     Cardiovascular:         Hypertension under treatment   Gastrointestinal: Negative.    Genitourinary: Negative.    Neurological: Negative.    Hematological: Negative.      /81 (BP Location: Left arm, Patient Position: Sitting, Cuff Size: Large)   Pulse 91   Temp (!) 97.4 °F (36.3 °C) (Core)   Ht 5' 9\" (1.753 m)   Wt 114 kg (252 lb)   SpO2 98%   BMI 37.21 kg/m²       Physical Exam  Vitals reviewed.   Constitutional:       General: He is not in acute distress.  HENT:      Head: Normocephalic and atraumatic.   Cardiovascular:      Rate and Rhythm: Normal rate and regular rhythm.   Pulmonary:      Effort: Pulmonary effort is normal. No respiratory distress.      Breath sounds: Normal breath sounds.   Abdominal:      General: Abdomen is flat. There is no distension.      Palpations: Abdomen is soft.      Comments: Small umbilical hernia.  Small reducible right inguinal hernia.  No left inguinal hernia   Musculoskeletal:         General: Normal range of motion.   Skin:     General: Skin is warm and dry.   Neurological:      General: No focal deficit present.      Mental Status: He is alert.         Pertinent labs reviewed  CMP and CBC 14 November 2024 largely unremarkable  Pertinent images and available reads personally reviewed  Pertinent notes reviewed  I reviewed the last note by his PCP, Dr. Goyal, as well as cardiology.     Salbador Mccain MD FACS  [facility]  [phone]        "

## 2024-11-22 ENCOUNTER — OFFICE VISIT (OUTPATIENT)
Dept: INTERNAL MEDICINE CLINIC | Facility: CLINIC | Age: 54
End: 2024-11-22
Payer: COMMERCIAL

## 2024-11-22 ENCOUNTER — TELEPHONE (OUTPATIENT)
Age: 54
End: 2024-11-22

## 2024-11-22 VITALS
WEIGHT: 246 LBS | DIASTOLIC BLOOD PRESSURE: 82 MMHG | BODY MASS INDEX: 36.43 KG/M2 | TEMPERATURE: 98.2 F | HEIGHT: 69 IN | HEART RATE: 86 BPM | SYSTOLIC BLOOD PRESSURE: 124 MMHG | OXYGEN SATURATION: 97 % | RESPIRATION RATE: 16 BRPM

## 2024-11-22 DIAGNOSIS — I10 PRIMARY HYPERTENSION: Primary | ICD-10-CM

## 2024-11-22 DIAGNOSIS — E53.8 FOLIC ACID DEFICIENCY: ICD-10-CM

## 2024-11-22 DIAGNOSIS — E78.2 MIXED HYPERLIPIDEMIA: ICD-10-CM

## 2024-11-22 DIAGNOSIS — K21.9 GASTROESOPHAGEAL REFLUX DISEASE WITHOUT ESOPHAGITIS: ICD-10-CM

## 2024-11-22 PROCEDURE — 99213 OFFICE O/P EST LOW 20 MIN: CPT | Performed by: INTERNAL MEDICINE

## 2024-11-22 RX ORDER — FOLIC ACID 1 MG/1
1 TABLET ORAL DAILY
Qty: 30 TABLET | Refills: 16 | Status: SHIPPED | OUTPATIENT
Start: 2024-11-22 | End: 2026-03-21

## 2024-11-22 RX ORDER — ESOMEPRAZOLE MAGNESIUM 40 MG/1
40 CAPSULE, DELAYED RELEASE ORAL DAILY
Qty: 90 CAPSULE | Refills: 2 | Status: SHIPPED | OUTPATIENT
Start: 2024-11-22

## 2024-11-22 NOTE — PROGRESS NOTES
Dr. Hartley's Office Visit Note  24     Christopher Villela 53 y.o. male MRN: [id number]  : 1970    Assessment:     1. Folic acid deficiency  -     folic acid (FOLVITE) 1 mg tablet; Take 1 tablet (1 mg total) by mouth daily  2. Gastroesophageal reflux disease without esophagitis  Assessment & Plan:  Symptoms controlled agree and continue manage medication as follow    Nexium 40 mg daily    History of the diet antireflux measure  Orders:  -     esomeprazole (NexIUM) 40 MG capsule; Take 1 capsule (40 mg total) by mouth in the morning  3. Mixed hyperlipidemia  Assessment & Plan:  Lab Results   Component Value Date    LDLCALC 49 2024      Lab Results   Component Value Date    ALT 19 2024    ALT 15 2024   LDL very well-controlled reviewed as above control patient been losing weight usually low-fat low-cholesterol diet will monitor closely        Discussion Summary and Plan:  Today's care plan and medications were reviewed with patient in detail and all their questions answered to their satisfaction.    Chief Complaint   Patient presents with    Follow-up     Hernia surgery on  - doesn't need clearance; Discuss bloodwork      Subjective:  Amenorrhea follow-up chronic medical condition patient is followed by rheumatologist psoriatic arthritis under control sed rate C-reactive protein normal awaiting surgery for hernia as per surgeon no need for medical clearance no chest pain no difficulty breathing no nausea vomiting no abdominal pain no symptoms all the labs reviewed normal        The following portions of the patient's history were reviewed and updated as appropriate: allergies, current medications, past family history, past medical history, past social history, past surgical history and problem list.    Review of Systems   Constitutional:  Positive for activity change. Negative for appetite change, chills, diaphoresis, fever and unexpected weight change.   HENT:  Negative for congestion,  dental problem, drooling, ear discharge, ear pain, facial swelling, hearing loss, mouth sores, nosebleeds, postnasal drip, rhinorrhea, sinus pressure, sneezing, sore throat, tinnitus, trouble swallowing and voice change.    Eyes:  Negative for photophobia, pain, discharge, redness, itching and visual disturbance.   Respiratory:  Negative for apnea, cough, choking, chest tightness, shortness of breath, wheezing and stridor.    Cardiovascular:  Negative for chest pain, palpitations and leg swelling.   Gastrointestinal:  Negative for abdominal distention, anal bleeding, blood in stool, constipation, diarrhea, nausea, rectal pain and vomiting.   Endocrine: Negative for cold intolerance, heat intolerance, polydipsia, polyphagia and polyuria.   Genitourinary:  Negative for decreased urine volume, difficulty urinating, dysuria, enuresis, frequency, genital sores, hematuria and urgency.   Musculoskeletal:  Positive for arthralgias and gait problem. Negative for back pain, myalgias, neck pain and neck stiffness.   Skin:  Negative for color change, pallor, rash and wound.   Allergic/Immunologic: Negative.  Negative for environmental allergies, food allergies and immunocompromised state.   Neurological:  Negative for dizziness, tremors, seizures, syncope, facial asymmetry, speech difficulty, weakness, light-headedness, numbness and headaches.   Psychiatric/Behavioral:  Negative for agitation, behavioral problems, confusion, decreased concentration, dysphoric mood, hallucinations, self-injury, sleep disturbance and suicidal ideas. The patient is not nervous/anxious and is not hyperactive.          Historical Information   Patient Active Problem List   Diagnosis    Sensorineural hearing loss (SNHL) of both ears    Abnormal LFTs    BPH (benign prostatic hyperplasia)    Psoriatic arthritis (HCC)    GERD (gastroesophageal reflux disease)    Mixed hyperlipidemia    Complete tear of lateral collateral ligament of right elbow     Post-operative state    Sprain of right elbow    S/P musculoskeletal system surgery    Hypogonadism in male    Primary hypertension    Morbid obesity (HCC)    Hyperprolactinemia (HCC)    Right lower quadrant abdominal mass    Right flank pain    Omphalitis    Psoriasis    Long-term use of immunosuppressant medication    Primary generalized (osteo)arthritis    Non-recurrent unilateral inguinal hernia without obstruction or gangrene    Umbilical hernia     Past Medical History:   Diagnosis Date    GERD (gastroesophageal reflux disease)     Hyperlipidemia     Hypertension     Psoriatic arthritis (HCC)      Past Surgical History:   Procedure Laterality Date    COLLATERAL LIGAMENT REPAIR, ELBOW Right 7/7/2023    Procedure: LATERAL COLLATERAL LIGAMENT REPAIR WITH INTERNAL BRACE AUGMENTATION;  Surgeon: Tony Barraza;  Location:  MAIN OR;  Service: Orthopedics    HIP SURGERY Bilateral      Social History     Substance and Sexual Activity   Alcohol Use Yes    Comment: weekly     Social History     Substance and Sexual Activity   Drug Use No     Social History     Tobacco Use   Smoking Status Never    Passive exposure: Past   Smokeless Tobacco Never     Family History   Problem Relation Age of Onset    No Known Problems Mother     No Known Problems Father     No Known Problems Sister     No Known Problems Brother     No Known Problems Daughter     No Known Problems Maternal Aunt     No Known Problems Maternal Uncle     No Known Problems Paternal Aunt     No Known Problems Paternal Uncle     No Known Problems Maternal Grandmother     No Known Problems Maternal Grandfather     No Known Problems Paternal Grandmother     No Known Problems Paternal Grandfather     No Known Problems Cousin     Alcohol abuse Neg Hx     Arthritis Neg Hx     Dementia Neg Hx     Cancer Neg Hx     COPD Neg Hx     Depression Neg Hx     Lupus Neg Hx     Drug abuse Neg Hx     Early death Neg Hx     Hearing loss Neg Hx     Hyperlipidemia Neg Hx      "Learning disabilities Neg Hx     Substance Abuse Neg Hx     Stroke Neg Hx     Vision loss Neg Hx     Pulmonary embolism Neg Hx     Deep vein thrombosis Neg Hx     Mental illness Neg Hx     Crohn's disease Neg Hx     Rheum arthritis Neg Hx     Psoriasis Neg Hx     Asthma Neg Hx      Health Maintenance Due   Topic    COVID-19 Vaccine (1)    Pneumococcal Vaccine: Pediatrics (0 to 5 Years) and At-Risk Patients (6 to 64 Years) (1 of 2 - PCV)    HIV Screening     Zoster Vaccine (1 of 2)    Annual Physical     Influenza Vaccine (1)      Meds/Allergies       Current Outpatient Medications:     esomeprazole (NexIUM) 40 MG capsule, Take 1 capsule (40 mg total) by mouth in the morning, Disp: 90 capsule, Rfl: 2    etanercept (Enbrel) 50 mg/mL SOSY, Inject 1 mL (50 mg) under the skin once a week, Disp: 4 mL, Rfl: 5    Evolocumab (Repatha SureClick) 140 MG/ML SOAJ, Inject 140 mg under the skin every 14 (fourteen) days, Disp: , Rfl:     folic acid (FOLVITE) 1 mg tablet, Take 1 tablet (1 mg total) by mouth daily, Disp: 30 tablet, Rfl: 16    lisinopril (ZESTRIL) 5 mg tablet, Take 5 mg by mouth daily, Disp: , Rfl:     Testosterone 12.5 MG/ACT (1%) GEL, , Disp: , Rfl:       Objective:    Vitals:   /82   Pulse 86   Temp 98.2 °F (36.8 °C)   Resp 16   Ht 5' 9\" (1.753 m)   Wt 112 kg (246 lb)   SpO2 97%   BMI 36.33 kg/m²   Body mass index is 36.33 kg/m².  Vitals:    11/22/24 1130   Weight: 112 kg (246 lb)       Physical Exam  Vitals and nursing note reviewed.   Constitutional:       General: He is not in acute distress.     Appearance: He is well-developed. He is obese. He is not ill-appearing, toxic-appearing or diaphoretic.   HENT:      Head: Normocephalic and atraumatic.      Right Ear: External ear normal.      Left Ear: External ear normal.      Nose: Nose normal.      Mouth/Throat:      Pharynx: No oropharyngeal exudate.   Eyes:      General: Lids are normal. Lids are everted, no foreign bodies appreciated. No scleral " icterus.        Right eye: No discharge.         Left eye: No discharge.      Conjunctiva/sclera: Conjunctivae normal.      Pupils: Pupils are equal, round, and reactive to light.   Neck:      Thyroid: No thyromegaly.      Vascular: Normal carotid pulses. No carotid bruit, hepatojugular reflux or JVD.      Trachea: No tracheal tenderness or tracheal deviation.   Cardiovascular:      Rate and Rhythm: Normal rate and regular rhythm.      Pulses: Normal pulses.      Heart sounds: Normal heart sounds. No murmur heard.     No friction rub. No gallop.   Pulmonary:      Effort: Pulmonary effort is normal. No respiratory distress.      Breath sounds: Normal breath sounds. No stridor. No wheezing or rales.   Chest:      Chest wall: No tenderness.   Abdominal:      General: Bowel sounds are normal. There is no distension.      Palpations: Abdomen is soft. There is no mass.      Tenderness: There is no abdominal tenderness. There is no guarding or rebound.   Musculoskeletal:         General: No tenderness or deformity. Normal range of motion.      Cervical back: Normal range of motion and neck supple. No edema, erythema or rigidity. No spinous process tenderness or muscular tenderness. Normal range of motion.   Lymphadenopathy:      Head:      Right side of head: No submental, submandibular, tonsillar, preauricular or posterior auricular adenopathy.      Left side of head: No submental, submandibular, tonsillar, preauricular, posterior auricular or occipital adenopathy.      Cervical: No cervical adenopathy.      Right cervical: No superficial, deep or posterior cervical adenopathy.     Left cervical: No superficial, deep or posterior cervical adenopathy.      Upper Body:      Right upper body: No pectoral adenopathy.      Left upper body: No pectoral adenopathy.   Skin:     General: Skin is warm and dry.      Coloration: Skin is not pale.      Findings: No erythema or rash.   Neurological:      General: No focal deficit  present.      Mental Status: He is alert and oriented to person, place, and time.      Cranial Nerves: No cranial nerve deficit.      Sensory: No sensory deficit.      Motor: No tremor, abnormal muscle tone or seizure activity.      Coordination: Coordination normal.      Gait: Gait normal.      Deep Tendon Reflexes: Reflexes are normal and symmetric. Reflexes normal.   Psychiatric:         Behavior: Behavior normal.         Thought Content: Thought content normal.         Judgment: Judgment normal.         Lab Review   Orders Only on 11/13/2024   Component Date Value Ref Range Status    White Blood Cell Count 11/14/2024 7.1  3.4 - 10.8 x10E3/uL Final    Red Blood Cell Count 11/14/2024 4.87  4.14 - 5.80 x10E6/uL Final    Hemoglobin 11/14/2024 14.9  13.0 - 17.7 g/dL Final    HCT 11/14/2024 45.6  37.5 - 51.0 % Final    MCV 11/14/2024 94  79 - 97 fL Final    MCH 11/14/2024 30.6  26.6 - 33.0 pg Final    MCHC 11/14/2024 32.7  31.5 - 35.7 g/dL Final    RDW 11/14/2024 13.1  11.6 - 15.4 % Final    Platelet Count 11/14/2024 244  150 - 450 x10E3/uL Final    Neutrophils 11/14/2024 64  Not Estab. % Final    Lymphocytes 11/14/2024 26  Not Estab. % Final    Monocytes 11/14/2024 9  Not Estab. % Final    Eosinophils 11/14/2024 1  Not Estab. % Final    Basophils PCT 11/14/2024 0  Not Estab. % Final    Neutrophils (Absolute) 11/14/2024 4.5  1.4 - 7.0 x10E3/uL Final    Lymphocytes (Absolute) 11/14/2024 1.9  0.7 - 3.1 x10E3/uL Final    Monocytes (Absolute) 11/14/2024 0.7  0.1 - 0.9 x10E3/uL Final    Eosinophils (Absolute) 11/14/2024 0.1  0.0 - 0.4 x10E3/uL Final    Basophils ABS 11/14/2024 0.0  0.0 - 0.2 x10E3/uL Final    Immature Granulocytes 11/14/2024 0  Not Estab. % Final    Immature Granulocytes (Absolute) 11/14/2024 0.0  0.0 - 0.1 x10E3/uL Final    Glucose, Random 11/14/2024 78  70 - 99 mg/dL Final    BUN 11/14/2024 10  6 - 24 mg/dL Final    Creatinine 11/14/2024 0.98  0.76 - 1.27 mg/dL Final    eGFR 11/14/2024 91  >59  mL/min/1.73 Final    SL AMB BUN/CREATININE RATIO 11/14/2024 10  9 - 20 Final    Sodium 11/14/2024 136  134 - 144 mmol/L Final    Potassium 11/14/2024 4.8  3.5 - 5.2 mmol/L Final    Chloride 11/14/2024 96  96 - 106 mmol/L Final    CO2 11/14/2024 24  20 - 29 mmol/L Final    CALCIUM 11/14/2024 9.8  8.7 - 10.2 mg/dL Final    Protein, Total 11/14/2024 7.5  6.0 - 8.5 g/dL Final    Albumin 11/14/2024 4.7  3.8 - 4.9 g/dL Final    Globulin, Total 11/14/2024 2.8  1.5 - 4.5 g/dL Final    TOTAL BILIRUBIN 11/14/2024 1.0  0.0 - 1.2 mg/dL Final    Alk Phos Isoenzymes 11/14/2024 49  44 - 121 IU/L Final    AST 11/14/2024 22  0 - 40 IU/L Final    ALT 11/14/2024 15  0 - 44 IU/L Final    Hemoglobin A1C 11/14/2024 5.5  4.8 - 5.6 % Final    Comment:          Prediabetes: 5.7 - 6.4           Diabetes: >6.4           Glycemic control for adults with diabetes: <7.0      Estimated Average Glucose 11/14/2024 111  mg/dL Final    Cholesterol, Total 11/14/2024 139  100 - 199 mg/dL Final    Triglycerides 11/14/2024 72  0 - 149 mg/dL Final    HDL 11/14/2024 76  >39 mg/dL Final    VLDL Cholesterol Calculated 11/14/2024 14  5 - 40 mg/dL Final    LDL Calculated 11/14/2024 49  0 - 99 mg/dL Final    LDl/HDL Ratio 11/14/2024 0.6  0.0 - 3.6 ratio Final    Comment:                                     LDL/HDL Ratio                                              Men  Women                                1/2 Avg.Risk  1.0    1.5                                    Avg.Risk  3.6    3.2                                 2X Avg.Risk  6.2    5.0                                 3X Avg.Risk  8.0    6.1      Specific Gravity 11/14/2024 1.012  1.005 - 1.030 Final    Ph 11/14/2024 5.5  5.0 - 7.5 Final    Color UA 11/14/2024 Yellow  Yellow Final    Urine Appearance 11/14/2024 Clear  Clear Final    Leukocyte Esterase 11/14/2024 Trace (A)  Negative Final    Protein 11/14/2024 Negative  Negative/Trace Final    Glucose, 24 HR Urine 11/14/2024 Negative  Negative Final     Ketone, Urine 11/14/2024 1+ (A)  Negative Final    Blood, Urine 11/14/2024 Negative  Negative Final    Bilirubin, Urine 11/14/2024 Negative  Negative Final    Urobilinogen Urine 11/14/2024 1.0  0.2 - 1.0 mg/dL Final    Nitrites Urine 11/14/2024 Negative  Negative Final    Microscopic Examination 11/14/2024 See below:   Final    Microscopic was indicated and was performed.    TESTOSTERONE TOTAL 11/14/2024 344  264 - 916 ng/dL Final    Comment: Adult male reference interval is based on a population of  healthy nonobese males (BMI <30) between 19 and 39 years old.  Luc et.al. JCEM 2017,102;9927-9436. PMID: [id number].      Testosterone, Free 11/14/2024 10.8  7.2 - 24.0 pg/mL Final    SL AMB WBC, URINE 11/14/2024 None seen  0 - 5 /hpf Final    RBC, Urine 11/14/2024 None seen  0 - 2 /hpf Final    Epithelial Cells (non renal) 11/14/2024 None seen  0 - 10 /hpf Final    Casts 11/14/2024 None seen  None seen /lpf Final    Bacteria, Urine 11/14/2024 None seen  None seen/Few Final   Telemedicine on 11/12/2024   Component Date Value Ref Range Status    White Blood Cell Count 11/14/2024 7.1  3.4 - 10.8 x10E3/uL Final    Red Blood Cell Count 11/14/2024 4.93  4.14 - 5.80 x10E6/uL Final    Hemoglobin 11/14/2024 14.9  13.0 - 17.7 g/dL Final    HCT 11/14/2024 46.1  37.5 - 51.0 % Final    MCV 11/14/2024 94  79 - 97 fL Final    MCH 11/14/2024 30.2  26.6 - 33.0 pg Final    MCHC 11/14/2024 32.3  31.5 - 35.7 g/dL Final    RDW 11/14/2024 13.1  11.6 - 15.4 % Final    Platelet Count 11/14/2024 244  150 - 450 x10E3/uL Final    Neutrophils 11/14/2024 63  Not Estab. % Final    Lymphocytes 11/14/2024 27  Not Estab. % Final    Monocytes 11/14/2024 9  Not Estab. % Final    Eosinophils 11/14/2024 1  Not Estab. % Final    Basophils PCT 11/14/2024 0  Not Estab. % Final    Neutrophils (Absolute) 11/14/2024 4.5  1.4 - 7.0 x10E3/uL Final    Lymphocytes (Absolute) 11/14/2024 1.9  0.7 - 3.1 x10E3/uL Final    Monocytes (Absolute) 11/14/2024 0.6  0.1 -  0.9 x10E3/uL Final    Eosinophils (Absolute) 11/14/2024 0.1  0.0 - 0.4 x10E3/uL Final    Basophils ABS 11/14/2024 0.0  0.0 - 0.2 x10E3/uL Final    Immature Granulocytes 11/14/2024 0  Not Estab. % Final    Immature Granulocytes (Absolute) 11/14/2024 0.0  0.0 - 0.1 x10E3/uL Final    Glucose, Random 11/14/2024 80  70 - 99 mg/dL Final    BUN 11/14/2024 11  6 - 24 mg/dL Final    Creatinine 11/14/2024 0.94  0.76 - 1.27 mg/dL Final    eGFR 11/14/2024 96  >59 mL/min/1.73 Final    SL AMB BUN/CREATININE RATIO 11/14/2024 12  9 - 20 Final    Sodium 11/14/2024 137  134 - 144 mmol/L Final    Potassium 11/14/2024 5.0  3.5 - 5.2 mmol/L Final    Chloride 11/14/2024 96  96 - 106 mmol/L Final    CO2 11/14/2024 22  20 - 29 mmol/L Final    CALCIUM 11/14/2024 9.9  8.7 - 10.2 mg/dL Final    Protein, Total 11/14/2024 7.7  6.0 - 8.5 g/dL Final    Albumin 11/14/2024 4.7  3.8 - 4.9 g/dL Final    Globulin, Total 11/14/2024 3.0  1.5 - 4.5 g/dL Final    TOTAL BILIRUBIN 11/14/2024 1.0  0.0 - 1.2 mg/dL Final    Alk Phos Isoenzymes 11/14/2024 49  44 - 121 IU/L Final    AST 11/14/2024 22  0 - 40 IU/L Final    ALT 11/14/2024 19  0 - 44 IU/L Final    C-Reactive Protein, Quant 11/14/2024 2  0 - 10 mg/L Final    Sedimentation Rate 11/14/2024 6  0 - 30 mm/hr Final    Quantiferon Incubation Comment 11/14/2024 Incubation performed.   Final    Quantiferon-TB Gold Plus 11/14/2024 Negative  Negative Final    Comment: No response to M tuberculosis antigens detected.  Infection with M tuberculosis is unlikely, but high risk  individuals should be considered for additional testing  (ATS/IDSA/CDC Clinical Practice Guidelines, 2017). The  reference range is an Antigen minus Nil result of <0.35 IU/mL.  Chemiluminescence immunoassay methodology      QuantiFERON Criteria 11/14/2024 Comment   Final    Comment: QuantiFERON-TB Gold Plus is a qualitative indirect test for  M tuberculosis infection (including disease) and is intended for use  in conjunction with risk  "assessment, radiography, and other medical  and diagnostic evaluations. The QuantiFERON-TB Gold Plus result is  determined by subtracting the Nil value from either TB antigen (Ag)  value. The Mitogen tube serves as a control for the test.      LC QFT TB1-NIL 11/14/2024 0.00  IU/mL Final    LC QFT TB2-NIL 11/14/2024 0.00  IU/mL Final    LC QFT NIL 11/14/2024 0.00  IU/mL Final    LC QFT MITOGEN-NIL 11/14/2024 >10.00  IU/mL Final         Patient Instructions   All the blood test is normal urine test is normal    No Ice-Tea     Stuart Hartley MD        \"This note has been constructed using a voice recognition system.Therefore there may be syntax, spelling, and/or grammatical errors. Please call if you have any questions. \"  "

## 2024-11-22 NOTE — ASSESSMENT & PLAN NOTE
Symptoms controlled agree and continue manage medication as follow    Nexium 40 mg daily    History of the diet antireflux measure

## 2024-11-22 NOTE — ASSESSMENT & PLAN NOTE
Blood pressure very well-controlled patient been losing weight asymptomatic    Agree continue manage medication as follow    Lisinopril 5 mg daily low-salt diet low-salt diet exercise diet to lose weight

## 2024-11-22 NOTE — TELEPHONE ENCOUNTER
Patient said he can come in earlier today in the event someone cancels due to weather otherwise he will be in at 11:40am.

## 2024-11-22 NOTE — ASSESSMENT & PLAN NOTE
Lab Results   Component Value Date    LDLCALC 49 11/14/2024      Lab Results   Component Value Date    ALT 19 11/14/2024    ALT 15 11/14/2024   LDL very well-controlled reviewed as above control patient been losing weight usually low-fat low-cholesterol diet will monitor closely

## 2024-12-03 NOTE — PRE-PROCEDURE INSTRUCTIONS
Pre-Surgery Instructions:   Medication Instructions    esomeprazole (NexIUM) 40 MG capsule Take day of surgery.    etanercept (Enbrel) 50 mg/mL SOSY Instructions provided by MD    Evolocumab (Repatha SureClick) 140 MG/ML SOAJ Instructions provided by MD    folic acid (FOLVITE) 1 mg tablet Hold day of surgery.    lisinopril (ZESTRIL) 5 mg tablet Hold day of surgery.    Testosterone 12.5 MG/ACT (1%) GEL Hold day of surgery.   Pt holding biologics 1-2 weeks pre-op. Medication instructions for day surgery reviewed. Please use only a sip of water to take your instructed medications. Avoid all over the counter vitamins, supplements and NSAIDS for one week prior to surgery per anesthesia guidelines. Tylenol is ok to take as needed.     You will receive a call one business day prior to surgery with an arrival time and hospital directions. If your surgery is scheduled on a Monday, the hospital will be calling you on the Friday prior to your surgery. If you have not heard from anyone by 8pm, please call the hospital supervisor through the hospital  at [phone]. ([place] [phone] or [place] [phone]).    Do not eat or drink anything after midnight the night before your surgery, including candy, mints, lifesavers, or chewing gum. Do not drink alcohol 24hrs before your surgery. Try not to smoke at least 24hrs before your surgery.       Follow the pre surgery showering instructions as listed in the “My Surgical Experience Booklet” or otherwise provided by your surgeon's office. Do not use a blade to shave the surgical area 1 week before surgery. It is okay to use a clean electric clippers up to 24 hours before surgery. Do not apply any lotions, creams, including makeup, cologne, deodorant, or perfumes after showering on the day of your surgery. Do not use dry shampoo, hair spray, hair gel, or any type of hair products.     No contact lenses, eye make-up, or artificial eyelashes. Remove nail polish, including  gel polish, and any artificial, gel, or acrylic nails if possible. Remove all jewelry including rings and body piercing jewelry.     Wear causal clothing that is easy to take on and off. Consider your type of surgery.    Keep any valuables, jewelry, piercings at home. Please bring any specially ordered equipment (sling, braces) if indicated.    Arrange for a responsible person to drive you to and from the hospital on the day of your surgery. Please confirm the visitor policy for the day of your procedure when you receive your phone call with an arrival time.     Call the surgeon's office with any new illnesses, exposures, or additional questions prior to surgery.    Please reference your “My Surgical Experience Booklet” for additional information to prepare for your upcoming surgery.

## 2024-12-11 ENCOUNTER — ANESTHESIA EVENT (OUTPATIENT)
Dept: PERIOP | Facility: HOSPITAL | Age: 54
End: 2024-12-11
Payer: COMMERCIAL

## 2024-12-11 ENCOUNTER — ANESTHESIA (OUTPATIENT)
Dept: PERIOP | Facility: HOSPITAL | Age: 54
End: 2024-12-11
Payer: COMMERCIAL

## 2024-12-11 ENCOUNTER — HOSPITAL ENCOUNTER (OUTPATIENT)
Facility: HOSPITAL | Age: 54
Setting detail: OUTPATIENT SURGERY
Discharge: HOME | End: 2024-12-11
Attending: SURGERY | Admitting: SURGERY
Payer: COMMERCIAL

## 2024-12-11 VITALS
SYSTOLIC BLOOD PRESSURE: 139 MMHG | HEIGHT: 69 IN | WEIGHT: 239.2 LBS | RESPIRATION RATE: 16 BRPM | BODY MASS INDEX: 35.43 KG/M2 | DIASTOLIC BLOOD PRESSURE: 76 MMHG | HEART RATE: 60 BPM | TEMPERATURE: 97.8 F | OXYGEN SATURATION: 96 %

## 2024-12-11 DIAGNOSIS — K40.90 NON-RECURRENT UNILATERAL INGUINAL HERNIA WITHOUT OBSTRUCTION OR GANGRENE: Primary | ICD-10-CM

## 2024-12-11 PROCEDURE — 49505 PRP I/HERN INIT REDUC >5 YR: CPT | Performed by: PHYSICIAN ASSISTANT

## 2024-12-11 PROCEDURE — C1781 MESH (IMPLANTABLE): HCPCS | Performed by: SURGERY

## 2024-12-11 PROCEDURE — 49505 PRP I/HERN INIT REDUC >5 YR: CPT | Performed by: SURGERY

## 2024-12-11 DEVICE — BARD MESH, 1" X 4" (2.5 CM X 10 CM)
Type: IMPLANTABLE DEVICE | Site: GROIN | Status: FUNCTIONAL
Brand: BARD

## 2024-12-11 RX ORDER — HYDROMORPHONE HCL IN WATER/PF 6 MG/30 ML
0.2 PATIENT CONTROLLED ANALGESIA SYRINGE INTRAVENOUS
Status: DISCONTINUED | OUTPATIENT
Start: 2024-12-11 | End: 2024-12-11 | Stop reason: HOSPADM

## 2024-12-11 RX ORDER — LIDOCAINE HYDROCHLORIDE 10 MG/ML
INJECTION, SOLUTION EPIDURAL; INFILTRATION; INTRACAUDAL; PERINEURAL AS NEEDED
Status: DISCONTINUED | OUTPATIENT
Start: 2024-12-11 | End: 2024-12-11

## 2024-12-11 RX ORDER — SODIUM CHLORIDE, SODIUM LACTATE, POTASSIUM CHLORIDE, CALCIUM CHLORIDE 600; 310; 30; 20 MG/100ML; MG/100ML; MG/100ML; MG/100ML
100 INJECTION, SOLUTION INTRAVENOUS CONTINUOUS
Status: DISCONTINUED | OUTPATIENT
Start: 2024-12-11 | End: 2024-12-11 | Stop reason: HOSPADM

## 2024-12-11 RX ORDER — SODIUM CHLORIDE, SODIUM LACTATE, POTASSIUM CHLORIDE, CALCIUM CHLORIDE 600; 310; 30; 20 MG/100ML; MG/100ML; MG/100ML; MG/100ML
INJECTION, SOLUTION INTRAVENOUS CONTINUOUS PRN
Status: DISCONTINUED | OUTPATIENT
Start: 2024-12-11 | End: 2024-12-11

## 2024-12-11 RX ORDER — BUPIVACAINE HYDROCHLORIDE AND EPINEPHRINE 5; 5 MG/ML; UG/ML
INJECTION, SOLUTION PERINEURAL AS NEEDED
Status: DISCONTINUED | OUTPATIENT
Start: 2024-12-11 | End: 2024-12-11 | Stop reason: HOSPADM

## 2024-12-11 RX ORDER — ONDANSETRON 2 MG/ML
4 INJECTION INTRAMUSCULAR; INTRAVENOUS ONCE AS NEEDED
Status: DISCONTINUED | OUTPATIENT
Start: 2024-12-11 | End: 2024-12-11 | Stop reason: HOSPADM

## 2024-12-11 RX ORDER — ONDANSETRON 2 MG/ML
INJECTION INTRAMUSCULAR; INTRAVENOUS AS NEEDED
Status: DISCONTINUED | OUTPATIENT
Start: 2024-12-11 | End: 2024-12-11

## 2024-12-11 RX ORDER — HYDROMORPHONE HCL/PF 1 MG/ML
SYRINGE (ML) INJECTION AS NEEDED
Status: DISCONTINUED | OUTPATIENT
Start: 2024-12-11 | End: 2024-12-11

## 2024-12-11 RX ORDER — KETOROLAC TROMETHAMINE 30 MG/ML
INJECTION, SOLUTION INTRAMUSCULAR; INTRAVENOUS AS NEEDED
Status: DISCONTINUED | OUTPATIENT
Start: 2024-12-11 | End: 2024-12-11

## 2024-12-11 RX ORDER — MAGNESIUM HYDROXIDE 1200 MG/15ML
LIQUID ORAL AS NEEDED
Status: DISCONTINUED | OUTPATIENT
Start: 2024-12-11 | End: 2024-12-11 | Stop reason: HOSPADM

## 2024-12-11 RX ORDER — ONDANSETRON 2 MG/ML
4 INJECTION INTRAMUSCULAR; INTRAVENOUS ONCE AS NEEDED
Status: CANCELLED | OUTPATIENT
Start: 2024-12-11

## 2024-12-11 RX ORDER — FENTANYL CITRATE/PF 50 MCG/ML
50 SYRINGE (ML) INJECTION
Refills: 0 | Status: CANCELLED | OUTPATIENT
Start: 2024-12-11

## 2024-12-11 RX ORDER — FENTANYL CITRATE 50 UG/ML
INJECTION, SOLUTION INTRAMUSCULAR; INTRAVENOUS AS NEEDED
Status: DISCONTINUED | OUTPATIENT
Start: 2024-12-11 | End: 2024-12-11

## 2024-12-11 RX ORDER — CEFAZOLIN SODIUM 2 G/50ML
2000 SOLUTION INTRAVENOUS ONCE
Status: COMPLETED | OUTPATIENT
Start: 2024-12-11 | End: 2024-12-11

## 2024-12-11 RX ORDER — DEXAMETHASONE SODIUM PHOSPHATE 10 MG/ML
INJECTION, SOLUTION INTRAMUSCULAR; INTRAVENOUS AS NEEDED
Status: DISCONTINUED | OUTPATIENT
Start: 2024-12-11 | End: 2024-12-11

## 2024-12-11 RX ORDER — PROPOFOL 10 MG/ML
INJECTION, EMULSION INTRAVENOUS AS NEEDED
Status: DISCONTINUED | OUTPATIENT
Start: 2024-12-11 | End: 2024-12-11

## 2024-12-11 RX ORDER — SODIUM CHLORIDE, SODIUM LACTATE, POTASSIUM CHLORIDE, CALCIUM CHLORIDE 600; 310; 30; 20 MG/100ML; MG/100ML; MG/100ML; MG/100ML
100 INJECTION, SOLUTION INTRAVENOUS CONTINUOUS
Status: CANCELLED | OUTPATIENT
Start: 2024-12-11

## 2024-12-11 RX ORDER — MIDAZOLAM HYDROCHLORIDE 2 MG/2ML
INJECTION, SOLUTION INTRAMUSCULAR; INTRAVENOUS AS NEEDED
Status: DISCONTINUED | OUTPATIENT
Start: 2024-12-11 | End: 2024-12-11

## 2024-12-11 RX ORDER — EPHEDRINE SULFATE 50 MG/ML
INJECTION INTRAVENOUS AS NEEDED
Status: DISCONTINUED | OUTPATIENT
Start: 2024-12-11 | End: 2024-12-11

## 2024-12-11 RX ORDER — OXYCODONE AND ACETAMINOPHEN 5; 325 MG/1; MG/1
1 TABLET ORAL EVERY 6 HOURS PRN
Qty: 8 TABLET | Refills: 0 | Status: SHIPPED | OUTPATIENT
Start: 2024-12-11

## 2024-12-11 RX ORDER — FENTANYL CITRATE/PF 50 MCG/ML
25 SYRINGE (ML) INJECTION
Status: DISCONTINUED | OUTPATIENT
Start: 2024-12-11 | End: 2024-12-11 | Stop reason: HOSPADM

## 2024-12-11 RX ADMIN — LIDOCAINE HYDROCHLORIDE 50 MG: 10 INJECTION, SOLUTION EPIDURAL; INFILTRATION; INTRACAUDAL; PERINEURAL at 09:30

## 2024-12-11 RX ADMIN — PROPOFOL 200 MG: 10 INJECTION, EMULSION INTRAVENOUS at 09:30

## 2024-12-11 RX ADMIN — DEXAMETHASONE SODIUM PHOSPHATE 10 MG: 10 INJECTION, SOLUTION INTRAMUSCULAR; INTRAVENOUS at 09:30

## 2024-12-11 RX ADMIN — FENTANYL CITRATE 50 MCG: 50 INJECTION, SOLUTION INTRAMUSCULAR; INTRAVENOUS at 09:30

## 2024-12-11 RX ADMIN — FENTANYL CITRATE 50 MCG: 50 INJECTION, SOLUTION INTRAMUSCULAR; INTRAVENOUS at 09:44

## 2024-12-11 RX ADMIN — ONDANSETRON 4 MG: 2 INJECTION INTRAMUSCULAR; INTRAVENOUS at 09:35

## 2024-12-11 RX ADMIN — CEFAZOLIN SODIUM 2000 MG: 2 SOLUTION INTRAVENOUS at 09:26

## 2024-12-11 RX ADMIN — MIDAZOLAM 2 MG: 1 INJECTION INTRAMUSCULAR; INTRAVENOUS at 09:26

## 2024-12-11 RX ADMIN — EPHEDRINE SULFATE 10 MG: 50 INJECTION, SOLUTION INTRAVENOUS at 10:08

## 2024-12-11 RX ADMIN — KETOROLAC TROMETHAMINE 30 MG: 30 INJECTION, SOLUTION INTRAMUSCULAR at 10:27

## 2024-12-11 RX ADMIN — SODIUM CHLORIDE, SODIUM LACTATE, POTASSIUM CHLORIDE, AND CALCIUM CHLORIDE: .6; .31; .03; .02 INJECTION, SOLUTION INTRAVENOUS at 09:26

## 2024-12-11 RX ADMIN — HYDROMORPHONE HYDROCHLORIDE 0.5 MG: 1 INJECTION, SOLUTION INTRAMUSCULAR; INTRAVENOUS; SUBCUTANEOUS at 09:45

## 2024-12-11 NOTE — DISCHARGE INSTR - AVS FIRST PAGE
1.  Keep incision clean and dry for 2 days.  After 2 days, it may get wet in the shower.  No dressing is required.  2.  Take the ibuprofen that you have at home, 3 tablets, 3 times a day regularly.  3.  Take Percocet, in addition to the ibuprofen, if you need further pain control.  4.  If you do not already have an appointment, call my office at [phone] for appointment to be seen in about 10 to 14 days.

## 2024-12-11 NOTE — OP NOTE
OPERATIVE REPORT  PATIENT NAME: Christopher Villela    :  1970  MRN: [id number]  Pt Location: [place] OR ROOM 02    SURGERY DATE: 2024    Surgeons and Role:     * Salbador Mccain MD - Primary     * Brooks Maxwell PA-C - Assisting    Preop Diagnosis:  Non-recurrent unilateral inguinal hernia without obstruction or gangrene [K40.90]    Post-Op Diagnosis Codes:     * Non-recurrent unilateral inguinal hernia without obstruction or gangrene [K40.90]    Procedure(s):  Right - REPAIR HERNIA INGUINAL    Specimen(s):  * No specimens in log *    Estimated Blood Loss:   Minimal    Drains:  * No LDAs found *    Anesthesia Type:   General    Operative Indications:  Non-recurrent unilateral inguinal hernia without obstruction or gangrene [K40.90]      Operative Findings:  2 large cord lipomas and attenuated inguinal canal floor      Complications:   None    Procedure and Technique:  Right inguinal hernia repair with mesh.    Patient was taken back to main operating, placed supine the operative table, general anesthesia was induced, the right groin was prepped and draped in normal fashion.    Utilizing an incision superior to the medial aspect of the inguinal ligament, skin was incised.  Soft tissue was divided with Bovie cautery.  The external oblique aponeurosis was opened in the direction of its fibers and a Penrose drain was placed around all spermatic cord contents.  The ilioinguinal nerve was sacrificed.    The cremaster muscles were opened and 2 large cord lipomas were dissected free.  There was no indirect sac.  The inguinal canal floor was very thinned and attenuated.  The inguinal canal floor was reconstructed utilizing polypropylene mesh.  This was sewn in a continuous fashion utilizing 2-0 Prolene to the shelving edge of the inguinal ligament inferior and laterally as well as to the conjoined tendon superior and medially.  Tails of the mesh were wrapped around the spermatic cord to fashion a new deep ring.  There is  irrigated and the spermatic cord was returned to its anatomical position in the inguinal canal.    The external oblique aponeurosis was closed with 2-0 Vicryl as well as Kiya's fascia.  3-0 Vicryl was used to approximate the dermis.  4-0 Monocryl was used approximate the skin edges.  A large field block was created at the end of the case.  Exofin was placed as a dressing.    The patient will from general anesthesia, was extubated in the operating, and sent to the PACU in stable condition.    NADEEM Maxwell was required for technical assistance and retraction   I was present for the entire procedure., A qualified resident physician was not available., and A physician assistant was required during the procedure for retraction, tissue handling, dissection and suturing.    Patient Disposition:  PACU              SIGNATURE: Salbador Mccain MD  DATE: December 11, 2024  TIME: 10:10 AM

## 2024-12-11 NOTE — PROGRESS NOTES
Reviewed discharge instructions with patient. Patient dressed independently. Transported patient via wheelchair to front entrance. Rohini Ahmadi

## 2024-12-11 NOTE — INTERVAL H&P NOTE
H&P reviewed. After examining the patient I find no changes in the patients condition since the H&P had been written.    I reviewed the vital signs.  
Explanation of exam/test

## 2024-12-11 NOTE — ANESTHESIA POSTPROCEDURE EVALUATION
Post-Op Assessment Note    CV Status:  Stable  Pain Score: 1    Pain management: adequate       Mental Status:  Alert and awake   Hydration Status:  Euvolemic and stable   PONV Controlled:  Controlled   Airway Patency:  Patent     Post Op Vitals Reviewed: Yes    No anethesia notable event occurred.    Staff: Anesthesiologist           Last Filed PACU Vitals:  Vitals Value Taken Time   Temp 97.8 °F (36.6 °C) 12/11/24 1038   Pulse 60 12/11/24 1113   /76 12/11/24 1113   Resp 16 12/11/24 1113   SpO2 96 % 12/11/24 1113       Modified Felicia:  Activity: 2 (12/11/2024 10:38 AM)  Respiration: 2 (12/11/2024 10:38 AM)  Circulation: 2 (12/11/2024 10:38 AM)  Consciousness: 2 (12/11/2024 10:38 AM)  Oxygen Saturation: 2 (12/11/2024 10:38 AM)  Modified Felicia Score: 10 (12/11/2024 10:38 AM)

## 2024-12-11 NOTE — ANESTHESIA POSTPROCEDURE EVALUATION
Post-Op Assessment Note    CV Status:  Stable  Pain Score: 0    Pain management: adequate       Mental Status:  Alert and awake   Hydration Status:  Euvolemic   PONV Controlled:  Controlled   Airway Patency:  Patent  Two or more mitigation strategies used for obstructive sleep apnea   Post Op Vitals Reviewed: Yes    No anethesia notable event occurred.    Staff: Anesthesiologist, CRNA           Last Filed PACU Vitals:  Vitals Value Taken Time   Temp     Pulse     BP     Resp     SpO2         Modified Felicia:  No data recorded

## 2024-12-11 NOTE — PERIOPERATIVE NURSING NOTE
Right groin exofin dsg dry, intact. Ice to incision site, Call bell in reach, instructed to call when need to void.

## 2024-12-11 NOTE — ANESTHESIA PREPROCEDURE EVALUATION
Procedure:  REPAIR HERNIA INGUINAL (Right: Groin)    Relevant Problems   ANESTHESIA (within normal limits)      CARDIO   (+) Mixed hyperlipidemia   (+) Primary hypertension      ENDO (within normal limits)      GI/HEPATIC   (+) GERD (gastroesophageal reflux disease)      /RENAL   (+) BPH (benign prostatic hyperplasia)      MUSCULOSKELETAL   (+) Primary generalized (osteo)arthritis   (+) Psoriatic arthritis (HCC)      PULMONARY (within normal limits)        Physical Exam    Airway    Mallampati score: II  TM Distance: >3 FB  Neck ROM: full     Dental   No notable dental hx     Cardiovascular  Rhythm: regular, Rate: normal    Pulmonary   Breath sounds clear to auscultation    Other Findings        Anesthesia Plan  ASA Score- 2     Anesthesia Type- general with ASA Monitors.         Additional Monitors:     Airway Plan: LMA.           Plan Factors-Exercise tolerance (METS): >4 METS.    Chart reviewed. EKG reviewed.  Existing labs reviewed. Patient summary reviewed.    Patient is not a current smoker.              Induction- intravenous.    Postoperative Plan- Plan for postoperative opioid use. Planned trial extubation        Informed Consent- Anesthetic plan and risks discussed with patient.  I personally reviewed this patient with the CRNA. Discussed and agreed on the Anesthesia Plan with the CRNA..

## 2025-01-02 ENCOUNTER — OFFICE VISIT (OUTPATIENT)
Dept: SURGERY | Facility: CLINIC | Age: 55
End: 2025-01-02

## 2025-01-02 VITALS — WEIGHT: 240 LBS | HEIGHT: 69 IN | TEMPERATURE: 97.1 F | BODY MASS INDEX: 35.55 KG/M2

## 2025-01-02 DIAGNOSIS — Z09 SURGERY FOLLOW-UP EXAMINATION: Primary | ICD-10-CM

## 2025-01-02 PROCEDURE — 99024 POSTOP FOLLOW-UP VISIT: CPT | Performed by: SURGERY

## 2025-01-02 RX ORDER — CEPHALEXIN 500 MG/1
500 CAPSULE ORAL EVERY 6 HOURS SCHEDULED
Qty: 28 CAPSULE | Refills: 0 | Status: SHIPPED | OUTPATIENT
Start: 2025-01-02 | End: 2025-01-09

## 2025-01-02 NOTE — PROGRESS NOTES
Patient status post right inguinal hernia repair 11 December 2024.  He is here for routine follow-up.  He reports minimal pain and no wound issues.    Incision healing nicely.  Small amount of erythema and induration medial aspect of the incision which could represent a superficial skin infection.  Will treat with Keflex for 7 days.  Patient counseled.  Follow-up as needed.

## 2025-03-14 DIAGNOSIS — L40.50 PSORIATIC ARTHRITIS (HCC): ICD-10-CM

## 2025-03-14 RX ORDER — ETANERCEPT 50 MG/ML
SOLUTION SUBCUTANEOUS
Qty: 4 ML | Refills: 5 | Status: SHIPPED | OUTPATIENT
Start: 2025-03-14

## 2025-03-14 NOTE — TELEPHONE ENCOUNTER
Reason for call:   [x] Refill   [] Prior Auth  [] Other:     Office:   [] PCP/Provider -   [x] Specialty/Provider - Emerald Tong     Medication: etanercept (Enbrel) 50 mg/mL     Dose/Frequency:  Inject 1 mL (50 mg) under the skin once a week     Quantity: 4ml    Pharmacy: [facility]   Does the patient have enough for 3 days?   [] Yes   [x] No - Send as HP to POD    [facility] Away Pharmacy   Does the patient have enough for 10 days?   [] Yes   [] No - Send as HP to POD

## 2025-03-17 NOTE — TELEPHONE ENCOUNTER
Called pharmacy as we keep getting refill requests even though a refill was just sent on 3/14. Pharmacy states no additional refills are needed at this time and to disregard requests.

## 2025-04-29 DIAGNOSIS — R79.89 ABNORMAL LFTS: ICD-10-CM

## 2025-04-29 DIAGNOSIS — E78.2 MIXED HYPERLIPIDEMIA: ICD-10-CM

## 2025-04-29 DIAGNOSIS — Z79.60 LONG-TERM USE OF IMMUNOSUPPRESSANT MEDICATION: ICD-10-CM

## 2025-04-29 DIAGNOSIS — E29.1 HYPOGONADISM IN MALE: ICD-10-CM

## 2025-04-29 DIAGNOSIS — I10 PRIMARY HYPERTENSION: ICD-10-CM

## 2025-04-29 DIAGNOSIS — R73.02 GLUCOSE INTOLERANCE (IMPAIRED GLUCOSE TOLERANCE): Primary | ICD-10-CM

## 2025-05-02 LAB
ALBUMIN SERPL-MCNC: 4.5 G/DL (ref 3.8–4.9)
ALP SERPL-CCNC: 38 IU/L (ref 44–121)
ALT SERPL-CCNC: 20 IU/L (ref 0–44)
APPEARANCE UR: CLEAR
AST SERPL-CCNC: 21 IU/L (ref 0–40)
BACTERIA URNS QL MICRO: NORMAL
BASOPHILS # BLD AUTO: 0 X10E3/UL (ref 0–0.2)
BASOPHILS NFR BLD AUTO: 1 %
BILIRUB SERPL-MCNC: 0.9 MG/DL (ref 0–1.2)
BILIRUB UR QL STRIP: NEGATIVE
BUN SERPL-MCNC: 11 MG/DL (ref 6–24)
BUN/CREAT SERPL: 13 (ref 9–20)
CALCIUM SERPL-MCNC: 9.3 MG/DL (ref 8.7–10.2)
CASTS URNS QL MICRO: NORMAL /LPF
CHLORIDE SERPL-SCNC: 96 MMOL/L (ref 96–106)
CHOLEST SERPL-MCNC: 109 MG/DL (ref 100–199)
CO2 SERPL-SCNC: 19 MMOL/L (ref 20–29)
COLOR UR: YELLOW
CREAT SERPL-MCNC: 0.87 MG/DL (ref 0.76–1.27)
EGFR: 101 ML/MIN/1.73
EOSINOPHIL # BLD AUTO: 0.1 X10E3/UL (ref 0–0.4)
EOSINOPHIL NFR BLD AUTO: 1 %
EPI CELLS #/AREA URNS HPF: NORMAL /HPF (ref 0–10)
ERYTHROCYTE [DISTWIDTH] IN BLOOD BY AUTOMATED COUNT: 13.3 % (ref 11.6–15.4)
GLOBULIN SER-MCNC: 1.9 G/DL (ref 1.5–4.5)
GLUCOSE SERPL-MCNC: 80 MG/DL (ref 70–99)
GLUCOSE UR QL: NEGATIVE
HBA1C MFR BLD: 5.6 % (ref 4.8–5.6)
HCT VFR BLD AUTO: 43.9 % (ref 37.5–51)
HDLC SERPL-MCNC: 56 MG/DL
HGB BLD-MCNC: 14 G/DL (ref 13–17.7)
HGB UR QL STRIP: NEGATIVE
IMM GRANULOCYTES # BLD: 0 X10E3/UL (ref 0–0.1)
IMM GRANULOCYTES NFR BLD: 0 %
KETONES UR QL STRIP: ABNORMAL
LDLC SERPL CALC-MCNC: 37 MG/DL (ref 0–99)
LDLC/HDLC SERPL: 0.7 RATIO (ref 0–3.6)
LEUKOCYTE ESTERASE UR QL STRIP: NEGATIVE
LYMPHOCYTES # BLD AUTO: 1.3 X10E3/UL (ref 0.7–3.1)
LYMPHOCYTES NFR BLD AUTO: 32 %
MCH RBC QN AUTO: 29.6 PG (ref 26.6–33)
MCHC RBC AUTO-ENTMCNC: 31.9 G/DL (ref 31.5–35.7)
MCV RBC AUTO: 93 FL (ref 79–97)
MICRO URNS: ABNORMAL
MICRO URNS: ABNORMAL
MONOCYTES # BLD AUTO: 0.5 X10E3/UL (ref 0.1–0.9)
MONOCYTES NFR BLD AUTO: 13 %
NEUTROPHILS # BLD AUTO: 2.1 X10E3/UL (ref 1.4–7)
NEUTROPHILS NFR BLD AUTO: 53 %
NITRITE UR QL STRIP: NEGATIVE
PH UR STRIP: 6 [PH] (ref 5–7.5)
PLATELET # BLD AUTO: 244 X10E3/UL (ref 150–450)
POTASSIUM SERPL-SCNC: 4.5 MMOL/L (ref 3.5–5.2)
PROT SERPL-MCNC: 6.4 G/DL (ref 6–8.5)
PROT UR QL STRIP: NEGATIVE
RBC # BLD AUTO: 4.73 X10E6/UL (ref 4.14–5.8)
RBC #/AREA URNS HPF: NORMAL /HPF (ref 0–2)
SL AMB VLDL CHOLESTEROL CALC: 16 MG/DL (ref 5–40)
SODIUM SERPL-SCNC: 136 MMOL/L (ref 134–144)
SP GR UR: 1.02 (ref 1–1.03)
TESTOST FREE SERPL-MCNC: 11.1 PG/ML (ref 7.2–24)
TESTOST SERPL-MCNC: 428 NG/DL (ref 264–916)
TRIGL SERPL-MCNC: 76 MG/DL (ref 0–149)
UROBILINOGEN UR STRIP-ACNC: 0.2 MG/DL (ref 0.2–1)
WBC # BLD AUTO: 4 X10E3/UL (ref 3.4–10.8)
WBC #/AREA URNS HPF: NORMAL /HPF (ref 0–5)

## 2025-05-03 ENCOUNTER — RESULTS FOLLOW-UP (OUTPATIENT)
Dept: INTERNAL MEDICINE CLINIC | Facility: CLINIC | Age: 55
End: 2025-05-03

## 2025-05-20 ENCOUNTER — OFFICE VISIT (OUTPATIENT)
Dept: INTERNAL MEDICINE CLINIC | Facility: CLINIC | Age: 55
End: 2025-05-20
Payer: COMMERCIAL

## 2025-05-20 VITALS
DIASTOLIC BLOOD PRESSURE: 78 MMHG | HEART RATE: 54 BPM | BODY MASS INDEX: 35.7 KG/M2 | SYSTOLIC BLOOD PRESSURE: 130 MMHG | WEIGHT: 241 LBS | HEIGHT: 69 IN | OXYGEN SATURATION: 99 %

## 2025-05-20 DIAGNOSIS — L40.50 PSORIATIC ARTHRITIS (HCC): ICD-10-CM

## 2025-05-20 DIAGNOSIS — Z00.00 ANNUAL PHYSICAL EXAM: ICD-10-CM

## 2025-05-20 DIAGNOSIS — E22.1 HYPERPROLACTINEMIA (HCC): ICD-10-CM

## 2025-05-20 DIAGNOSIS — S20.362A TICK BITE OF LEFT FRONT WALL OF THORAX, INITIAL ENCOUNTER: Primary | ICD-10-CM
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
## 2025-05-20 PROCEDURE — 99213 OFFICE O/P EST LOW 20 MIN: CPT | Performed by: INTERNAL MEDICINE

## 2025-05-20 PROCEDURE — 99396 PREV VISIT EST AGE 40-64: CPT | Performed by: INTERNAL MEDICINE

## 2025-05-20 RX ORDER — DOXYCYCLINE 100 MG/1
100 CAPSULE ORAL EVERY 12 HOURS SCHEDULED
Qty: 28 CAPSULE | Refills: 0 | Status: SHIPPED | OUTPATIENT
Start: 2025-05-20 | End: 2025-06-03

## 2025-05-20 NOTE — PATIENT INSTRUCTIONS
"Patient Education     Routine physical for adults   The Basics   Written by the doctors and editors at [facility]   What is a physical? -- A physical is a routine visit, or \"check-up,\" with your doctor. You might also hear it called a \"wellness visit\" or \"preventive visit.\"  During each visit, the doctor will:   Ask about your physical and mental health   Ask about your habits, behaviors, and lifestyle   Do an exam   Give you vaccines if needed   Talk to you about any medicines you take   Give advice about your health   Answer your questions  Getting regular check-ups is an important part of taking care of your health. It can help your doctor find and treat any problems you have. But it's also important for preventing health problems.  A routine physical is different from a \"sick visit.\" A sick visit is when you see a doctor because of a health concern or problem. Since physicals are scheduled ahead of time, you can think about what you want to ask the doctor.  How often should I get a physical? -- It depends on your age and health. In general, for people age 21 years and older:   If you are younger than 50 years, you might be able to get a physical every 3 years.   If you are 50 years or older, your doctor might recommend a physical every year.  If you have an ongoing health condition, like diabetes or high blood pressure, your doctor will probably want to see you more often.  What happens during a physical? -- In general, each visit will include:   Physical exam - The doctor or nurse will check your height, weight, heart rate, and blood pressure. They will also look at your eyes and ears. They will ask about how you are feeling and whether you have any symptoms that bother you.   Medicines - It's a good idea to bring a list of all the medicines you take to each doctor visit. Your doctor will talk to you about your medicines and answer any questions. Tell them if you are having any side effects that bother you. You " "should also tell them if you are having trouble paying for any of your medicines.   Habits and behaviors - This includes:   Your diet   Your exercise habits   Whether you smoke, drink alcohol, or use drugs   Whether you are sexually active   Whether you feel safe at home  Your doctor will talk to you about things you can do to improve your health and lower your risk of health problems. They will also offer help and support. For example, if you want to quit smoking, they can give you advice and might prescribe medicines. If you want to improve your diet or get more physical activity, they can help you with this, too.   Lab tests, if needed - The tests you get will depend on your age and situation. For example, your doctor might want to check your:   Cholesterol   Blood sugar   Iron level   Vaccines - The recommended vaccines will depend on your age, health, and what vaccines you already had. Vaccines are very important because they can prevent certain serious or deadly infections.   Discussion of screening - \"Screening\" means checking for diseases or other health problems before they cause symptoms. Your doctor can recommend screening based on your age, risk, and preferences. This might include tests to check for:   Cancer, such as breast, prostate, cervical, ovarian, colorectal, prostate, lung, or skin cancer   Sexually transmitted infections, such as chlamydia and gonorrhea   Mental health conditions like depression and anxiety  Your doctor will talk to you about the different types of screening tests. They can help you decide which screenings to have. They can also explain what the results might mean.   Answering questions - The physical is a good time to ask the doctor or nurse questions about your health. If needed, they can refer you to other doctors or specialists, too.  Adults older than 65 years often need other care, too. As you get older, your doctor will talk to you about:   How to prevent falling at " home   Hearing or vision tests   Memory testing   How to take your medicines safely   Making sure that you have the help and support you need at home  All topics are updated as new evidence becomes available and our peer review process is complete.  This topic retrieved from [employer] on: May 02, 2024.  Topic 582446 Version 1.0  Release: 32.4.3 - C32.122  © 2024 UpToDate, Inc. and/or its affiliates. All rights reserved.  Consumer Information Use and Disclaimer   Disclaimer: This generalized information is a limited summary of diagnosis, treatment, and/or medication information. It is not meant to be comprehensive and should be used as a tool to help the user understand and/or assess potential diagnostic and treatment options. It does NOT include all information about conditions, treatments, medications, side effects, or risks that may apply to a specific patient. It is not intended to be medical advice or a substitute for the medical advice, diagnosis, or treatment of a health care provider based on the health care provider's examination and assessment of a patient's specific and unique circumstances. Patients must speak with a health care provider for complete information about their health, medical questions, and treatment options, including any risks or benefits regarding use of medications. This information does not endorse any treatments or medications as safe, effective, or approved for treating a specific patient. UpToDate, Inc. and its affiliates disclaim any warranty or liability relating to this information or the use thereof.The use of this information is governed by the Terms of Use, available at https://www.woltersPricefallsuwer.com/en/know/clinical-effectiveness-terms. 2024© UpToDate, Inc. and its affiliates and/or licensors. All rights reserved.  Copyright   © 2024 UpToDate, Inc. and/or its affiliates. All rights reserved.

## 2025-05-20 NOTE — PROGRESS NOTES
Adult Annual Physical  Name: Christopher Villela      : 1970      MRN: [id number]  Encounter Provider: Stuart Hartley MD  Encounter Date: 2025   Encounter department: [facility]    :  Assessment & Plan  Annual physical exam  Complete physical done    All preventive measure discussed    Age-appropriate screening done    Counseling screening follow-up recommendations see attached encounter       Hyperprolactinemia (HCC)  Seen by urologist as a complete hormonal workup done for hypogonadism on testosterone replacement therapy with AndroGel repeat prolactin level low normal no further intervention needed asymptomatic no gynecomastia will monitor closely       Psoriatic arthritis (HCC)  Followed by rheumatologist treatment as follows    Treatment as follows followed by rheumatologist    Enbrel 50 mg once weekly and meloxicam 15 mg daily as needed which he takes very sparingly.  He seems to be stable overall without any significant complaints and as a result I recommend continuing the same regimen unchanged.  He will update high-risk medication lab monitoring to assess for drug toxicities.  noSide effects       Tick bite of left front wall of thorax, initial encounter  Comes in complaining of a tick bite about 2 days ago left lower chest now a half a centimeter diameter erythematous red with yellowish infected area in the center no discharge complains of pain around the area    History of Lyme's in the past    Will treat with doxycycline 100 twice daily for 2 weeks explained the signs and symptoms of Lyme's disease if any of the symptoms should come in for follow-up check Lyme titer after 3 months  Orders:  •  doxycycline hyclate (VIBRAMYCIN) 100 mg capsule; Take 1 capsule (100 mg total) by mouth every 12 (twelve) hours for 14 days        Preventive Screenings:  - Diabetes Screening: screening up-to-date  - Cholesterol Screening: screening not indicated and has hyperlipidemia   -  Hepatitis C screening: screening up-to-date   - HIV screening: patient declines   - Colon cancer screening: screening up-to-date   - Lung cancer screening: screening not indicated   - Prostate cancer screening: risks/benefits discussed     Immunizations:  - Immunizations due: Prevnar 20 and Zoster (Shingrix)    Counseling/Anticipatory Guidance:  - Alcohol: discussed moderation in alcohol intake and recommendations for healthy alcohol use.   - Drug use: discussed harms of illicit drug use and how it can negatively impact mental/physical health.   - Tobacco use: discussed harms of tobacco use and management options for quitting.   - Dental health: discussed importance of regular tooth brushing, flossing, and dental visits.   - Sexual health: discussed sexually transmitted diseases, partner selection, use of condoms, avoidance of unintended pregnancy, and contraceptive alternatives.   - Diet: discussed recommendations for a healthy/well-balanced diet.   - Exercise: the importance of regular exercise/physical activity was discussed. Recommend exercise 3-5 times per week for at least 30 minutes.   - Injury prevention: discussed safety/seat belts, safety helmets, smoke detectors, carbon monoxide detectors, and smoking near bedding or upholstery.          History of Present Illness     Patient came in with complaint tick bite about 2 days ago with some redness at the site of a tick bite with pain along the tick bite denies any fever chills headache nausea vomiting or any other ASSO symptoms on review of the lab all the labs reviewed discussed at length    Adult Annual Physical:  Patient presents for annual physical.     Diet and Physical Activity:  - Diet/Nutrition: no special diet.  - Exercise: strength training exercises, walking and 5-7 times a week on average.    Depression Screening:  - PHQ-2 Score: 0    General Health:  - Sleep: 4-6 hours of sleep on average and snores loudly.  - Hearing: decreased hearing bilateral  ears.  - Vision: wears contacts.  - Dental: regular dental visits.     Health:  - History of STDs: no.   - Urinary symptoms: none.     Advanced Care Planning:  - Has an advanced directive?: no    - Has a durable medical POA?: no      Review of Systems   Constitutional:  Positive for activity change. Negative for appetite change, chills, diaphoresis, fever and unexpected weight change.   HENT:  Negative for congestion, dental problem, drooling, ear discharge, ear pain, facial swelling, hearing loss, mouth sores, nosebleeds, postnasal drip, rhinorrhea, sinus pressure, sneezing, sore throat, tinnitus, trouble swallowing and voice change.    Eyes:  Negative for photophobia, pain, discharge, redness, itching and visual disturbance.   Respiratory:  Negative for apnea, cough, choking, chest tightness, shortness of breath, wheezing and stridor.    Cardiovascular:  Negative for chest pain, palpitations and leg swelling.   Gastrointestinal:  Negative for abdominal distention, anal bleeding, blood in stool, constipation, diarrhea, nausea, rectal pain and vomiting.   Endocrine: Negative for cold intolerance, heat intolerance, polydipsia, polyphagia and polyuria.   Genitourinary:  Negative for decreased urine volume, difficulty urinating, dysuria, enuresis, frequency, genital sores, hematuria and urgency.   Musculoskeletal:  Positive for arthralgias, gait problem and myalgias. Negative for back pain, neck pain and neck stiffness.   Skin:  Negative for color change, pallor, rash and wound.   Allergic/Immunologic: Negative.  Negative for environmental allergies, food allergies and immunocompromised state.   Neurological:  Negative for dizziness, tremors, seizures, syncope, facial asymmetry, speech difficulty, weakness, light-headedness, numbness and headaches.   Psychiatric/Behavioral:  Negative for agitation, behavioral problems, confusion, decreased concentration, dysphoric mood, hallucinations, self-injury, sleep disturbance  "and suicidal ideas. The patient is not nervous/anxious and is not hyperactive.          Objective   /78   Pulse (!) 54   Ht 5' 9\" (1.753 m)   Wt 109 kg (241 lb)   SpO2 99%   BMI 35.59 kg/m²     Physical Exam  Vitals and nursing note reviewed.   Constitutional:       General: He is not in acute distress.     Appearance: He is well-developed. He is not ill-appearing, toxic-appearing or diaphoretic.   HENT:      Head: Normocephalic and atraumatic.      Right Ear: External ear normal.      Left Ear: External ear normal.      Nose: Nose normal.      Mouth/Throat:      Pharynx: No oropharyngeal exudate.     Eyes:      General: Lids are normal. Lids are everted, no foreign bodies appreciated. No scleral icterus.        Right eye: No discharge.         Left eye: No discharge.      Conjunctiva/sclera: Conjunctivae normal.      Pupils: Pupils are equal, round, and reactive to light.     Neck:      Thyroid: No thyromegaly.      Vascular: Normal carotid pulses. No carotid bruit, hepatojugular reflux or JVD.      Trachea: No tracheal tenderness or tracheal deviation.     Cardiovascular:      Rate and Rhythm: Normal rate and regular rhythm.      Pulses: Normal pulses.      Heart sounds: Normal heart sounds. No murmur heard.     No friction rub. No gallop.   Pulmonary:      Effort: Pulmonary effort is normal. No respiratory distress.      Breath sounds: Normal breath sounds. No stridor. No wheezing or rales.   Chest:      Chest wall: No tenderness.   Abdominal:      General: Bowel sounds are normal. There is no distension.      Palpations: Abdomen is soft. There is no mass.      Tenderness: There is no abdominal tenderness. There is no guarding or rebound.     Musculoskeletal:         General: No tenderness or deformity. Normal range of motion.      Cervical back: Normal range of motion and neck supple. No edema, erythema or rigidity. No spinous process tenderness or muscular tenderness. Normal range of motion. "   Lymphadenopathy:      Head:      Right side of head: No submental, submandibular, tonsillar, preauricular or posterior auricular adenopathy.      Left side of head: No submental, submandibular, tonsillar, preauricular, posterior auricular or occipital adenopathy.      Cervical: No cervical adenopathy.      Right cervical: No superficial, deep or posterior cervical adenopathy.     Left cervical: No superficial, deep or posterior cervical adenopathy.      Upper Body:      Right upper body: No pectoral adenopathy.      Left upper body: No pectoral adenopathy.     Skin:     General: Skin is warm and dry.      Coloration: Skin is not pale.      Findings: No erythema or rash.     Neurological:      General: No focal deficit present.      Mental Status: He is alert and oriented to person, place, and time.      Cranial Nerves: No cranial nerve deficit.      Sensory: No sensory deficit.      Motor: No tremor, abnormal muscle tone or seizure activity.      Coordination: Coordination normal.      Gait: Gait normal.      Deep Tendon Reflexes: Reflexes are normal and symmetric. Reflexes normal.     Psychiatric:         Behavior: Behavior normal.         Thought Content: Thought content normal.         Judgment: Judgment normal.

## 2025-05-20 NOTE — ASSESSMENT & PLAN NOTE
Followed by rheumatologist treatment as follows    Treatment as follows followed by rheumatologist    Enbrel 50 mg once weekly and meloxicam 15 mg daily as needed which he takes very sparingly.  He seems to be stable overall without any significant complaints and as a result I recommend continuing the same regimen unchanged.  He will update high-risk medication lab monitoring to assess for drug toxicities.  noSide effects

## 2025-05-20 NOTE — ASSESSMENT & PLAN NOTE
Seen by urologist as a complete hormonal workup done for hypogonadism on testosterone replacement therapy with AndroGel repeat prolactin level low normal no further intervention needed asymptomatic no gynecomastia will monitor closely

## 2025-06-02 ENCOUNTER — OFFICE VISIT (OUTPATIENT)
Dept: INTERNAL MEDICINE CLINIC | Facility: CLINIC | Age: 55
End: 2025-06-02
Payer: COMMERCIAL

## 2025-06-02 VITALS
OXYGEN SATURATION: 98 % | HEART RATE: 80 BPM | SYSTOLIC BLOOD PRESSURE: 124 MMHG | DIASTOLIC BLOOD PRESSURE: 80 MMHG | BODY MASS INDEX: 35.7 KG/M2 | HEIGHT: 69 IN | WEIGHT: 241 LBS

## 2025-06-02 DIAGNOSIS — E29.1 HYPOGONADISM IN MALE: ICD-10-CM

## 2025-06-02 DIAGNOSIS — L40.50 PSORIATIC ARTHRITIS (HCC): Primary | ICD-10-CM

## 2025-06-02 PROCEDURE — 99213 OFFICE O/P EST LOW 20 MIN: CPT | Performed by: INTERNAL MEDICINE

## 2025-06-02 RX ORDER — METHYLPREDNISOLONE 4 MG/1
TABLET ORAL
Qty: 21 EACH | Refills: 0 | Status: SHIPPED | OUTPATIENT
Start: 2025-06-02

## 2025-06-02 NOTE — ASSESSMENT & PLAN NOTE
Came in patient under care of rheumatologist for psoriatic arthritis  Enbrel 50 mg once weekly and meloxicam 15 mg daily as needed which he takes very sparingly.  He seems to be stable overall without any significant complaints and as a result I recommend continuing the same regimen unchanged    Now complaining for last 5 days of right knee swelling pain woke of 1 day without any obvious injury although patient been on his feet on the hard surface and walking at work    Course of Medrol Dosepak    X-ray knee right    To be seen by rheumatologist    Orders:  •  methylPREDNISolone 4 MG tablet therapy pack; Use as directed on package  •  XR knee 1 or 2 vw right; Future

## 2025-06-02 NOTE — PROGRESS NOTES
Name: Christopher Villela      : 1970      MRN: [id number]  Encounter Provider: Stuart Hartley MD  Encounter Date: 2025   Encounter department: [facility] INTERNAL MEDICINE  :  Assessment & Plan  Psoriatic arthritis (HCC)  Came in patient under care of rheumatologist for psoriatic arthritis  Enbrel 50 mg once weekly and meloxicam 15 mg daily as needed which he takes very sparingly.  He seems to be stable overall without any significant complaints and as a result I recommend continuing the same regimen unchanged    Now complaining for last 5 days of right knee swelling pain woke of 1 day without any obvious injury although patient been on his feet on the hard surface and walking at work    Course of Medrol Dosepak    X-ray knee right    To be seen by rheumatologist    Orders:  •  methylPREDNISolone 4 MG tablet therapy pack; Use as directed on package  •  XR knee 1 or 2 vw right; Future    Hypogonadism in male  Followed by urologist AndroGel replacement therapy follow-up with urologist              History of Present Illness   HPI  Review of Systems   Constitutional:  Positive for activity change. Negative for appetite change, chills, diaphoresis, fever and unexpected weight change.   HENT:  Negative for congestion, dental problem, drooling, ear discharge, ear pain, facial swelling, hearing loss, mouth sores, nosebleeds, postnasal drip, rhinorrhea, sinus pressure, sneezing, sore throat, tinnitus, trouble swallowing and voice change.    Eyes:  Negative for photophobia, pain, discharge, redness, itching and visual disturbance.   Respiratory:  Negative for apnea, cough, choking, chest tightness, shortness of breath, wheezing and stridor.    Cardiovascular:  Negative for chest pain, palpitations and leg swelling.   Gastrointestinal:  Negative for abdominal distention, anal bleeding, blood in stool, constipation, diarrhea, nausea, rectal pain and vomiting.   Endocrine: Negative for cold intolerance,  "heat intolerance, polydipsia, polyphagia and polyuria.   Genitourinary:  Negative for decreased urine volume, difficulty urinating, dysuria, enuresis, frequency, genital sores, hematuria and urgency.   Musculoskeletal:  Positive for arthralgias, gait problem and myalgias. Negative for back pain, neck pain and neck stiffness.   Skin:  Negative for color change, pallor, rash and wound.   Allergic/Immunologic: Negative.  Negative for environmental allergies, food allergies and immunocompromised state.   Neurological:  Negative for dizziness, tremors, seizures, syncope, facial asymmetry, speech difficulty, weakness, light-headedness, numbness and headaches.   Psychiatric/Behavioral:  Negative for agitation, behavioral problems, confusion, decreased concentration, dysphoric mood, hallucinations, self-injury, sleep disturbance and suicidal ideas. The patient is not nervous/anxious and is not hyperactive.        Objective   /80   Pulse 80   Ht 5' 9\" (1.753 m)   Wt 109 kg (241 lb)   SpO2 98%   BMI 35.59 kg/m²      Physical Exam  Vitals and nursing note reviewed.   Constitutional:       General: He is not in acute distress.     Appearance: He is well-developed. He is not ill-appearing, toxic-appearing or diaphoretic.   HENT:      Head: Normocephalic and atraumatic.      Right Ear: External ear normal.      Left Ear: External ear normal.      Nose: Nose normal.      Mouth/Throat:      Pharynx: No oropharyngeal exudate.     Eyes:      General: Lids are normal. Lids are everted, no foreign bodies appreciated. No scleral icterus.        Right eye: No discharge.         Left eye: No discharge.      Conjunctiva/sclera: Conjunctivae normal.      Pupils: Pupils are equal, round, and reactive to light.     Neck:      Thyroid: No thyromegaly.      Vascular: Normal carotid pulses. No carotid bruit, hepatojugular reflux or JVD.      Trachea: No tracheal tenderness or tracheal deviation.     Cardiovascular:      Rate and " Rhythm: Normal rate and regular rhythm.      Pulses: Normal pulses.      Heart sounds: Normal heart sounds. No murmur heard.     No friction rub. No gallop.   Pulmonary:      Effort: Pulmonary effort is normal. No respiratory distress.      Breath sounds: Normal breath sounds. No stridor. No wheezing or rales.   Chest:      Chest wall: No tenderness.   Abdominal:      General: Bowel sounds are normal. There is no distension.      Palpations: Abdomen is soft. There is no mass.      Tenderness: There is no abdominal tenderness. There is no guarding or rebound.     Musculoskeletal:         General: No tenderness or deformity. Normal range of motion.      Cervical back: Normal range of motion and neck supple. No edema, erythema or rigidity. No spinous process tenderness or muscular tenderness. Normal range of motion.   Lymphadenopathy:      Head:      Right side of head: No submental, submandibular, tonsillar, preauricular or posterior auricular adenopathy.      Left side of head: No submental, submandibular, tonsillar, preauricular, posterior auricular or occipital adenopathy.      Cervical: No cervical adenopathy.      Right cervical: No superficial, deep or posterior cervical adenopathy.     Left cervical: No superficial, deep or posterior cervical adenopathy.      Upper Body:      Right upper body: No pectoral adenopathy.      Left upper body: No pectoral adenopathy.     Skin:     General: Skin is warm and dry.      Coloration: Skin is not pale.      Findings: No erythema or rash.     Neurological:      General: No focal deficit present.      Mental Status: He is alert and oriented to person, place, and time.      Cranial Nerves: No cranial nerve deficit.      Sensory: No sensory deficit.      Motor: No tremor, abnormal muscle tone or seizure activity.      Coordination: Coordination normal.      Gait: Gait normal.      Deep Tendon Reflexes: Reflexes are normal and symmetric. Reflexes normal.     Psychiatric:          Behavior: Behavior normal.         Thought Content: Thought content normal.         Judgment: Judgment normal.

## 2025-07-07 ENCOUNTER — APPOINTMENT (OUTPATIENT)
Dept: RADIOLOGY | Facility: CLINIC | Age: 55
End: 2025-07-07
Payer: COMMERCIAL

## 2025-07-07 ENCOUNTER — OFFICE VISIT (OUTPATIENT)
Dept: OBGYN CLINIC | Facility: CLINIC | Age: 55
End: 2025-07-07
Payer: COMMERCIAL

## 2025-07-07 VITALS — BODY MASS INDEX: 35.7 KG/M2 | WEIGHT: 241 LBS | HEIGHT: 69 IN

## 2025-07-07 DIAGNOSIS — S46.119A RUPTURE OF MUSCLE OF LONG HEAD OF BICEPS: ICD-10-CM

## 2025-07-07 DIAGNOSIS — M24.812 INTERNAL DERANGEMENT OF LEFT SHOULDER: Primary | ICD-10-CM

## 2025-07-07 DIAGNOSIS — M24.812 INTERNAL DERANGEMENT OF LEFT SHOULDER: ICD-10-CM

## 2025-07-07 PROCEDURE — 99214 OFFICE O/P EST MOD 30 MIN: CPT | Performed by: ORTHOPAEDIC SURGERY

## 2025-07-07 PROCEDURE — 73030 X-RAY EXAM OF SHOULDER: CPT

## 2025-07-07 RX ORDER — ALPRAZOLAM 1 MG/1
1 TABLET ORAL ONCE
Qty: 1 TABLET | Refills: 0 | Status: SHIPPED | OUTPATIENT
Start: 2025-07-07 | End: 2025-07-07

## 2025-07-07 RX ORDER — ALPRAZOLAM 1 MG/1
1 TABLET ORAL ONCE
Qty: 1 TABLET | Refills: 0 | Status: CANCELLED | OUTPATIENT
Start: 2025-07-07 | End: 2025-07-07

## 2025-07-07 NOTE — LETTER
July 7, 2025     Patient: Christopher Villela  YOB: 1970  Date of Visit: 7/7/2025      To Whom it May Concern:    Christopher Villela is under my professional care. Christopher was seen in my office on 7/7/2025. Christopher should not lay any danielle for the next 6 weeks.     If you have any questions or concerns, please don't hesitate to call.         Sincerely,          Tian Horta MD        CC: No Recipients

## 2025-07-07 NOTE — PROGRESS NOTES
Patient Name: Christopher Villela      : 1970       MRN: [id number]   Encounter Provider: Tian Horta MD   Encounter Date: 25  Encounter department: [facility]         Assessment & Plan  Internal derangement of left shoulder  Rupture of muscle of long head of biceps  Patient sustained an acute injury while weightlifting.  He has a deformity in the biceps muscle belly consistent with a long head of the biceps rupture.  He also has pain and weakness with belly press and empty can.  I recommended an MRI to confirm the diagnosis of a ruptured long head of the biceps tendon as well as evaluate his rotator cuff for acute traumatic rotator cuff injury that may benefit from early surgical intervention.  We will see him back after MRI is completed to review images and discuss further treatment plan.    Orders:    XR shoulder 2+ vw left; Future    MRI shoulder left wo contrast; Future    ALPRAZolam (XANAX) 1 mg tablet; Take 1 tablet (1 mg total) by mouth 1 (one) time for 1 dose 20-30 minutes prior to scheduled MRI.       _____________________________________________________  CHIEF COMPLAINT:  Chief Complaint   Patient presents with    Left Arm - Pain         SUBJECTIVE:  Christopher Villela is a 54 y.o. male who presents several days after an acute injury to his left shoulder.  He was performing a fly when he felt acute pain and a pop in his left shoulder.  Shortly thereafter he noticed a muscular deformity in his left bicep.  Since then his pain is slightly improved but he still struggles with overhead or reaching activities.  He had normal shoulder function with no pain prior to the injury.  He is active at baseline and has been very successful with the recent significant weight loss and is eager to continue this goal.  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  He denies numbness or tingling.     Surgical History:  None    PAST  "MEDICAL HISTORY:  Past Medical History[1]    PAST SURGICAL HISTORY:  Past Surgical History[2]    FAMILY HISTORY:  Family History[3]    SOCIAL HISTORY:  Social History[4]    MEDICATIONS:  Current Medications[5]    ALLERGIES:  Allergies[6]    LABS:  HgA1c:   Lab Results   Component Value Date    HGBA1C 5.6 05/01/2025     BMP:   Lab Results   Component Value Date    CALCIUM 9.1 04/11/2023    K 4.5 05/01/2025    CO2 19 (L) 05/01/2025    CL 96 05/01/2025    BUN 11 05/01/2025    CREATININE 0.87 05/01/2025     CBC: No components found for: \"CBC\"    _____________________________________________________  Review of systems: ROS is negative other than that noted in the HPI.  Constitutional: Negative for fatigue and fever.   HENT: Negative for sore throat.    Respiratory: Negative for shortness of breath.    Cardiovascular: Negative for chest pain.   Gastrointestinal: Negative for abdominal pain.   Endocrine: Negative for cold intolerance and heat intolerance.   Genitourinary: Negative for flank pain.   Musculoskeletal: Negative for back pain.   Skin: Negative for rash.   Allergic/Immunologic: Negative for immunocompromised state.   Neurological: Negative for dizziness.   Psychiatric/Behavioral: Negative for agitation.         Physical exam:  General/Constitutional: NAD, well developed, well nourished  HENT: Normocephalic, atraumatic  CV: Intact distal pulses, regular rate  Resp: No respiratory distress or labored breathing  Abdomen: soft, nondistended   Lymphatic: No lymphadenopathy palpated  Neuro: Alert and Oriented x 3, no focal deficits  Psych: Normal mood, normal affect  Skin: Warm, dry, no rashes, no erythema    Shoulder Exam:     Inspection: Edwin deformity consistent with long head of the biceps rupture  Palpation: Tender palpation along the course of the bicipital groove  Active Motion: All limited by pain, does have full range of motion passively       FF: 140        ER: 45        IR: Sacrum  Strength: 4/5 empty " can, 5/5 ER,  4/5 IR   Sensory - SILT in the Radial / Ulnar / Median / Axillary nerve distributions  Motor - AIN / PIN / Radial / Ulnar / Median / Axillary motor nerves in tact  Palpable Radial pulse  Cap refill <2secs in all digits    + Belly Press    _____________________________________________________  STUDIES REVIEWED:  X-rays of the shoulder reviewed and interpreted today. These show no acute osseous abnormalities.  Minimal degenerative changes.  Humeral head is well centered on the glenoid.      PROCEDURES PERFORMED:  No procedures performed today.    Scribe Attestation      I,:   am acting as a scribe while in the presence of the attending physician.:       I,:   personally performed the services described in this documentation    as scribed in my presence.:                  [1]   Past Medical History:  Diagnosis Date    GERD (gastroesophageal reflux disease)     Hyperlipidemia     Hypertension     Psoriatic arthritis (HCC)    [2]   Past Surgical History:  Procedure Laterality Date    COLLATERAL LIGAMENT REPAIR, ELBOW Right 7/7/2023    Procedure: LATERAL COLLATERAL LIGAMENT REPAIR WITH INTERNAL BRACE AUGMENTATION;  Surgeon: Tony Barraza;  Location:  MAIN OR;  Service: Orthopedics    HIP SURGERY Bilateral     ME RPR 1ST INGUN HRNA AGE 5 YRS/> REDUCIBLE Right 12/11/2024    Procedure: REPAIR HERNIA INGUINAL;  Surgeon: Salbador Mccain MD;  Location: [facility];  Service: General   [3]   Family History  Problem Relation Name Age of Onset    No Known Problems Mother      No Known Problems Father      No Known Problems Sister      No Known Problems Brother      No Known Problems Daughter      No Known Problems Maternal Aunt      No Known Problems Maternal Uncle      No Known Problems Paternal Aunt      No Known Problems Paternal Uncle      No Known Problems Maternal Grandmother      No Known Problems Maternal Grandfather      No Known Problems Paternal Grandmother      No Known Problems Paternal Grandfather       No Known Problems Cousin      Alcohol abuse Neg Hx      Arthritis Neg Hx      Dementia Neg Hx      Cancer Neg Hx      COPD Neg Hx      Depression Neg Hx      Lupus Neg Hx      Drug abuse Neg Hx      Early death Neg Hx      Hearing loss Neg Hx      Hyperlipidemia Neg Hx      Learning disabilities Neg Hx      Substance Abuse Neg Hx      Stroke Neg Hx      Vision loss Neg Hx      Pulmonary embolism Neg Hx      Deep vein thrombosis Neg Hx      Mental illness Neg Hx      Crohn's disease Neg Hx      Rheum arthritis Neg Hx      Psoriasis Neg Hx      Asthma Neg Hx     [4]   Social History  Tobacco Use    Smoking status: Never     Passive exposure: Past    Smokeless tobacco: Never   Vaping Use    Vaping status: Never Used   Substance Use Topics    Alcohol use: Yes     Comment: weekly    Drug use: No   [5]   Current Outpatient Medications:     ALPRAZolam (XANAX) 1 mg tablet, Take 1 tablet (1 mg total) by mouth 1 (one) time for 1 dose 20-30 minutes prior to scheduled MRI., Disp: 1 tablet, Rfl: 0    esomeprazole (NexIUM) 40 MG capsule, Take 1 capsule (40 mg total) by mouth in the morning, Disp: 90 capsule, Rfl: 2    etanercept (Enbrel) 50 mg/mL SOSY, Inject 1 mL (50 mg) under the skin once a week, Disp: 4 mL, Rfl: 5    Evolocumab (Repatha SureClick) 140 MG/ML SOAJ, Inject 140 mg under the skin every 14 (fourteen) days, Disp: , Rfl:     folic acid (FOLVITE) 1 mg tablet, Take 1 tablet (1 mg total) by mouth daily, Disp: 30 tablet, Rfl: 16    lisinopril (ZESTRIL) 5 mg tablet, Take 5 mg by mouth in the morning., Disp: , Rfl:     methylPREDNISolone 4 MG tablet therapy pack, Use as directed on package, Disp: 21 each, Rfl: 0    Testosterone 12.5 MG/ACT (1%) GEL, , Disp: , Rfl:   [6] No Known Allergies

## 2025-07-09 ENCOUNTER — TELEPHONE (OUTPATIENT)
Dept: RHEUMATOLOGY | Facility: CLINIC | Age: 55
End: 2025-07-09

## 2025-07-09 NOTE — TELEPHONE ENCOUNTER
Patient called the rx line. He is not sure if he need a PA or Clinical review for the following....    Reason for call:   [x] Refill   [] Prior Auth  [] Other:     Office:   [] PCP/Provider -   [x] Specialty/Provider -     Medication: etanercept (Enbrel) 50 mg/mL SOSY     Dose/Frequency:  Inject 1 mL (50 mg) under the skin once a week     Quantity: 4 ml    Ph[facility]acy: [facility]   Does the patient have enough for 3 days?   [] Yes   [] No - Send as HP to POD    Mail Away Pharmacy   Does the patient have enough for 10 days?   [] Yes   [] No - Send as HP to POD

## 2025-07-10 NOTE — TELEPHONE ENCOUNTER
PA for Enbrel 50mg SUBMITTED to [employer]     via    []CMM-KEY:    []Surescripts-Case ID #    []Availity-Auth ID #  NDC #    [x]Faxed to plan   []Other website    []Phone call Case ID #      [x]PA sent as URGENT    All office notes, labs and other pertaining documents and studies sent. Clinical questions answered. Awaiting determination from insurance company.     Turnaround time for your insurance to make a decision on your Prior Authorization can take 7-21 business days.

## 2025-07-15 ENCOUNTER — HOSPITAL ENCOUNTER (OUTPATIENT)
Dept: RADIOLOGY | Facility: HOSPITAL | Age: 55
Discharge: HOME | End: 2025-07-15
Attending: ORTHOPAEDIC SURGERY
Payer: COMMERCIAL

## 2025-07-15 DIAGNOSIS — S46.119A RUPTURE OF MUSCLE OF LONG HEAD OF BICEPS: ICD-10-CM

## 2025-07-15 DIAGNOSIS — M24.812 INTERNAL DERANGEMENT OF LEFT SHOULDER: ICD-10-CM

## 2025-07-15 PROCEDURE — 73221 MRI JOINT UPR EXTREM W/O DYE: CPT

## 2025-08-20 ENCOUNTER — TELEPHONE (OUTPATIENT)
Age: 55
End: 2025-08-20

## 2025-08-21 ENCOUNTER — OFFICE VISIT (OUTPATIENT)
Dept: OBGYN CLINIC | Facility: CLINIC | Age: 55
End: 2025-08-21
Payer: COMMERCIAL

## 2025-08-21 VITALS — BODY MASS INDEX: 35.7 KG/M2 | HEIGHT: 69 IN | WEIGHT: 241 LBS

## 2025-08-21 DIAGNOSIS — M25.552 LEFT HIP PAIN: ICD-10-CM

## 2025-08-21 DIAGNOSIS — M61.50 POSTOPERATIVE HETEROTOPIC OSSIFICATION: Primary | ICD-10-CM

## 2025-08-21 DIAGNOSIS — M96.89 POSTOPERATIVE HETEROTOPIC OSSIFICATION: Primary | ICD-10-CM

## 2025-08-21 DIAGNOSIS — M76.892 HIP ABDUCTOR TENDONITIS, LEFT: ICD-10-CM

## 2025-08-21 DIAGNOSIS — Z96.643 HISTORY OF BILATERAL HIP ARTHROPLASTY: ICD-10-CM

## 2025-08-21 PROCEDURE — 99214 OFFICE O/P EST MOD 30 MIN: CPT | Performed by: ORTHOPAEDIC SURGERY

## (undated) DEVICE — STRL PENROSE DRAIN 18" X 1/4": Brand: CARDINAL HEALTH

## (undated) DEVICE — DRESSING MEPILEX AG BORDER POST-OP 4 X 8 IN

## (undated) DEVICE — PACK GENERAL LF

## (undated) DEVICE — MICRO HVTSA, 0.5G AND HVTSA SOURCEMARK PRODUCT CODE M1206 AND M1206-01: Brand: EXOFIN MICRO HVTSA, 0.5G

## (undated) DEVICE — ADHESIVE SKIN HIGH VISCOSITY EXOFIN 1ML

## (undated) DEVICE — DRAPE UTILITY

## (undated) DEVICE — ANTIBACTERIAL UNDYED BRAIDED (POLYGLACTIN 910), SYNTHETIC ABSORBABLE SUTURE: Brand: COATED VICRYL

## (undated) DEVICE — GLOVE INDICATOR PI UNDERGLOVE SZ 8 BLUE

## (undated) DEVICE — TOWEL SURG XR DETECT GREEN STRL RFD

## (undated) DEVICE — DRAPE SHEET THREE QUARTER

## (undated) DEVICE — DRAPE C-ARM X-RAY

## (undated) DEVICE — SUT VICRYL 0 CT-1 27 IN J340H

## (undated) DEVICE — GLOVE SRG BIOGEL 7.5

## (undated) DEVICE — 3M™ STERI-STRIP™ REINFORCED ADHESIVE SKIN CLOSURES, R1547, 1/2 IN X 4 IN (12 MM X 100 MM), 6 STRIPS/ENVELOPE: Brand: 3M™ STERI-STRIP™

## (undated) DEVICE — INTENDED FOR TISSUE SEPARATION, AND OTHER PROCEDURES THAT REQUIRE A SHARP SURGICAL BLADE TO PUNCTURE OR CUT.: Brand: BARD-PARKER SAFETY BLADES SIZE 15, STERILE

## (undated) DEVICE — C-WIRE PAK DOUBLE ENDED ORTHOPAEDIC WIRE, SPADE, .045" (1.14 MM)
Type: IMPLANTABLE DEVICE | Site: ELBOW | Status: NON-FUNCTIONAL
Brand: C-WIRE
Removed: 2023-07-07

## (undated) DEVICE — NEPTUNE E-SEP SMOKE EVACUATION PENCIL, COATED, 70MM BLADE, PUSH BUTTON SWITCH: Brand: NEPTUNE E-SEP

## (undated) DEVICE — BERKELEY 1/2" (13MM) COLLECTION SET, DISPOSABLE W/HANDLE AND TAPERED FITTING TUBING, 6 FT (183 CM): Brand: BERKELEY

## (undated) DEVICE — SUT MONOCRYL 3-0 PS-2 27 IN Y427H

## (undated) DEVICE — ARM SLING: Brand: DEROYAL

## (undated) DEVICE — IMPERVIOUS STOCKINETTE: Brand: DEROYAL

## (undated) DEVICE — GLOVE SRG BIOGEL 8

## (undated) DEVICE — GLOVE INDICATOR PI UNDERGLOVE SZ 7.5 BLUE

## (undated) DEVICE — SUT MONOCRYL 2-0 CT-1 27 IN Y339H

## (undated) DEVICE — TIBURON LAPAROTOMY DRAPE: Brand: CONVERTORS

## (undated) DEVICE — GROUNDING PAD UNIVERSAL SLW

## (undated) DEVICE — CUFF TOURNIQUET 18 X 5.5 IN QUICK CONNECT 2BLA

## (undated) DEVICE — COBAN 4 IN STERILE

## (undated) DEVICE — PADDING CAST 4 IN  COTTON STRL

## (undated) DEVICE — ACE WRAP 4 IN UNSTERILE

## (undated) DEVICE — SUT SILK 2-0 SH 30 IN K833H

## (undated) DEVICE — VIOLET BRAIDED (POLYGLACTIN 910), SYNTHETIC ABSORBABLE SUTURE: Brand: COATED VICRYL

## (undated) DEVICE — SUT PROLENE 2-0 RB-1/RB-1 36 IN 8559H

## (undated) DEVICE — SUT FIBERWIRE #2 1/2 CIRCLE T-5 38IN AR-7200

## (undated) DEVICE — U-DRAPE: Brand: CONVERTORS

## (undated) DEVICE — CHLORAPREP HI-LITE 26ML ORANGE

## (undated) DEVICE — 10FR FRAZIER SUCTION HANDLE: Brand: CARDINAL HEALTH

## (undated) DEVICE — DISPOSABLE EQUIPMENT COVER: Brand: SMALL TOWEL DRAPE

## (undated) DEVICE — 3M™ IOBAN™ 2 ANTIMICROBIAL INCISE DRAPE 6640EZ: Brand: IOBAN™ 2

## (undated) DEVICE — STERILE DOUBLE BASIN SET PACK: Brand: CARDINAL HEALTH

## (undated) DEVICE — INTENDED FOR TISSUE SEPARATION, AND OTHER PROCEDURES THAT REQUIRE A SHARP SURGICAL BLADE TO PUNCTURE OR CUT.: Brand: BARD-PARKER ® CARBON RIB-BACK BLADES

## (undated) DEVICE — ASTOUND STANDARD SURGICAL GOWN, XL: Brand: CONVERTORS

## (undated) DEVICE — STERILE BETHLEHEM PLASTIC HAND: Brand: CARDINAL HEALTH

## (undated) DEVICE — SUT MONOCRYL 4-0 PC-5 18 IN Y823G